# Patient Record
Sex: MALE | Race: WHITE | NOT HISPANIC OR LATINO | Employment: OTHER | URBAN - METROPOLITAN AREA
[De-identification: names, ages, dates, MRNs, and addresses within clinical notes are randomized per-mention and may not be internally consistent; named-entity substitution may affect disease eponyms.]

---

## 2023-02-16 PROBLEM — M40.12 OTHER SECONDARY KYPHOSIS, CERVICAL REGION: Status: ACTIVE | Noted: 2023-02-16

## 2023-02-16 PROBLEM — R34 OLIGURIA: Status: ACTIVE | Noted: 2023-02-16

## 2023-02-16 PROBLEM — H91.90 HARD OF HEARING: Status: ACTIVE | Noted: 2023-02-16

## 2023-02-16 PROBLEM — J44.9 CHRONIC OBSTRUCTIVE PULMONARY DISEASE (MULTI): Status: ACTIVE | Noted: 2023-02-16

## 2023-02-16 PROBLEM — E78.5 DYSLIPIDEMIA: Status: ACTIVE | Noted: 2023-02-16

## 2023-02-16 PROBLEM — E55.9 MILD VITAMIN D DEFICIENCY: Status: ACTIVE | Noted: 2023-02-16

## 2023-02-16 PROBLEM — I27.20 PULMONARY HYPERTENSION (MULTI): Status: ACTIVE | Noted: 2023-02-16

## 2023-02-16 PROBLEM — I07.1 TRICUSPID REGURGITATION: Status: ACTIVE | Noted: 2023-02-16

## 2023-02-16 PROBLEM — I10 HIGH BLOOD PRESSURE: Status: ACTIVE | Noted: 2023-02-16

## 2023-02-16 PROBLEM — R01.1 HEART MURMUR: Status: ACTIVE | Noted: 2023-02-16

## 2023-02-16 PROBLEM — D50.9 IRON DEFICIENCY ANEMIA: Status: ACTIVE | Noted: 2023-02-16

## 2023-02-16 PROBLEM — D64.9 ANEMIA: Status: ACTIVE | Noted: 2023-02-16

## 2023-02-16 PROBLEM — J31.0 NASAL INFLAMMATION: Status: ACTIVE | Noted: 2023-02-16

## 2023-02-16 PROBLEM — C61 PROSTATE CANCER (MULTI): Status: ACTIVE | Noted: 2023-02-16

## 2023-02-16 PROBLEM — N40.0 BPH (BENIGN PROSTATIC HYPERPLASIA): Status: ACTIVE | Noted: 2023-02-16

## 2023-02-16 PROBLEM — I71.21 ASCENDING AORTIC ANEURYSM (CMS-HCC): Status: ACTIVE | Noted: 2023-02-16

## 2023-02-16 PROBLEM — J98.4 RESTRICTIVE LUNG DISEASE: Status: ACTIVE | Noted: 2023-02-16

## 2023-02-16 PROBLEM — R06.09 DYSPNEA ON EXERTION: Status: ACTIVE | Noted: 2023-02-16

## 2023-02-16 PROBLEM — I35.0 AORTIC STENOSIS: Status: ACTIVE | Noted: 2023-02-16

## 2023-02-16 PROBLEM — M81.0 OSTEOPOROSIS: Status: ACTIVE | Noted: 2023-02-16

## 2023-02-16 PROBLEM — I49.9 IRREGULAR HEARTBEAT: Status: ACTIVE | Noted: 2023-02-16

## 2023-02-16 PROBLEM — N18.32 STAGE 3B CHRONIC KIDNEY DISEASE (MULTI): Status: ACTIVE | Noted: 2023-02-16

## 2023-02-16 PROBLEM — E53.8 VITAMIN B12 DEFICIENCY: Status: ACTIVE | Noted: 2023-02-16

## 2023-02-16 PROBLEM — E83.51 LOW CALCIUM LEVELS: Status: ACTIVE | Noted: 2023-02-16

## 2023-02-16 PROBLEM — I34.0 MITRAL REGURGITATION: Status: ACTIVE | Noted: 2023-02-16

## 2023-02-16 PROBLEM — K21.9 GERD (GASTROESOPHAGEAL REFLUX DISEASE): Status: ACTIVE | Noted: 2023-02-16

## 2023-02-16 RX ORDER — FAMOTIDINE 20 MG/1
20 TABLET, FILM COATED ORAL DAILY
COMMUNITY
Start: 2021-03-22 | End: 2023-11-20

## 2023-02-16 RX ORDER — IBUPROFEN 200 MG
1 CAPSULE ORAL DAILY
Status: ON HOLD | COMMUNITY
Start: 2021-10-12 | End: 2023-10-03 | Stop reason: ENTERED-IN-ERROR

## 2023-02-16 RX ORDER — SIMVASTATIN 40 MG/1
40 TABLET, FILM COATED ORAL NIGHTLY
COMMUNITY
Start: 2021-03-22 | End: 2024-02-12

## 2023-02-16 RX ORDER — ACETAMINOPHEN 500 MG
5000 TABLET ORAL DAILY
COMMUNITY
Start: 2021-10-12 | End: 2024-02-12

## 2023-02-16 RX ORDER — LOSARTAN POTASSIUM 50 MG/1
50 TABLET ORAL DAILY
COMMUNITY
Start: 2021-03-22 | End: 2024-01-05

## 2023-02-16 RX ORDER — BUDESONIDE AND FORMOTEROL FUMARATE DIHYDRATE 160; 4.5 UG/1; UG/1
2 AEROSOL RESPIRATORY (INHALATION) 2 TIMES DAILY
COMMUNITY
Start: 2021-03-22 | End: 2023-03-29 | Stop reason: CLARIF

## 2023-02-16 RX ORDER — ASPIRIN 81 MG/1
1 TABLET ORAL NIGHTLY
COMMUNITY
Start: 2021-03-22 | End: 2024-02-12

## 2023-02-16 RX ORDER — FLUTICASONE PROPIONATE 50 MCG
2 SPRAY, SUSPENSION (ML) NASAL DAILY
Status: ON HOLD | COMMUNITY
End: 2023-10-03 | Stop reason: ENTERED-IN-ERROR

## 2023-02-16 RX ORDER — TIOTROPIUM BROMIDE INHALATION SPRAY 3.12 UG/1
2 SPRAY, METERED RESPIRATORY (INHALATION) DAILY
COMMUNITY
Start: 2021-02-02 | End: 2023-11-20

## 2023-02-16 RX ORDER — TORSEMIDE 10 MG/1
10 TABLET ORAL DAILY
COMMUNITY
Start: 2021-03-22 | End: 2023-11-20

## 2023-02-16 RX ORDER — ALBUTEROL SULFATE 90 UG/1
2 AEROSOL, METERED RESPIRATORY (INHALATION) EVERY 4 HOURS PRN
COMMUNITY
Start: 2022-03-22 | End: 2024-01-15 | Stop reason: SDUPTHER

## 2023-02-16 RX ORDER — UBIDECARENONE 75 MG
500 CAPSULE ORAL DAILY
Status: ON HOLD | COMMUNITY
End: 2023-10-03 | Stop reason: ENTERED-IN-ERROR

## 2023-02-16 RX ORDER — AMLODIPINE BESYLATE 10 MG/1
10 TABLET ORAL DAILY
COMMUNITY
Start: 2021-03-22 | End: 2024-02-10 | Stop reason: HOSPADM

## 2023-02-16 RX ORDER — VIT A/VIT C/VIT E/ZINC/COPPER 2148-113
1 TABLET ORAL DAILY
Status: ON HOLD | COMMUNITY
Start: 2021-03-22 | End: 2023-10-03 | Stop reason: ENTERED-IN-ERROR

## 2023-02-16 RX ORDER — POTASSIUM CHLORIDE 750 MG/1
10 TABLET, FILM COATED, EXTENDED RELEASE ORAL DAILY
COMMUNITY
Start: 2022-01-03 | End: 2023-11-20

## 2023-02-16 RX ORDER — FERROUS SULFATE 325(65) MG
65 TABLET, DELAYED RELEASE (ENTERIC COATED) ORAL
COMMUNITY
Start: 2021-02-10 | End: 2023-10-06

## 2023-02-16 RX ORDER — TAMSULOSIN HYDROCHLORIDE 0.4 MG/1
0.4 CAPSULE ORAL DAILY
COMMUNITY
Start: 2021-03-22 | End: 2024-02-12

## 2023-03-23 ENCOUNTER — TELEPHONE (OUTPATIENT)
Dept: PRIMARY CARE | Facility: CLINIC | Age: 88
End: 2023-03-23
Payer: MEDICARE

## 2023-03-27 ASSESSMENT — ENCOUNTER SYMPTOMS
SORE THROAT: 0
CLAUDICATION: 0
SPUTUM PRODUCTION: 0
PND: 0
VOMITING: 0
SHORTNESS OF BREATH: 1
LEG PAIN: 0
HEMOPTYSIS: 0
SYNCOPE: 0
ORTHOPNEA: 0
SWOLLEN GLANDS: 0
HEADACHES: 0
RHINORRHEA: 0
FEVER: 0
ABDOMINAL PAIN: 0
WHEEZING: 0
NECK PAIN: 0

## 2023-03-29 ENCOUNTER — TELEPHONE (OUTPATIENT)
Dept: PRIMARY CARE | Facility: CLINIC | Age: 88
End: 2023-03-29

## 2023-03-29 ENCOUNTER — OFFICE VISIT (OUTPATIENT)
Dept: PRIMARY CARE | Facility: CLINIC | Age: 88
End: 2023-03-29
Payer: MEDICARE

## 2023-03-29 VITALS
RESPIRATION RATE: 18 BRPM | HEART RATE: 68 BPM | SYSTOLIC BLOOD PRESSURE: 130 MMHG | DIASTOLIC BLOOD PRESSURE: 74 MMHG | BODY MASS INDEX: 12.7 KG/M2 | OXYGEN SATURATION: 95 % | WEIGHT: 163 LBS

## 2023-03-29 DIAGNOSIS — I35.0 AORTIC VALVE STENOSIS, ETIOLOGY OF CARDIAC VALVE DISEASE UNSPECIFIED: ICD-10-CM

## 2023-03-29 DIAGNOSIS — C61 PROSTATE CANCER (MULTI): ICD-10-CM

## 2023-03-29 DIAGNOSIS — J44.9 CHRONIC OBSTRUCTIVE PULMONARY DISEASE, UNSPECIFIED COPD TYPE (MULTI): Primary | ICD-10-CM

## 2023-03-29 DIAGNOSIS — M81.0 OSTEOPOROSIS, UNSPECIFIED OSTEOPOROSIS TYPE, UNSPECIFIED PATHOLOGICAL FRACTURE PRESENCE: ICD-10-CM

## 2023-03-29 DIAGNOSIS — I71.21 ANEURYSM OF ASCENDING AORTA WITHOUT RUPTURE (CMS-HCC): ICD-10-CM

## 2023-03-29 PROCEDURE — 3078F DIAST BP <80 MM HG: CPT | Performed by: INTERNAL MEDICINE

## 2023-03-29 PROCEDURE — 3075F SYST BP GE 130 - 139MM HG: CPT | Performed by: INTERNAL MEDICINE

## 2023-03-29 PROCEDURE — 1159F MED LIST DOCD IN RCRD: CPT | Performed by: INTERNAL MEDICINE

## 2023-03-29 PROCEDURE — 99214 OFFICE O/P EST MOD 30 MIN: CPT | Performed by: INTERNAL MEDICINE

## 2023-03-29 RX ORDER — FLUTICASONE FUROATE AND VILANTEROL 200; 25 UG/1; UG/1
1 POWDER RESPIRATORY (INHALATION) DAILY
Qty: 90 EACH | Refills: 3 | Status: SHIPPED | OUTPATIENT
Start: 2023-03-29 | End: 2023-05-30

## 2023-03-29 ASSESSMENT — PATIENT HEALTH QUESTIONNAIRE - PHQ9
SUM OF ALL RESPONSES TO PHQ9 QUESTIONS 1 AND 2: 0
1. LITTLE INTEREST OR PLEASURE IN DOING THINGS: NOT AT ALL
1. LITTLE INTEREST OR PLEASURE IN DOING THINGS: NOT AT ALL
2. FEELING DOWN, DEPRESSED OR HOPELESS: NOT AT ALL
SUM OF ALL RESPONSES TO PHQ9 QUESTIONS 1 AND 2: 0
2. FEELING DOWN, DEPRESSED OR HOPELESS: NOT AT ALL

## 2023-03-29 ASSESSMENT — ENCOUNTER SYMPTOMS
PND: 0
SYNCOPE: 0
NECK PAIN: 0
SWOLLEN GLANDS: 0
VOMITING: 0
SPUTUM PRODUCTION: 0
WHEEZING: 0
ORTHOPNEA: 0
FEVER: 0
HEMOPTYSIS: 0
CLAUDICATION: 0
LEG PAIN: 0
SORE THROAT: 0
ABDOMINAL PAIN: 0
SHORTNESS OF BREATH: 1
RHINORRHEA: 0
HEADACHES: 0

## 2023-03-29 NOTE — TELEPHONE ENCOUNTER
[facility] advised Symbicort -4.5mcg is NOT covered.  Covered alternatives are Breo, Fluticasone/Salmeterol (Airduo), or Dulera.      Please send in a script for one of the alternatives

## 2023-03-29 NOTE — PROGRESS NOTES
Subjective   José Antonio Sandra is a 89 y.o. male who presents for FOLLOW UP  STILL USING O2 AT HOME 2L AT NIGHT WITH SLEEP OR PRN WITH ACTIVITIES   HAVING  L [facility]YE CATARACT SURGERY TOMORROW FOLLOWED BY R IN 2WKS    INSURANCE WILL NOT COVER SYMBICORT PT DOES HAVE PULMONOLOGIST WILL SEE IN MAY BUT YOU WRITE RX     Shortness of Breath  This is a chronic problem. The current episode started more than 1 year ago. Pertinent negatives include no abdominal pain, chest pain, claudication, coryza, ear pain, fever, headaches, hemoptysis, leg pain, leg swelling, neck pain, orthopnea, PND, rash, rhinorrhea, sore throat, sputum production, swollen glands, syncope, vomiting or wheezing. The symptoms are aggravated by exercise and any activity.    SAW PULMONOLOGIST LAST TIME AND SAID CONTINUE    BEEN WORKING FOR 4-5 YEARS.  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  BREATHING HAS BEEN STABLE.  CONTINUES TO RECIEVE PROLIA FOR HIS OSTEOPOROSIS AND LUPRON FOR PROSTATE CANCER      Review of Systems   Constitutional:  Negative for fever.   HENT:  Negative for ear pain, rhinorrhea and sore throat.    Respiratory:  Positive for shortness of breath. Negative for hemoptysis, sputum production and wheezing.    Cardiovascular:  Negative for chest pain, orthopnea, claudication, leg swelling, syncope and PND.   Gastrointestinal:  Negative for abdominal pain and vomiting.   Musculoskeletal:  Negative for neck pain.   Skin:  Negative for rash.   Neurological:  Negative for headaches.       Objective   /74   Pulse 68   Resp 18   Wt 73.9 kg (163 lb)   SpO2 95%   BMI 12.70 kg/m²     Physical Exam  Vitals reviewed.   Constitutional:       General: He is not in acute distress.     Appearance: Normal appearance. He is not ill-appearing.    Neck:      Comments: KYPHOSCOLIOTIC NECK DEFORMITY APPEARS ABOUT THE SAME AS PRIOR  Cardiovascular:      Rate and Rhythm: Normal rate and regular rhythm.      Pulses: Normal pulses.      Heart sounds: Murmur (3/6 RASHARD RUSB) heard.      No gallop.   Pulmonary:      Effort: No respiratory distress.      Breath sounds: Normal breath sounds. No wheezing, rhonchi or rales.   Abdominal:      General: Abdomen is flat. Bowel sounds are normal.      Palpations: Abdomen is soft.      Tenderness: There is no guarding or rebound.   Musculoskeletal:      Right lower leg: No edema.      Left lower leg: No edema.   Skin:     General: Skin is warm and dry.   Neurological:      General: No focal deficit present.      Mental Status: He is alert and oriented to person, place, and time. Mental status is at baseline.         Assessment/Plan   Problem List Items Addressed This Visit          Respiratory    Chronic obstructive pulmonary disease (CMS/HCC) - Primary    Relevant Medications    fluticasone furoate-vilanteroL (Breo Ellipta) 200-25 mcg/dose inhaler       Circulatory    Aortic stenosis    Relevant Orders    Referral to Cardiology    Transthoracic Echo (TTE) Complete    Follow Up In [facility] - PCP    Ascending aortic aneurysm       Genitourinary    Prostate cancer (CMS/HCC)       Musculoskeletal    Osteoporosis     Patient Instructions    BREO ELLIPTA INHALER IS ORDERED TO REPLACE THE SYMBICORT, AND SENT TO [facility]    2.  ECHOCARDIOGRAM IS ORDERED TO FOLLOW UP THE AORTIC STENOSIS SEVERITY    3.  REFER TO DR. OSEI TO GET ANOTHER OPINION AS TO WHETHER HE MIGHT BE A CANDIDATE FOR TAVR, SHOULD IT BE NEEDED    4.  PLEASE CALL IF REFILLS ARE NEEDED    5.  FOLLOW UP IN 6 MONTHS OR AS NEEDED

## 2023-04-25 LAB
ALANINE AMINOTRANSFERASE (SGPT) (U/L) IN SER/PLAS: 14 U/L (ref 10–52)
ALBUMIN (G/DL) IN SER/PLAS: 4.4 G/DL (ref 3.4–5)
ALKALINE PHOSPHATASE (U/L) IN SER/PLAS: 46 U/L (ref 33–136)
ANION GAP IN SER/PLAS: 15 MMOL/L (ref 10–20)
ASPARTATE AMINOTRANSFERASE (SGOT) (U/L) IN SER/PLAS: 14 U/L (ref 9–39)
BILIRUBIN TOTAL (MG/DL) IN SER/PLAS: 0.8 MG/DL (ref 0–1.2)
CALCIDIOL (25 OH VITAMIN D3) (NG/ML) IN SER/PLAS: 76 NG/ML
CALCIUM (MG/DL) IN SER/PLAS: 9.7 MG/DL (ref 8.6–10.3)
CARBON DIOXIDE, TOTAL (MMOL/L) IN SER/PLAS: 27 MMOL/L (ref 21–32)
CHLORIDE (MMOL/L) IN SER/PLAS: 103 MMOL/L (ref 98–107)
CREATININE (MG/DL) IN SER/PLAS: 1.64 MG/DL (ref 0.5–1.3)
GFR MALE: 39 ML/MIN/1.73M2
GLUCOSE (MG/DL) IN SER/PLAS: 87 MG/DL (ref 74–99)
PHOSPHATE (MG/DL) IN SER/PLAS: 3 MG/DL (ref 2.5–4.9)
POTASSIUM (MMOL/L) IN SER/PLAS: 3.8 MMOL/L (ref 3.5–5.3)
PROTEIN TOTAL: 7.2 G/DL (ref 6.4–8.2)
SODIUM (MMOL/L) IN SER/PLAS: 141 MMOL/L (ref 136–145)
UREA NITROGEN (MG/DL) IN SER/PLAS: 29 MG/DL (ref 6–23)

## 2023-05-30 DIAGNOSIS — J44.9 CHRONIC OBSTRUCTIVE PULMONARY DISEASE, UNSPECIFIED COPD TYPE (MULTI): ICD-10-CM

## 2023-05-30 RX ORDER — FLUTICASONE FUROATE AND VILANTEROL TRIFENATATE 200; 25 UG/1; UG/1
POWDER RESPIRATORY (INHALATION)
Qty: 180 EACH | Refills: 3 | Status: SHIPPED | OUTPATIENT
Start: 2023-05-30 | End: 2024-02-16

## 2023-08-19 PROBLEM — H25.13 NUCLEAR SENILE CATARACT OF BOTH EYES: Status: ACTIVE | Noted: 2023-03-17

## 2023-08-19 PROBLEM — I10 BENIGN ESSENTIAL HTN: Chronic | Status: ACTIVE | Noted: 2019-11-12

## 2023-08-19 PROBLEM — R35.1 NOCTURIA: Status: ACTIVE | Noted: 2020-02-13

## 2023-08-19 PROBLEM — D50.0 IRON DEFICIENCY ANEMIA DUE TO CHRONIC BLOOD LOSS: Chronic | Status: ACTIVE | Noted: 2020-03-05

## 2023-08-19 PROBLEM — I50.32 CHRONIC DIASTOLIC HEART FAILURE (MULTI): Chronic | Status: ACTIVE | Noted: 2019-07-29

## 2023-08-19 PROBLEM — E78.2 MIXED HYPERLIPIDEMIA: Chronic | Status: ACTIVE | Noted: 2019-11-12

## 2023-08-19 PROBLEM — J96.11 CHRONIC RESPIRATORY FAILURE WITH HYPOXIA (MULTI): Status: ACTIVE | Noted: 2019-12-04

## 2023-08-19 PROBLEM — M85.80 OSTEOPENIA: Status: ACTIVE | Noted: 2020-08-03

## 2023-08-19 PROBLEM — I34.0 MITRAL VALVE INSUFFICIENCY: Status: ACTIVE | Noted: 2023-03-17

## 2023-08-19 PROBLEM — Z85.46 HISTORY OF MALIGNANT NEOPLASM OF PROSTATE: Status: ACTIVE | Noted: 2022-06-05

## 2023-08-19 PROBLEM — K21.00 GASTROESOPHAGEAL REFLUX DISEASE WITH ESOPHAGITIS: Status: ACTIVE | Noted: 2020-06-02

## 2023-08-19 RX ORDER — POTASSIUM CHLORIDE 14.9 MG/ML
10 INJECTION INTRAVENOUS
Status: ON HOLD | COMMUNITY
End: 2023-10-03 | Stop reason: ENTERED-IN-ERROR

## 2023-08-19 RX ORDER — LEUPROLIDE ACETATE 45 MG
45 KIT INTRAMUSCULAR
Status: ON HOLD | COMMUNITY
End: 2023-10-03 | Stop reason: ENTERED-IN-ERROR

## 2023-08-19 RX ORDER — METOPROLOL SUCCINATE 25 MG/1
TABLET, EXTENDED RELEASE ORAL
Status: ON HOLD | COMMUNITY
Start: 2013-01-07 | End: 2023-10-03 | Stop reason: ENTERED-IN-ERROR

## 2023-08-19 RX ORDER — SODIUM CHLORIDE AND POTASSIUM CHLORIDE 150; 450 MG/100ML; MG/100ML
INJECTION, SOLUTION INTRAVENOUS
Status: ON HOLD | COMMUNITY
End: 2023-10-03 | Stop reason: ENTERED-IN-ERROR

## 2023-08-19 RX ORDER — LOSARTAN POTASSIUM 50 MG/1
1 TABLET ORAL 2 TIMES DAILY
Status: ON HOLD | COMMUNITY
Start: 2020-11-09 | End: 2023-10-03 | Stop reason: ENTERED-IN-ERROR

## 2023-08-19 RX ORDER — FLUTICASONE PROPIONATE AND SALMETEROL 100; 50 UG/1; UG/1
1 POWDER RESPIRATORY (INHALATION)
Status: ON HOLD | COMMUNITY
Start: 2013-01-07 | End: 2023-10-03 | Stop reason: ENTERED-IN-ERROR

## 2023-08-19 RX ORDER — POTASSIUM CHLORIDE 750 MG/1
1 TABLET, FILM COATED, EXTENDED RELEASE ORAL 2 TIMES DAILY
Status: ON HOLD | COMMUNITY
Start: 2021-03-01 | End: 2023-10-03 | Stop reason: ENTERED-IN-ERROR

## 2023-08-19 RX ORDER — ALENDRONATE SODIUM 70 MG/1
TABLET ORAL
Status: ON HOLD | COMMUNITY
Start: 2021-06-24 | End: 2023-10-03 | Stop reason: ENTERED-IN-ERROR

## 2023-08-19 RX ORDER — FUROSEMIDE 40 MG/1
TABLET ORAL DAILY
Status: ON HOLD | COMMUNITY
Start: 2021-03-01 | End: 2023-10-03 | Stop reason: ENTERED-IN-ERROR

## 2023-08-19 RX ORDER — CALCIUM CARB/VITAMIN D3/VIT K1 500-500-40
1 TABLET,CHEWABLE ORAL
Status: ON HOLD | COMMUNITY
End: 2023-10-03 | Stop reason: ENTERED-IN-ERROR

## 2023-08-19 RX ORDER — DENOSUMAB 60 MG/ML
60 INJECTION SUBCUTANEOUS
COMMUNITY
Start: 2023-03-22 | End: 2024-02-12 | Stop reason: ENTERED-IN-ERROR

## 2023-08-19 RX ORDER — BUDESONIDE AND FORMOTEROL FUMARATE DIHYDRATE 160; 4.5 UG/1; UG/1
2 AEROSOL RESPIRATORY (INHALATION) 2 TIMES DAILY
Status: ON HOLD | COMMUNITY
Start: 2021-03-22 | End: 2023-10-03 | Stop reason: ENTERED-IN-ERROR

## 2023-08-19 RX ORDER — ALBUTEROL SULFATE 90 UG/1
2 AEROSOL, METERED RESPIRATORY (INHALATION) EVERY 6 HOURS PRN
Status: ON HOLD | COMMUNITY
End: 2023-10-03 | Stop reason: ENTERED-IN-ERROR

## 2023-08-19 RX ORDER — HYDROCHLOROTHIAZIDE 12.5 MG/1
12.5 CAPSULE ORAL DAILY
Status: ON HOLD | COMMUNITY
Start: 2013-01-07 | End: 2023-10-03 | Stop reason: ENTERED-IN-ERROR

## 2023-08-19 RX ORDER — CIPROFLOXACIN HYDROCHLORIDE 3 MG/ML
1 SOLUTION/ DROPS OPHTHALMIC 4 TIMES DAILY
Status: ON HOLD | COMMUNITY
End: 2023-10-03 | Stop reason: ENTERED-IN-ERROR

## 2023-08-19 RX ORDER — CALCIUM CARBONATE 500(1250)
500 TABLET ORAL
COMMUNITY
End: 2024-02-12

## 2023-08-19 RX ORDER — ALBUTEROL SULFATE 0.83 MG/ML
2.5 SOLUTION RESPIRATORY (INHALATION) EVERY 6 HOURS PRN
Status: ON HOLD | COMMUNITY
Start: 2020-12-15 | End: 2023-10-03 | Stop reason: ENTERED-IN-ERROR

## 2023-08-19 RX ORDER — PREDNISOLONE ACETATE 10 MG/ML
1 SUSPENSION/ DROPS OPHTHALMIC 4 TIMES DAILY
Status: ON HOLD | COMMUNITY
End: 2023-10-03 | Stop reason: ENTERED-IN-ERROR

## 2023-10-02 ENCOUNTER — APPOINTMENT (OUTPATIENT)
Dept: RADIOLOGY | Facility: HOSPITAL | Age: 88
DRG: 280 | End: 2023-10-02
Payer: MEDICARE

## 2023-10-02 ENCOUNTER — HOSPITAL ENCOUNTER (INPATIENT)
Facility: HOSPITAL | Age: 88
LOS: 1 days | Discharge: HOME | DRG: 280 | End: 2023-10-05
Attending: EMERGENCY MEDICINE | Admitting: INTERNAL MEDICINE
Payer: MEDICARE

## 2023-10-02 DIAGNOSIS — I48.20 CHRONIC A-FIB (MULTI): Primary | ICD-10-CM

## 2023-10-02 DIAGNOSIS — I50.33 ACUTE ON CHRONIC DIASTOLIC HEART FAILURE (MULTI): ICD-10-CM

## 2023-10-02 DIAGNOSIS — I48.0 PAROXYSMAL ATRIAL FIBRILLATION (MULTI): ICD-10-CM

## 2023-10-02 DIAGNOSIS — I48.91 ATRIAL FIBRILLATION, UNSPECIFIED TYPE (MULTI): ICD-10-CM

## 2023-10-02 DIAGNOSIS — I50.32 CHRONIC DIASTOLIC HEART FAILURE (MULTI): Chronic | ICD-10-CM

## 2023-10-02 PROBLEM — R79.89 TROPONIN LEVEL ELEVATED: Status: ACTIVE | Noted: 2023-10-02

## 2023-10-02 LAB
ALBUMIN SERPL BCP-MCNC: 4 G/DL (ref 3.4–5)
ALP SERPL-CCNC: 40 U/L (ref 33–136)
ALT SERPL W P-5'-P-CCNC: 13 U/L (ref 10–52)
ANION GAP SERPL CALC-SCNC: 12 MMOL/L (ref 10–20)
AST SERPL W P-5'-P-CCNC: 13 U/L (ref 9–39)
BASOPHILS # BLD AUTO: 0.03 X10*3/UL (ref 0–0.1)
BASOPHILS NFR BLD AUTO: 0.4 %
BILIRUB SERPL-MCNC: 0.6 MG/DL (ref 0–1.2)
BNP SERPL-MCNC: 347 PG/ML (ref 0–99)
BUN SERPL-MCNC: 33 MG/DL (ref 6–23)
CALCIUM SERPL-MCNC: 9 MG/DL (ref 8.6–10.3)
CARDIAC TROPONIN I PNL SERPL HS: 21 NG/L (ref 0–20)
CARDIAC TROPONIN I PNL SERPL HS: 23 NG/L (ref 0–20)
CHLORIDE SERPL-SCNC: 106 MMOL/L (ref 98–107)
CO2 SERPL-SCNC: 27 MMOL/L (ref 21–32)
CREAT SERPL-MCNC: 1.56 MG/DL (ref 0.5–1.3)
EOSINOPHIL # BLD AUTO: 0.18 X10*3/UL (ref 0–0.4)
EOSINOPHIL NFR BLD AUTO: 2.2 %
ERYTHROCYTE [DISTWIDTH] IN BLOOD BY AUTOMATED COUNT: 13.6 % (ref 11.5–14.5)
GFR SERPL CREATININE-BSD FRML MDRD: 41 ML/MIN/1.73M*2
GLUCOSE SERPL-MCNC: 97 MG/DL (ref 74–99)
HCT VFR BLD AUTO: 32.3 % (ref 41–52)
HGB BLD-MCNC: 10 G/DL (ref 13.5–17.5)
IMM GRANULOCYTES # BLD AUTO: 0.02 X10*3/UL (ref 0–0.5)
IMM GRANULOCYTES NFR BLD AUTO: 0.2 % (ref 0–0.9)
LYMPHOCYTES # BLD AUTO: 1.06 X10*3/UL (ref 0.8–3)
LYMPHOCYTES NFR BLD AUTO: 13 %
MAGNESIUM SERPL-MCNC: 2.09 MG/DL (ref 1.6–2.4)
MCH RBC QN AUTO: 28.1 PG (ref 26–34)
MCHC RBC AUTO-ENTMCNC: 31 G/DL (ref 32–36)
MCV RBC AUTO: 91 FL (ref 80–100)
MONOCYTES # BLD AUTO: 0.56 X10*3/UL (ref 0.05–0.8)
MONOCYTES NFR BLD AUTO: 6.9 %
NEUTROPHILS # BLD AUTO: 6.28 X10*3/UL (ref 1.6–5.5)
NEUTROPHILS NFR BLD AUTO: 77.3 %
NRBC BLD-RTO: 0 /100 WBCS (ref 0–0)
PLATELET # BLD AUTO: 116 X10*3/UL (ref 150–450)
PMV BLD AUTO: 11.5 FL (ref 7.5–11.5)
POTASSIUM SERPL-SCNC: 4.3 MMOL/L (ref 3.5–5.3)
PROT SERPL-MCNC: 6.5 G/DL (ref 6.4–8.2)
RBC # BLD AUTO: 3.56 X10*6/UL (ref 4.5–5.9)
SODIUM SERPL-SCNC: 141 MMOL/L (ref 136–145)
WBC # BLD AUTO: 8.1 X10*3/UL (ref 4.4–11.3)

## 2023-10-02 PROCEDURE — 84484 ASSAY OF TROPONIN QUANT: CPT | Performed by: EMERGENCY MEDICINE

## 2023-10-02 PROCEDURE — 93010 ELECTROCARDIOGRAM REPORT: CPT | Performed by: EMERGENCY MEDICINE

## 2023-10-02 PROCEDURE — 83880 ASSAY OF NATRIURETIC PEPTIDE: CPT | Performed by: EMERGENCY MEDICINE

## 2023-10-02 PROCEDURE — 96374 THER/PROPH/DIAG INJ IV PUSH: CPT

## 2023-10-02 PROCEDURE — 99223 1ST HOSP IP/OBS HIGH 75: CPT | Performed by: INTERNAL MEDICINE

## 2023-10-02 PROCEDURE — 85025 COMPLETE CBC W/AUTO DIFF WBC: CPT | Performed by: EMERGENCY MEDICINE

## 2023-10-02 PROCEDURE — 2500000004 HC RX 250 GENERAL PHARMACY W/ HCPCS (ALT 636 FOR OP/ED)

## 2023-10-02 PROCEDURE — 83735 ASSAY OF MAGNESIUM: CPT | Performed by: EMERGENCY MEDICINE

## 2023-10-02 PROCEDURE — 36415 COLL VENOUS BLD VENIPUNCTURE: CPT | Performed by: EMERGENCY MEDICINE

## 2023-10-02 PROCEDURE — 71045 X-RAY EXAM CHEST 1 VIEW: CPT | Performed by: RADIOLOGY

## 2023-10-02 PROCEDURE — 99285 EMERGENCY DEPT VISIT HI MDM: CPT | Performed by: EMERGENCY MEDICINE

## 2023-10-02 PROCEDURE — 36415 COLL VENOUS BLD VENIPUNCTURE: CPT

## 2023-10-02 PROCEDURE — 2580000001 HC RX 258 IV SOLUTIONS

## 2023-10-02 PROCEDURE — 71045 X-RAY EXAM CHEST 1 VIEW: CPT | Mod: FY

## 2023-10-02 PROCEDURE — 96361 HYDRATE IV INFUSION ADD-ON: CPT

## 2023-10-02 PROCEDURE — 80053 COMPREHEN METABOLIC PANEL: CPT | Performed by: EMERGENCY MEDICINE

## 2023-10-02 RX ORDER — FUROSEMIDE 10 MG/ML
40 INJECTION INTRAMUSCULAR; INTRAVENOUS ONCE
Status: COMPLETED | OUTPATIENT
Start: 2023-10-02 | End: 2023-10-02

## 2023-10-02 RX ADMIN — FUROSEMIDE 40 MG: 10 INJECTION, SOLUTION INTRAMUSCULAR; INTRAVENOUS at 22:03

## 2023-10-02 RX ADMIN — SODIUM CHLORIDE, SODIUM LACTATE, POTASSIUM CHLORIDE, AND CALCIUM CHLORIDE 500 ML: 600; 310; 30; 20 INJECTION, SOLUTION INTRAVENOUS at 19:50

## 2023-10-02 ASSESSMENT — PAIN SCALES - GENERAL
PAINLEVEL_OUTOF10: 0 - NO PAIN
PAINLEVEL_OUTOF10: 0 - NO PAIN

## 2023-10-02 ASSESSMENT — LIFESTYLE VARIABLES
HAVE PEOPLE ANNOYED YOU BY CRITICIZING YOUR DRINKING: NO
EVER HAD A DRINK FIRST THING IN THE MORNING TO STEADY YOUR NERVES TO GET RID OF A HANGOVER: NO
HAVE YOU EVER FELT YOU SHOULD CUT DOWN ON YOUR DRINKING: NO
EVER FELT BAD OR GUILTY ABOUT YOUR DRINKING: NO

## 2023-10-02 ASSESSMENT — COLUMBIA-SUICIDE SEVERITY RATING SCALE - C-SSRS
1. IN THE PAST MONTH, HAVE YOU WISHED YOU WERE DEAD OR WISHED YOU COULD GO TO SLEEP AND NOT WAKE UP?: NO
6. HAVE YOU EVER DONE ANYTHING, STARTED TO DO ANYTHING, OR PREPARED TO DO ANYTHING TO END YOUR LIFE?: NO
2. HAVE YOU ACTUALLY HAD ANY THOUGHTS OF KILLING YOURSELF?: NO

## 2023-10-02 ASSESSMENT — PAIN - FUNCTIONAL ASSESSMENT: PAIN_FUNCTIONAL_ASSESSMENT: 0-10

## 2023-10-02 NOTE — ED PROVIDER NOTES
HPI   Chief Complaint   Patient presents with    Irregular Heart Beat     Patient spoke with his cardiologist today. Cardiologist is Dr. Mauro        HPI     89-year-old male presents emergency room with chief complaint of irregular heartbeat and dizziness.  Patient indicates that his eye phone watch Signaling him that he was in A-fib he called his cardiologist who is Dr. Nj Mauro told him to come into the emergency room.  Patient denies any chest pain fever, nausea, vomiting is otherwise feeling okay.    PMHx: Hyperlipidemia, hypertension, aortic stenosis, COPD  PSHx: Appendectomy, prostate procedure  FHx: Mother had a myocardial infarction.  SocHX:     EtOH: Social alcohol use.       Tobacco:  Patient denies cigarette use     Other illicits: none  Allergies: Penicillin patient indicates that he has diffuse swelling throughout.      ROS  Constitutional:      Denies: Fever, fatigue     Reports: None  Neuro:      Denies: Headache, numbness, tingling     Reports: None  Psych:      Denies: anorexia, SI, HI     Reports: None  HEENT:      Denies: vision change, congestion, sore throat     Reports: None  Cardiovascular:      Denies: Chest pain, palpitations, orthopnea     Reports: None  Pulmonary:      Denies:  cough, hemoptysis     Reports: Patient endorses he has more shortness of breath with movement than normal.  Gastrointestinal:      Denies: Abdominal pain, nausea, vomiting, constipation, diarrhea, bright red stools, black stools     Reports: None  Genitourinary:      Denies: Flank pain, painful urination, frequent urination, discharge     Reports: None  Musculoskeletal:     Denies: loss of ROM, extremity pain, back pain     Reports: None  Integumentary:     Denies: Rash, itching     Reports: None    PHYSICAL EXAM  Constitutional: Well appearing, no acute distress, oriented to person/place/time  Psych: Age appropriate insight, judgement, no psychomotor agitation  Neuro: GCS 15, spontaneously moves all  extremities  Head: Atraumatic, no nguyen sign, no raccoon eyes  Eyes: Spontaneously open, PERRL, EOMI, no scleral icterus or conjunctival injection  ENT: No gross deformation, mucous membranes moist, trachea midline, no uvular deviation  Neck: Supple, nontender, no ROM restriction, patient has an appreciable JVD that is exacerbated with pressure on the liver hepatojugular reflex.  Respiratory:  respirations nonlabored, equal chest rise, no wheezes rales or rhonchi.  Crackles were appreciated on the right side.  Cardiovascular: Patient is currently in A-fib.  Distal pulses 2+ symmetric, appreciable aortic stenosis murmur was auscultated.  Gastrointestinal: No gross deformation, normal bowel sounds, soft, nontender, nondistended  Musculoskeletal: Normal Appearance, Full ROM of extremities, no tenderness, no edema  Integumentary: Warm, dry, no rashes    MDM/ED Course  89-year-old male presents emergency room with chief complaint of irregular heartbeat and dizziness.  Medical management in the emergency room will consist of orthostatic blood pressure readings 40 mg of Lasix as the patient appears to be fluid overloaded.  Cardiac work-up which includes CBC, CMP, troponin, BNP, chest x-ray.  Patient will most likely be admitted.  Patient's primary care provider John Russell. Pt was mildly hypoxic on room air and leveld out on 2 l      Given patient's new onset atrial fibrillation and heart failure decision was made to admit the patient to the hospital for further evaluation and management.  Patient is currently rate controlled, no indication for cardioversion at this time.  Patient had been sent in by his cardiologist, Dr. Mauro who was updated on the patient's status.    Chronic conditions: See chart review    External records review: inpatient notes, outpatient records  History obtained from: patient/chart review        Study interpretations:  My independent interpretation of EKG: Patient's A-fib indicates he is in  atrial fibrillation has a ventricular rate of 82 bpm with an undecipherable MD interval QRS duration is 88 ms QT/QTc is 406/474, P-R-T axis -10-48 appears to be a left axis deviation.        I reviewed the case with the attending ED physician. The attending ED physician agrees with the plan. Patient and/or patient´s representative was counseled regarding labs, imaging, likely diagnosis, and plan. All questions were answered.  Deep Bagley MD  PGY-1  Emergency Medicine      Please excuse any misspellings or unintended errors related to the Dragon speech recognition software used to dictate this note.                [place] Coma Scale Score: 15                  Patient History   Past Medical History:   Diagnosis Date    Personal history of malignant neoplasm of prostate 03/22/2021    History of malignant neoplasm of prostate     Past Surgical History:   Procedure Laterality Date    OTHER SURGICAL HISTORY  03/22/2021    Appendectomy     Family History   Problem Relation Name Age of Onset    Hypertension Mother      Heart attack Mother       Social History     Tobacco Use    Smoking status: Never    Smokeless tobacco: Never   Substance Use Topics    Alcohol use: Not on file    Drug use: Not on file       Physical Exam   ED Triage Vitals [10/02/23 1917]   Temp Heart Rate Resp BP   36.6 °C (97.9 °F) 83 18 --      SpO2 Temp Source Heart Rate Source Patient Position   94 % Temporal Monitor Sitting      BP Location FiO2 (%)     Right arm --       Physical Exam    ED Course & MDM   Diagnoses as of 10/02/23 2152   Chronic a-fib (CMS/[facility])       Medical Decision Making      Procedure  Procedures     Deep Bagley MD  Resident  10/02/23 6001    The patient was seen by the resident/fellow.  I have personally performed a substantive portion of the encounter.  I have seen and examined the patient; agree with the workup, evaluation, MDM, management and diagnosis.  The care plan has been discussed with the resident; I have reviewed  the resident’s note and agree with the documented findings.       Mehdi Alexandra, DO  10/07/23 8451

## 2023-10-03 PROBLEM — I50.33 ACUTE ON CHRONIC DIASTOLIC HEART FAILURE (MULTI): Status: ACTIVE | Noted: 2023-10-03

## 2023-10-03 LAB
ALBUMIN SERPL BCP-MCNC: 3.6 G/DL (ref 3.4–5)
ALP SERPL-CCNC: 37 U/L (ref 33–136)
ALT SERPL W P-5'-P-CCNC: 10 U/L (ref 10–52)
ANION GAP SERPL CALC-SCNC: 13 MMOL/L (ref 10–20)
AST SERPL W P-5'-P-CCNC: 15 U/L (ref 9–39)
BILIRUB SERPL-MCNC: 0.7 MG/DL (ref 0–1.2)
BUN SERPL-MCNC: 27 MG/DL (ref 6–23)
CALCIUM SERPL-MCNC: 9 MG/DL (ref 8.6–10.3)
CHLORIDE SERPL-SCNC: 105 MMOL/L (ref 98–107)
CO2 SERPL-SCNC: 25 MMOL/L (ref 21–32)
CREAT SERPL-MCNC: 1.56 MG/DL (ref 0.5–1.3)
ERYTHROCYTE [DISTWIDTH] IN BLOOD BY AUTOMATED COUNT: 13.5 % (ref 11.5–14.5)
GFR SERPL CREATININE-BSD FRML MDRD: 41 ML/MIN/1.73M*2
GLUCOSE SERPL-MCNC: 162 MG/DL (ref 74–99)
HCT VFR BLD AUTO: 33.7 % (ref 41–52)
HGB BLD-MCNC: 10.3 G/DL (ref 13.5–17.5)
MAGNESIUM SERPL-MCNC: 2.1 MG/DL (ref 1.6–2.4)
MCH RBC QN AUTO: 28.9 PG (ref 26–34)
MCHC RBC AUTO-ENTMCNC: 30.6 G/DL (ref 32–36)
MCV RBC AUTO: 94 FL (ref 80–100)
NRBC BLD-RTO: 0 /100 WBCS (ref 0–0)
PLATELET # BLD AUTO: 122 X10*3/UL (ref 150–450)
PMV BLD AUTO: 12.3 FL (ref 7.5–11.5)
POTASSIUM SERPL-SCNC: 4 MMOL/L (ref 3.5–5.3)
PROT SERPL-MCNC: 5.9 G/DL (ref 6.4–8.2)
RBC # BLD AUTO: 3.57 X10*6/UL (ref 4.5–5.9)
SODIUM SERPL-SCNC: 139 MMOL/L (ref 136–145)
WBC # BLD AUTO: 8.2 X10*3/UL (ref 4.4–11.3)

## 2023-10-03 PROCEDURE — 36415 COLL VENOUS BLD VENIPUNCTURE: CPT | Performed by: INTERNAL MEDICINE

## 2023-10-03 PROCEDURE — 2500000002 HC RX 250 W HCPCS SELF ADMINISTERED DRUGS (ALT 637 FOR MEDICARE OP, ALT 636 FOR OP/ED): Performed by: STUDENT IN AN ORGANIZED HEALTH CARE EDUCATION/TRAINING PROGRAM

## 2023-10-03 PROCEDURE — 80053 COMPREHEN METABOLIC PANEL: CPT | Performed by: INTERNAL MEDICINE

## 2023-10-03 PROCEDURE — 2500000004 HC RX 250 GENERAL PHARMACY W/ HCPCS (ALT 636 FOR OP/ED): Performed by: STUDENT IN AN ORGANIZED HEALTH CARE EDUCATION/TRAINING PROGRAM

## 2023-10-03 PROCEDURE — 99232 SBSQ HOSP IP/OBS MODERATE 35: CPT | Performed by: NURSE PRACTITIONER

## 2023-10-03 PROCEDURE — 97165 OT EVAL LOW COMPLEX 30 MIN: CPT | Mod: GO | Performed by: OCCUPATIONAL THERAPIST

## 2023-10-03 PROCEDURE — 2500000005 HC RX 250 GENERAL PHARMACY W/O HCPCS: Performed by: STUDENT IN AN ORGANIZED HEALTH CARE EDUCATION/TRAINING PROGRAM

## 2023-10-03 PROCEDURE — 83735 ASSAY OF MAGNESIUM: CPT | Performed by: INTERNAL MEDICINE

## 2023-10-03 PROCEDURE — 94640 AIRWAY INHALATION TREATMENT: CPT

## 2023-10-03 PROCEDURE — 85027 COMPLETE CBC AUTOMATED: CPT | Performed by: INTERNAL MEDICINE

## 2023-10-03 PROCEDURE — G0378 HOSPITAL OBSERVATION PER HR: HCPCS

## 2023-10-03 PROCEDURE — 99254 IP/OBS CNSLTJ NEW/EST MOD 60: CPT | Performed by: INTERNAL MEDICINE

## 2023-10-03 PROCEDURE — 99232 SBSQ HOSP IP/OBS MODERATE 35: CPT | Performed by: STUDENT IN AN ORGANIZED HEALTH CARE EDUCATION/TRAINING PROGRAM

## 2023-10-03 PROCEDURE — 2500000001 HC RX 250 WO HCPCS SELF ADMINISTERED DRUGS (ALT 637 FOR MEDICARE OP): Performed by: STUDENT IN AN ORGANIZED HEALTH CARE EDUCATION/TRAINING PROGRAM

## 2023-10-03 PROCEDURE — 97161 PT EVAL LOW COMPLEX 20 MIN: CPT | Mod: GP

## 2023-10-03 RX ORDER — BUDESONIDE 0.5 MG/2ML
0.5 INHALANT ORAL
Status: DISCONTINUED | OUTPATIENT
Start: 2023-10-03 | End: 2023-10-05 | Stop reason: HOSPADM

## 2023-10-03 RX ORDER — ACETAMINOPHEN 160 MG/5ML
650 SOLUTION ORAL EVERY 4 HOURS PRN
Status: DISCONTINUED | OUTPATIENT
Start: 2023-10-03 | End: 2023-10-05 | Stop reason: HOSPADM

## 2023-10-03 RX ORDER — POLYETHYLENE GLYCOL 3350 17 G/17G
17 POWDER, FOR SOLUTION ORAL DAILY
Status: DISCONTINUED | OUTPATIENT
Start: 2023-10-03 | End: 2023-10-05 | Stop reason: HOSPADM

## 2023-10-03 RX ORDER — ACETAMINOPHEN 325 MG/1
650 TABLET ORAL EVERY 4 HOURS PRN
Status: DISCONTINUED | OUTPATIENT
Start: 2023-10-03 | End: 2023-10-05 | Stop reason: HOSPADM

## 2023-10-03 RX ORDER — ACETAMINOPHEN 650 MG/1
650 SUPPOSITORY RECTAL EVERY 4 HOURS PRN
Status: DISCONTINUED | OUTPATIENT
Start: 2023-10-03 | End: 2023-10-05 | Stop reason: HOSPADM

## 2023-10-03 RX ORDER — AMLODIPINE BESYLATE 10 MG/1
10 TABLET ORAL DAILY
Status: DISCONTINUED | OUTPATIENT
Start: 2023-10-03 | End: 2023-10-05 | Stop reason: HOSPADM

## 2023-10-03 RX ORDER — ACETAMINOPHEN 500 MG
5000 TABLET ORAL DAILY
Status: DISCONTINUED | OUTPATIENT
Start: 2023-10-03 | End: 2023-10-05 | Stop reason: HOSPADM

## 2023-10-03 RX ORDER — ONDANSETRON HYDROCHLORIDE 2 MG/ML
4 INJECTION, SOLUTION INTRAVENOUS EVERY 8 HOURS PRN
Status: DISCONTINUED | OUTPATIENT
Start: 2023-10-03 | End: 2023-10-05 | Stop reason: HOSPADM

## 2023-10-03 RX ORDER — ALBUTEROL SULFATE 90 UG/1
2 AEROSOL, METERED RESPIRATORY (INHALATION) EVERY 4 HOURS PRN
Status: DISCONTINUED | OUTPATIENT
Start: 2023-10-03 | End: 2023-10-03

## 2023-10-03 RX ORDER — TORSEMIDE 20 MG/1
10 TABLET ORAL DAILY
Status: DISCONTINUED | OUTPATIENT
Start: 2023-10-03 | End: 2023-10-05 | Stop reason: HOSPADM

## 2023-10-03 RX ORDER — FORMOTEROL FUMARATE DIHYDRATE 20 UG/2ML
20 SOLUTION RESPIRATORY (INHALATION)
Status: DISCONTINUED | OUTPATIENT
Start: 2023-10-03 | End: 2023-10-05 | Stop reason: HOSPADM

## 2023-10-03 RX ORDER — ASPIRIN 81 MG/1
81 TABLET ORAL DAILY
Status: DISCONTINUED | OUTPATIENT
Start: 2023-10-03 | End: 2023-10-05 | Stop reason: HOSPADM

## 2023-10-03 RX ORDER — ALBUTEROL SULFATE 0.83 MG/ML
2.5 SOLUTION RESPIRATORY (INHALATION) EVERY 4 HOURS PRN
Status: DISCONTINUED | OUTPATIENT
Start: 2023-10-03 | End: 2023-10-05 | Stop reason: HOSPADM

## 2023-10-03 RX ORDER — FAMOTIDINE 20 MG/1
20 TABLET, FILM COATED ORAL DAILY
Status: DISCONTINUED | OUTPATIENT
Start: 2023-10-03 | End: 2023-10-05 | Stop reason: HOSPADM

## 2023-10-03 RX ORDER — SIMVASTATIN 40 MG/1
40 TABLET, FILM COATED ORAL NIGHTLY
Status: DISCONTINUED | OUTPATIENT
Start: 2023-10-03 | End: 2023-10-05 | Stop reason: HOSPADM

## 2023-10-03 RX ORDER — ONDANSETRON 4 MG/1
4 TABLET, ORALLY DISINTEGRATING ORAL EVERY 8 HOURS PRN
Status: DISCONTINUED | OUTPATIENT
Start: 2023-10-03 | End: 2023-10-05 | Stop reason: HOSPADM

## 2023-10-03 RX ORDER — LOSARTAN POTASSIUM 50 MG/1
50 TABLET ORAL DAILY
Status: DISCONTINUED | OUTPATIENT
Start: 2023-10-03 | End: 2023-10-05 | Stop reason: HOSPADM

## 2023-10-03 RX ORDER — TAMSULOSIN HYDROCHLORIDE 0.4 MG/1
0.4 CAPSULE ORAL DAILY
Status: DISCONTINUED | OUTPATIENT
Start: 2023-10-03 | End: 2023-10-05 | Stop reason: HOSPADM

## 2023-10-03 RX ORDER — FLUTICASONE FUROATE AND VILANTEROL 200; 25 UG/1; UG/1
1 POWDER RESPIRATORY (INHALATION) DAILY
Status: DISCONTINUED | OUTPATIENT
Start: 2023-10-03 | End: 2023-10-03

## 2023-10-03 RX ADMIN — FORMOTEROL FUMARATE 20 MCG: 20 SOLUTION RESPIRATORY (INHALATION) at 21:44

## 2023-10-03 RX ADMIN — CHOLECALCIFEROL TAB 125 MCG (5000 UNIT) 5000 UNITS: 125 TAB at 21:21

## 2023-10-03 RX ADMIN — BUDESONIDE 0.5 MG: 0.5 INHALANT RESPIRATORY (INHALATION) at 21:44

## 2023-10-03 RX ADMIN — AMLODIPINE BESYLATE 10 MG: 10 TABLET ORAL at 21:28

## 2023-10-03 RX ADMIN — TORSEMIDE 10 MG: 20 TABLET ORAL at 21:22

## 2023-10-03 RX ADMIN — FAMOTIDINE 20 MG: 20 TABLET ORAL at 21:22

## 2023-10-03 RX ADMIN — SIMVASTATIN 40 MG: 40 TABLET, FILM COATED ORAL at 21:22

## 2023-10-03 RX ADMIN — ASPIRIN 81 MG: 81 TABLET, COATED ORAL at 21:27

## 2023-10-03 RX ADMIN — TAMSULOSIN HYDROCHLORIDE 0.4 MG: 0.4 CAPSULE ORAL at 21:28

## 2023-10-03 RX ADMIN — Medication 3 L/MIN: at 21:48

## 2023-10-03 RX ADMIN — LOSARTAN POTASSIUM 50 MG: 50 TABLET, FILM COATED ORAL at 21:28

## 2023-10-03 SDOH — SOCIAL STABILITY: SOCIAL INSECURITY: DO YOU FEEL UNSAFE GOING BACK TO THE PLACE WHERE YOU ARE LIVING?: NO

## 2023-10-03 SDOH — SOCIAL STABILITY: SOCIAL INSECURITY: DO YOU FEEL ANYONE HAS EXPLOITED OR TAKEN ADVANTAGE OF YOU FINANCIALLY OR OF YOUR PERSONAL PROPERTY?: NO

## 2023-10-03 SDOH — SOCIAL STABILITY: SOCIAL INSECURITY: DOES ANYONE TRY TO KEEP YOU FROM HAVING/CONTACTING OTHER FRIENDS OR DOING THINGS OUTSIDE YOUR HOME?: NO

## 2023-10-03 SDOH — SOCIAL STABILITY: SOCIAL INSECURITY: ARE YOU OR HAVE YOU BEEN THREATENED OR ABUSED PHYSICALLY, EMOTIONALLY, OR SEXUALLY BY ANYONE?: NO

## 2023-10-03 SDOH — SOCIAL STABILITY: SOCIAL INSECURITY: ABUSE: ADULT

## 2023-10-03 SDOH — SOCIAL STABILITY: SOCIAL INSECURITY: ARE THERE ANY APPARENT SIGNS OF INJURIES/BEHAVIORS THAT COULD BE RELATED TO ABUSE/NEGLECT?: NO

## 2023-10-03 SDOH — SOCIAL STABILITY: SOCIAL INSECURITY: HAS ANYONE EVER THREATENED TO HURT YOUR FAMILY OR YOUR PETS?: NO

## 2023-10-03 SDOH — SOCIAL STABILITY: SOCIAL INSECURITY: HAVE YOU HAD THOUGHTS OF HARMING ANYONE ELSE?: NO

## 2023-10-03 ASSESSMENT — LIFESTYLE VARIABLES
AUDIT-C TOTAL SCORE: 0
SKIP TO QUESTIONS 9-10: 1
SUBSTANCE_ABUSE_PAST_12_MONTHS: NO
AUDIT-C TOTAL SCORE: 0
HOW MANY STANDARD DRINKS CONTAINING ALCOHOL DO YOU HAVE ON A TYPICAL DAY: PATIENT DOES NOT DRINK
HOW OFTEN DO YOU HAVE 6 OR MORE DRINKS ON ONE OCCASION: NEVER
PRESCIPTION_ABUSE_PAST_12_MONTHS: NO
HOW OFTEN DO YOU HAVE A DRINK CONTAINING ALCOHOL: NEVER

## 2023-10-03 ASSESSMENT — COGNITIVE AND FUNCTIONAL STATUS - GENERAL
DRESSING REGULAR LOWER BODY CLOTHING: A LITTLE
MOBILITY SCORE: 22
DAILY ACTIVITIY SCORE: 22
MOBILITY SCORE: 21
TURNING FROM BACK TO SIDE WHILE IN FLAT BAD: A LITTLE
WALKING IN HOSPITAL ROOM: A LITTLE
CLIMB 3 TO 5 STEPS WITH RAILING: A LOT
PERSONAL GROOMING: A LITTLE
DRESSING REGULAR LOWER BODY CLOTHING: A LITTLE
WALKING IN HOSPITAL ROOM: A LITTLE
WALKING IN HOSPITAL ROOM: A LITTLE
CLIMB 3 TO 5 STEPS WITH RAILING: A LITTLE
HELP NEEDED FOR BATHING: A LITTLE
EATING MEALS: A LITTLE
WALKING IN HOSPITAL ROOM: A LITTLE
TURNING FROM BACK TO SIDE WHILE IN FLAT BAD: A LITTLE
DAILY ACTIVITIY SCORE: 18
HELP NEEDED FOR BATHING: A LITTLE
DAILY ACTIVITIY SCORE: 24
DAILY ACTIVITIY SCORE: 22
TURNING FROM BACK TO SIDE WHILE IN FLAT BAD: A LITTLE
MOVING FROM LYING ON BACK TO SITTING ON SIDE OF FLAT BED WITH BEDRAILS: A LITTLE
STANDING UP FROM CHAIR USING ARMS: A LITTLE
MOBILITY SCORE: 17
MOBILITY SCORE: 21
TOILETING: A LITTLE
DRESSING REGULAR UPPER BODY CLOTHING: A LITTLE
HELP NEEDED FOR BATHING: A LITTLE
PATIENT BASELINE BEDBOUND: NO
MOVING TO AND FROM BED TO CHAIR: A LITTLE
DRESSING REGULAR LOWER BODY CLOTHING: A LITTLE

## 2023-10-03 ASSESSMENT — PAIN SCALES - GENERAL
PAINLEVEL_OUTOF10: 0 - NO PAIN

## 2023-10-03 ASSESSMENT — PATIENT HEALTH QUESTIONNAIRE - PHQ9
SUM OF ALL RESPONSES TO PHQ9 QUESTIONS 1 & 2: 0
2. FEELING DOWN, DEPRESSED OR HOPELESS: NOT AT ALL
1. LITTLE INTEREST OR PLEASURE IN DOING THINGS: NOT AT ALL

## 2023-10-03 ASSESSMENT — COLUMBIA-SUICIDE SEVERITY RATING SCALE - C-SSRS
1. IN THE PAST MONTH, HAVE YOU WISHED YOU WERE DEAD OR WISHED YOU COULD GO TO SLEEP AND NOT WAKE UP?: NO
2. HAVE YOU ACTUALLY HAD ANY THOUGHTS OF KILLING YOURSELF?: NO
6. HAVE YOU EVER DONE ANYTHING, STARTED TO DO ANYTHING, OR PREPARED TO DO ANYTHING TO END YOUR LIFE?: NO

## 2023-10-03 ASSESSMENT — ACTIVITIES OF DAILY LIVING (ADL)
HEARING - RIGHT EAR: HEARING AID
FEEDING YOURSELF: INDEPENDENT
HEARING - LEFT EAR: HEARING AID
WALKS IN HOME: INDEPENDENT
JUDGMENT_ADEQUATE_SAFELY_COMPLETE_DAILY_ACTIVITIES: YES
GROOMING: INDEPENDENT
BATHING: INDEPENDENT
PATIENT'S MEMORY ADEQUATE TO SAFELY COMPLETE DAILY ACTIVITIES?: YES
DRESSING YOURSELF: INDEPENDENT
TOILETING: INDEPENDENT
ADEQUATE_TO_COMPLETE_ADL: YES
ASSISTIVE_DEVICE: CANE;EYEGLASSES

## 2023-10-03 ASSESSMENT — PAIN - FUNCTIONAL ASSESSMENT
PAIN_FUNCTIONAL_ASSESSMENT: 0-10

## 2023-10-03 ASSESSMENT — ENCOUNTER SYMPTOMS: SHORTNESS OF BREATH: 1

## 2023-10-03 NOTE — PROGRESS NOTES
Occupational Therapy    Occupational Therapy    Evaluation/Treatment    Patient Name: José Antonio Sandra  MRN: [id number]  : 1/10/1931  Today's Date: 10/03/23  Time Calculation  Start Time: 1329  Stop Time: 1346  Time Calculation (min): 17 min       Assessment:  Prognosis: Good  Evaluation/Treatment Tolerance: Patient tolerated treatment well  Medical Staff Made Aware: Yes  OT Assessment Results: Decreased ADL status, Decreased endurance, Decreased functional mobility, Decreased IADLs  Prognosis: Good  Evaluation/Treatment Tolerance: Patient tolerated treatment well  Medical Staff Made Aware: Yes    Plan:  Treatment Interventions: ADL retraining, UE strengthening/ROM, Neuromuscular reeducation, Patient/family training, Endurance training, Functional transfer training  OT Frequency: 3 times per week  OT Discharge Recommendations: Low intensity level of continued care  Treatment Interventions: ADL retraining, UE strengthening/ROM, Neuromuscular reeducation, Patient/family training, Endurance training, Functional transfer training  Subjective     Current Problem:  1. Chronic a-fib (CMS/HCC)        2. Atrial fibrillation, unspecified type (CMS/HCC)        3. Acute on chronic diastolic heart failure (CMS/HCC)  Transthoracic Echo (TTE) Complete    Transthoracic Echo (TTE) Complete      4. Paroxysmal atrial fibrillation (CMS/HCC)  Transthoracic Echo (TTE) Complete    Transthoracic Echo (TTE) Complete          General:   OT Received On: 10/03/23  General  Reason for Referral: irregular heartbeat  Referred By: Dr. Bustamante  Past Medical History Relevant to Rehab: HLD, HTN,aortic stensis, COPD  Family/Caregiver Present: No  Co-Treatment: PT  Prior to Session Communication: Bedside nurse  Patient Position Received: Bed, 3 rail up    Precautions:  Medical Precautions: Fall precautions    Vital Signs:  SpO2: 91 % (2LO2)    Pain:  Pain Assessment  Pain Assessment: 0-10  Pain Score: 0 - No pain  Objective     Cognition:  Overall  Cognitive Status: Within Functional Limits   Home Living:  Type of Home: House  Lives With: Spouse  Home Layout: One level  Home Access: No concerns  Bathroom Shower/Tub: Walk-in shower    Prior Function:  Level of [place]: Independent with ADLs and functional transfers    IADL History:  Homemaking Responsibilities: Yes  Laundry Responsibility: Primary  Shopping Responsibility: Primary  Mode of Transportation: Car    ADL History:  Grooming Assistance: Stand by  Toileting Assistance with Device: Stand by    Activity Tolerance:  Endurance: Endurance does not limit participation in activity    Functional Standing Tolerance:       Bed Mobility/Transfers: Bed Mobility  Bed Mobility: Yes  Bed Mobility 1  Bed Mobility 1: Supine to sitting  Level of Assistance 1: Minimum assistance  Transfers  Transfer: Yes  Transfer 1  Transfer From 1: Sit to  Transfer to 1: Stand  Transfer Level of Assistance 1: Contact guard  Transfers 2  Transfer From 2: Bed to  Transfer to 2: Chair with arms  Technique 2: Stand pivot  Transfer Device 2: Cane  Transfer Level of Assistance 2: Contact guard        Extremities: RUE   RUE : Within Functional Limits and LUE   LUE: Within Functional Limits    Outcome Measures: [facility] Daily Activity  Putting on and taking off regular lower body clothing: A little  Bathing (including washing, rinsing, drying): A little  Putting on and taking off regular upper body clothing: None  Toileting, which includes using toilet, bedpan or urinal: None  Taking care of personal grooming such as brushing teeth: None  Eating Meals: None  Daily Activity - Total Score: 22  Education Documentation  ADL Training, taught by Jean-Claude Ray OT at 10/3/2023  2:40 PM.  Learner: Patient  Readiness: Acceptance  Method: Explanation  Response: Verbalizes Understanding    Education Comments  No comments found.        EDUCATION:  Education  Individual(s) Educated: Patient  Education Provided: Fall precautons, Risk and benefits of OT  discussed with patient or other  Patient/Caregiver Demonstrated Understanding: yes    Goals:  Encounter Problems       Encounter Problems (Active)       OT Goals       Patient will complete functional transfers mod I. (Progressing)       Start:  10/03/23    Expected End:  10/17/23            Patient will complete toileting with  mod I. (Progressing)       Start:  10/03/23    Expected End:  10/17/23

## 2023-10-03 NOTE — PROGRESS NOTES
Physical Therapy    Physical Therapy    Physical Therapy Evaluation    Patient Name: José Antonio Sandra  MRN: [id number]  Today's Date: 10/3/2023   Time Calculation  Start Time: 1357  Stop Time: 1409  Time Calculation (min): 12 min    Assessment/Plan   PT Assessment: Pt demonstrates decreased strength, decreased endurance, decreased strength, and impaired balance/mobility.  Based on current level of function, pt would benefit from continued skilled therapy while in the hospital to ensure safety, decrease risk of falls, and regains strength/mobility back to baseline.  Once stable enough for discharge, pt would benefit from low intensity therapy.   PT Assessment Results: Decreased strength, Decreased endurance, Impaired balance, Decreased mobility, Impaired hearing  Rehab Prognosis: Good  Evaluation/Treatment Tolerance: Patient tolerated treatment well  Medical Staff Made Aware: Yes  End of Session Communication: Bedside nurse  End of Session Patient Position: Up in chair, Alarm on  IP OR SWING BED PT PLAN  Inpatient or Swing Bed: Inpatient  PT Plan  Treatment/Interventions: Bed mobility, Transfer training, Gait training, Balance training, Neuromuscular re-education, Strengthening, Endurance training, Therapeutic exercise, Therapeutic activity  PT Plan: Skilled PT  PT Frequency: 3 times per week  PT Discharge Recommendations: Low intensity level of continued care  Equipment Recommended upon Discharge: Straight cane  PT Recommended Transfer Status: Stand by assist    Subjective     Current Problem:  1. Chronic a-fib (CMS/HCC)        2. Atrial fibrillation, unspecified type (CMS/HCC)        3. Acute on chronic diastolic heart failure (CMS/HCC)  Transthoracic Echo (TTE) Complete    Transthoracic Echo (TTE) Complete      4. Paroxysmal atrial fibrillation (CMS/HCC)  Transthoracic Echo (TTE) Complete    Transthoracic Echo (TTE) Complete        Past Medical History:   Diagnosis Date    Personal history of malignant neoplasm of  "prostate 03/22/2021    History of malignant neoplasm of prostate     Past Surgical History:   Procedure Laterality Date    OTHER SURGICAL HISTORY  03/22/2021    Appendectomy     General Visit Information:  General: On arrival, pt supine in bed with PCA in room.  Pt in no apparent distress and agreeable to therapy.  Reason for Referral: impaired mobility  Referred By: Jose Bustamante  Co-Treatment: OT  Prior to Session Communication: Bedside nurse  Patient Position Received: Bed, 3 rail up, Alarm on    Home Living/PLOF:  Pt lives with wife in house with 0 RAKESH.  Mod I with ADLs and mobility using SPC PTA.  Pt drives and shares IADLs with wife.  Pt denies any recent falls.  Pt wears 2L O2 at night and \"when I am doing things.\"    Precautions:  Precautions  Medical Precautions: Oxygen therapy device and L/min, Fall precautions    Vital Signs:  Vital Signs  SpO2: 91 % (decreased to 89% on RA; improved to 91% when 2LNC replaced)    Objective     Pain:  Pain Assessment  Pain Assessment: 0-10  Pain Score: 0 - No pain    Cognition:  Cognition  Overall Cognitive Status: Within Functional Limits    General Assessments:      Activity Tolerance  Endurance: Tolerates less than 10 min exercise with changes in vital signs (decreased SpO2 with activity)  Static Sitting Balance  Static Sitting-Comment/Number of Minutes: Good  Dynamic Sitting Balance  Dynamic Sitting-Comments: Fair  Static Standing Balance  Static Standing-Comment/Number of Minutes: Fair plus  Dynamic Standing Balance  Dynamic Standing-Comments: Fair    Functional Assessments:  Bed Mobility   Supine to sit: CGA  Transfers  Sit to stand: SBA  Stand to sit: SBA  Toilet tx: pt able to maintain stand balance with SBA while completing toileting  Ambulation/Gait Training  Pt ambulated 20 ft with R SPC and SBA    Extremity/Trunk Assessments:  RLE  ROM: WFL  Strength: WFL    LLE  ROM: WFL  Strength: WFL    Outcome Measures:     [facility] Basic Mobility  Turning from your back to your " side while in a flat bed without using bedrails: None  Moving from lying on your back to sitting on the side of a flat bed without using bedrails: A little  Moving to and from bed to chair (including a wheelchair): None  Standing up from a chair using your arms (e.g. wheelchair or bedside chair): None  To walk in hospital room: A little  Climbing 3-5 steps with railing: A little  Basic Mobility - Total Score: 21  Encounter Problems       Encounter Problems (Active)       PT Problem       Pt will be able to perform all bed mobility tasks with Mod I.  (Progressing)       Start:  10/03/23    Expected End:  10/17/23            Pt will perform all transfers with Mod I and LRAD with proper safety mechanics.   (Progressing)       Start:  10/03/23    Expected End:  10/17/23            Pt will ambulate 50ft with Mod I using LRAD for improved functional independence.  (Progressing)       Start:  10/03/23    Expected End:  10/17/23            Pt will demonstrate at least 4+/5 BLE strength for ease with functional mobility.   (Progressing)       Start:  10/03/23    Expected End:  10/17/23               PT Problem       Pt will be able to ambulate at least 50 ft with < or equal to 1 rest break while maintaining SpO2 > 90%. (Progressing)       Start:  10/03/23                 Education Documentation  Mobility Training, taught by Genoveva Kwon, PT at 10/3/2023  2:56 PM.  Learner: Patient  Readiness: Acceptance  Method: Explanation  Response: Verbalizes Understanding    Education Comments  No comments found.

## 2023-10-03 NOTE — H&P
"History Of Present Illness  José Antonio Sandra is a 89 y.o. male presenting with concern for persistent atrial fibrillation that is described as rate controlled.      Review of outpatient records note a history of COPD, aortic stenosis, previous evaluation for possible TAVR procedure.  No clear documentation of history of atrial fibrillation.  In discussion with patient, he describes a very much paroxysmal scenario in which he has had his Apple Watch notify him before and states he is even sought evaluation in the emergency department but by then, he has converted back to sinus rhythm.  He states that he sometimes does appreciate palpitations but feels as if the sensation only last for several minutes before resolving completely.  He states his greatest concern at the onset of the symptoms was the duration.  He has had persistent symptoms including palpitations for more than 24 hours at the time of my assessment.  States that he is intolerant to beta-blockers because they \"induced an arrhythmia\".  Reports outpatient follow-up with Dr. Mauro.  Admitted for further management.  Past Medical History  He has a past medical history of Personal history of malignant neoplasm of prostate (03/22/2021).    Surgical History  Appendectomy     Social History  He reports that he has never smoked. He has never used smokeless tobacco. No history on file for alcohol use and drug use.    Family History  Family History   Problem Relation Name Age of Onset    Hypertension Mother      Heart attack Mother          Allergies  Penicillins    Review of Systems   Respiratory:  Positive for shortness of breath.    All other systems reviewed and are negative.       Physical Exam  Vitals and nursing note reviewed.   Constitutional:       Appearance: Normal appearance.   HENT:      Head: Normocephalic and atraumatic.   Eyes:      Extraocular Movements: Extraocular movements intact.      Conjunctiva/sclera: Conjunctivae normal.   Cardiovascular: "      Rate and Rhythm: Normal rate. Rhythm irregular.      Pulses: Normal pulses.   Pulmonary:      Effort: Pulmonary effort is normal.      Breath sounds: Normal breath sounds and air entry.   Abdominal:      General: Bowel sounds are normal.      Palpations: Abdomen is soft.   Musculoskeletal:      Cervical back: Normal range of motion and neck supple.   Skin:     General: Skin is warm and dry.   Neurological:      General: No focal deficit present.      Mental Status: He is alert. Mental status is at baseline.   Psychiatric:         Mood and Affect: Mood normal.         Behavior: Behavior normal. Behavior is cooperative.          Last Recorded Vitals  Blood pressure 152/78, pulse 74, temperature 36.8 °C (98.2 °F), temperature source Temporal, resp. rate 18, weight 73 kg (160 lb 15 oz), SpO2 98 %.    Relevant Results        Scheduled medications  polyethylene glycol, 17 g, oral, Daily      Continuous medications     PRN medications  PRN medications: acetaminophen **OR** acetaminophen **OR** acetaminophen, acetaminophen **OR** acetaminophen **OR** acetaminophen, ondansetron ODT **OR** ondansetron  Results for orders placed or performed during the hospital encounter of 10/02/23 (from the past 24 hour(s))   CBC and Auto Differential   Result Value Ref Range    WBC 8.1 4.4 - 11.3 x10*3/uL    nRBC 0.0 0.0 - 0.0 /100 WBCs    RBC 3.56 (L) 4.50 - 5.90 x10*6/uL    Hemoglobin 10.0 (L) 13.5 - 17.5 g/dL    Hematocrit 32.3 (L) 41.0 - 52.0 %    MCV 91 80 - 100 fL    MCH 28.1 26.0 - 34.0 pg    MCHC 31.0 (L) 32.0 - 36.0 g/dL    RDW 13.6 11.5 - 14.5 %    Platelets 116 (L) 150 - 450 x10*3/uL    MPV 11.5 7.5 - 11.5 fL    Neutrophils % 77.3 40.0 - 80.0 %    Immature Granulocytes %, Automated 0.2 0.0 - 0.9 %    Lymphocytes % 13.0 13.0 - 44.0 %    Monocytes % 6.9 2.0 - 10.0 %    Eosinophils % 2.2 0.0 - 6.0 %    Basophils % 0.4 0.0 - 2.0 %    Neutrophils Absolute 6.28 (H) 1.60 - 5.50 x10*3/uL    Immature Granulocytes Absolute, Automated  0.02 0.00 - 0.50 x10*3/uL    Lymphocytes Absolute 1.06 0.80 - 3.00 x10*3/uL    Monocytes Absolute 0.56 0.05 - 0.80 x10*3/uL    Eosinophils Absolute 0.18 0.00 - 0.40 x10*3/uL    Basophils Absolute 0.03 0.00 - 0.10 x10*3/uL   Comprehensive Metabolic Panel   Result Value Ref Range    Glucose 97 74 - 99 mg/dL    Sodium 141 136 - 145 mmol/L    Potassium 4.3 3.5 - 5.3 mmol/L    Chloride 106 98 - 107 mmol/L    Bicarbonate 27 21 - 32 mmol/L    Anion Gap 12 10 - 20 mmol/L    Urea Nitrogen 33 (H) 6 - 23 mg/dL    Creatinine 1.56 (H) 0.50 - 1.30 mg/dL    eGFR 41 (L) >60 mL/min/1.73m*2    Calcium 9.0 8.6 - 10.3 mg/dL    Albumin 4.0 3.4 - 5.0 g/dL    Alkaline Phosphatase 40 33 - 136 U/L    Total Protein 6.5 6.4 - 8.2 g/dL    AST 13 9 - 39 U/L    Bilirubin, Total 0.6 0.0 - 1.2 mg/dL    ALT 13 10 - 52 U/L   Magnesium   Result Value Ref Range    Magnesium 2.09 1.60 - 2.40 mg/dL   Troponin I, High Sensitivity, Initial   Result Value Ref Range    Troponin I, High Sensitivity 21 (H) 0 - 20 ng/L   Troponin, High Sensitivity, 1 Hour   Result Value Ref Range    Troponin I, High Sensitivity 23 (H) 0 - 20 ng/L   B-type natriuretic peptide   Result Value Ref Range     (H) 0 - 99 pg/mL     XR chest 1 view    Result Date: 10/2/2023  Interpreted By:  Daron Christensen, STUDY: XR CHEST 1 VIEW;  10/2/2023 8:13 pm   INDICATION: Signs/Symptoms:near syncope.   COMPARISON: Chest radiograph 06/04/2022   ACCESSION NUMBER(S): [id number]   ORDERING CLINICIAN: MAIKOL RUDOLPH   FINDINGS: SUPPORT DEVICES: None.   CARDIOMEDIASTINAL SILHOUETTE: The heart is enlarged. Central pulmonary vascular congestion.   LUNGS: Diffuse interstitial prominence with hazy and patchy bilateral pulmonary opacities. Bibasilar volume loss and possible small bilateral pleural effusions. No discernible pneumothorax.   ABDOMEN: No remarkable upper abdominal findings.   BONES: No acute osseous abnormality.       1. Cardiomegaly and findings most suggestive of pulmonary edema.  Superimposed airspace disease is difficult to exclude.     Signed by: Daron Christensen 10/2/2023 8:34 PM Dictation workstation:   [id number]        Assessment/Plan   Principal Problem:    Irregular heartbeat  Active Problems:    Chronic obstructive pulmonary disease (CMS/HCC)    Dyspnea on exertion    Troponin level elevated    A-fib (CMS/HCC)    Presents to this facility for evaluation of atrial fibrillation that is rate controlled.  Not currently on oral anticoagulation.  Endorsing increased dyspnea with minimal exertion.  Denies orthopnea    Rate control appropriate at this time.  Patient resting comfortably    Cardiology consultation has been placed, appreciate recommendations and management    Maintain optimal cardiac electrolytes, potassium >4 and magnesium >2    Maintain telemetry    PT/OT, as indicated    DVT prophylaxis as ordered       I spent >75 minutes in the professional and overall care of this patient.      Jean Carlos Johnston, DO

## 2023-10-03 NOTE — PROGRESS NOTES
PROGRESS NOTE    Subjective   Patient seen and examined.  Resting in bed with no complaints of chest pain, burning or discomfort.  No lightheadedness or dizziness.  No palpitations.  He still endorses some dyspnea on exertion.  On telemetry patient with A-fib CVR.  Heart rate between the 60s and 70s.  Currently on supplemental O2 with saturations at 91%.  Not clinically fluid overloaded on exam.  Has been afebrile overnight.  Awaiting cardiology evaluation recommendations.  Labs reviewed creatinine remains stable at flat trend at 1.56 with a BUN of 27, GFR of 41.  Blood glucose 162.  Potassium 4.7 with sodium of 139.  Magnesium 2.10.    Objective     Last Recorded Vitals    Visit Vitals  /79 (BP Location: Left arm, Patient Position: Lying)   Pulse 74   Temp 36.5 °C (97.7 °F) (Temporal)   Resp 19        3 Day Weight Change: Unable to Calculate       Intake/Output Summary (Last 24 hours) at 10/3/2023 0834  Last data filed at 10/3/2023 0736  Gross per 24 hour   Intake --   Output 1775 ml   Net -1775 ml        Physical Exam  Vitals and nursing note reviewed.   Constitutional:       Appearance: Normal appearance.   HENT:      Head: Normocephalic and atraumatic.   Eyes:      Extraocular Movements: Extraocular movements intact.      Conjunctiva/sclera: Conjunctivae normal.   Cardiovascular:      Rate and Rhythm: Normal rate. Rhythm irregular.      Pulses: Normal pulses.   Pulmonary:      Effort: Pulmonary effort is normal.      Breath sounds: Normal breath sounds and air entry.   Abdominal:      General: Bowel sounds are normal.      Palpations: Abdomen is soft.   Musculoskeletal:      Cervical back: Normal range of motion and neck supple.   Skin:     General: Skin is warm and dry.   Neurological:      General: No focal deficit present.      Mental Status: He is alert. Mental status is at baseline.   Psychiatric:         Mood and Affect: Mood normal.         Behavior: Behavior normal. Behavior is cooperative.       Scheduled medications  polyethylene glycol, 17 g, oral, Daily      Continuous medications     PRN medications  PRN medications: acetaminophen **OR** acetaminophen **OR** acetaminophen, acetaminophen **OR** acetaminophen **OR** acetaminophen, ondansetron ODT **OR** ondansetron       Relevant Results    Results for orders placed or performed during the hospital encounter of 10/02/23 (from the past 96 hour(s))   CBC and Auto Differential   Result Value Ref Range    WBC 8.1 4.4 - 11.3 x10*3/uL    nRBC 0.0 0.0 - 0.0 /100 WBCs    RBC 3.56 (L) 4.50 - 5.90 x10*6/uL    Hemoglobin 10.0 (L) 13.5 - 17.5 g/dL    Hematocrit 32.3 (L) 41.0 - 52.0 %    MCV 91 80 - 100 fL    MCH 28.1 26.0 - 34.0 pg    MCHC 31.0 (L) 32.0 - 36.0 g/dL    RDW 13.6 11.5 - 14.5 %    Platelets 116 (L) 150 - 450 x10*3/uL    MPV 11.5 7.5 - 11.5 fL    Neutrophils % 77.3 40.0 - 80.0 %    Immature Granulocytes %, Automated 0.2 0.0 - 0.9 %    Lymphocytes % 13.0 13.0 - 44.0 %    Monocytes % 6.9 2.0 - 10.0 %    Eosinophils % 2.2 0.0 - 6.0 %    Basophils % 0.4 0.0 - 2.0 %    Neutrophils Absolute 6.28 (H) 1.60 - 5.50 x10*3/uL    Immature Granulocytes Absolute, Automated 0.02 0.00 - 0.50 x10*3/uL    Lymphocytes Absolute 1.06 0.80 - 3.00 x10*3/uL    Monocytes Absolute 0.56 0.05 - 0.80 x10*3/uL    Eosinophils Absolute 0.18 0.00 - 0.40 x10*3/uL    Basophils Absolute 0.03 0.00 - 0.10 x10*3/uL   Comprehensive Metabolic Panel   Result Value Ref Range    Glucose 97 74 - 99 mg/dL    Sodium 141 136 - 145 mmol/L    Potassium 4.3 3.5 - 5.3 mmol/L    Chloride 106 98 - 107 mmol/L    Bicarbonate 27 21 - 32 mmol/L    Anion Gap 12 10 - 20 mmol/L    Urea Nitrogen 33 (H) 6 - 23 mg/dL    Creatinine 1.56 (H) 0.50 - 1.30 mg/dL    eGFR 41 (L) >60 mL/min/1.73m*2    Calcium 9.0 8.6 - 10.3 mg/dL    Albumin 4.0 3.4 - 5.0 g/dL    Alkaline Phosphatase 40 33 - 136 U/L    Total Protein 6.5 6.4 - 8.2 g/dL    AST 13 9 - 39 U/L    Bilirubin, Total 0.6 0.0 - 1.2 mg/dL    ALT 13 10 - 52 U/L    Magnesium   Result Value Ref Range    Magnesium 2.09 1.60 - 2.40 mg/dL   Troponin I, High Sensitivity, Initial   Result Value Ref Range    Troponin I, High Sensitivity 21 (H) 0 - 20 ng/L   Troponin, High Sensitivity, 1 Hour   Result Value Ref Range    Troponin I, High Sensitivity 23 (H) 0 - 20 ng/L   B-type natriuretic peptide   Result Value Ref Range     (H) 0 - 99 pg/mL       Assessment/Plan     Principal Problem:    Irregular heartbeat  Active Problems:    Chronic obstructive pulmonary disease (CMS/[facility])    Dyspnea on exertion    Troponin level elevated    A-fib (CMS/[facility])     Presents to this facility for evaluation of atrial fibrillation that is rate controlled.  Not currently on oral anticoagulation.  Endorsing increased dyspnea with minimal exertion.  Denies orthopnea     PLAN  Monitor patient on telemetry  Labs pending this a.m.  Monitor and replace electrolytes per protocol  Maintain optimal cardiac electrolytes, potassium >4 and magnesium >2  Rate control appropriate at this time.  Patient resting comfortably  Cardiology consultation has been placed, appreciate recommendations and management  Echocardiogram to assess cardiac structure and function-pending the  Continue patient home medications as appropriate  PT/OT, as indicated  DVT prophylaxis as ordered  Anticipated length of stay 24 to 48 hours pending patient's clinical course and consultant recommendations.    Plan of care was discussed extensively with patient. Patient verbalized understanding through teach back method. All questions and concerns addressed upon examination.     Of note, this documentation is completed using the Dragon Dictation system (voice recognition software). There may be spelling and/or grammatical errors that were not corrected prior to final submission

## 2023-10-03 NOTE — CONSULTS
Reason For Consult  A. Maverick with aortic stenosis     History Of Present Illness  José Antonio Sandra is a 89 y.o. male presenting with pmhx of moderate aortic stenosis, HFpEF (EF 55-60% 5/23), HTN, prostate cancer s/p brachytherapy (1999), BPH, COPD (2L intermittent), and HLD presents to the [facility] ED for an abnormal rhythm found on his heart watch Sunday night through Monday morning. Patient states he was experiencing some worsening shortness of breath over the last days to weeks. He states that his watch has picked up atrial fibrillation intermittently multiple times for years, but it was always terminate within a day. Patient states he has never been on anticoagulation, denies having an MI, PE/DVT, or a stroke. He denies any symptoms of lightheadedness, dizziness, chest pain, palpitations, or nausea.      Past Medical History  Moderate aortic stenosis, HFpEF (EF 55-60% 5/23), HTN, prostate cancer s/p brachytherapy (1999), BPH, COPD (2L intermittent), and HLD     Surgical History  He has a past surgical history that includes Other surgical history (03/22/2021).     Social History  He reports that he has never smoked. He has never used smokeless tobacco. No history on file for alcohol use and drug use.    Family History  Family History   Problem Relation Name Age of Onset    Hypertension Mother      Heart attack Mother          Allergies  Penicillins    Review of Systems  BOLD is positive for:  Constitutional: fatigue, weight loss, fevers, chills  EENT: hearing loss, sinus pressure, visual changes, blurry vision  Respiratory: dry cough, productive cough, SOB, wheezing  Cardiovascular: chest pain, edema, palpitations  Gastrointestinal: abdominal pain, nausea, vomiting, diarrhea  Genitourinary: dysuria, hematuria, urinary frequency  Musculoskeletal: back pain, join pain, joint swelling, neck pain  Neurological: dizziness, paraesthesia, weakness, headaches, seizures, tremors  Behavioral/Psych: depression, anxiety,  hallucinations, SI, HI  Hematologic/lymphatic: anemia, bruising, bleeding, lymphedema  Endocrine: cold intolerance, heat intolerance, polydipsia, polyphagia  Allergic/Immunologic: itching, sneezing, rash, swelling      Physical Exam  Constitutional: A&Ox4, NAD, resting comfortable   Head and Face: Atraumatic, normocephalic   Eyes: Normal external exam, EOMI  ENT: Normal external inspection of ears and nose. Oropharynx normal.  Cardiovascular: RRR, S1/S2, moderate systolic murmur, no rubs, or gallops, radial pulses +2  Pulmonary: CTAB, no respiratory distress, no wheezing, rales or rhonchi, on 2L NC  Abdomen: +BS, soft, non-tender, nondistended, no guarding rigidity or rebound tenderness, no masses noted  MSK: 1+ pitting edema in ankles b/l, No joint swelling, normal movements of all extremities.   Neuro: No focal deficits, normal motor function, normal sensation, follows all commands  Skin- No lesions, contusions, or erythema.  Psychiatric: Judgment intact. Appropriate mood, affect and behavior      Last Recorded Vitals  Blood pressure 165/79, pulse 74, temperature 36.5 °C (97.7 °F), temperature source Temporal, resp. rate 19, weight 73 kg (160 lb 15 oz), SpO2 91 %.    Relevant Results  Results for orders placed or performed during the hospital encounter of 10/02/23 (from the past 24 hour(s))   CBC and Auto Differential   Result Value Ref Range    WBC 8.1 4.4 - 11.3 x10*3/uL    nRBC 0.0 0.0 - 0.0 /100 WBCs    RBC 3.56 (L) 4.50 - 5.90 x10*6/uL    Hemoglobin 10.0 (L) 13.5 - 17.5 g/dL    Hematocrit 32.3 (L) 41.0 - 52.0 %    MCV 91 80 - 100 fL    MCH 28.1 26.0 - 34.0 pg    MCHC 31.0 (L) 32.0 - 36.0 g/dL    RDW 13.6 11.5 - 14.5 %    Platelets 116 (L) 150 - 450 x10*3/uL    MPV 11.5 7.5 - 11.5 fL    Neutrophils % 77.3 40.0 - 80.0 %    Immature Granulocytes %, Automated 0.2 0.0 - 0.9 %    Lymphocytes % 13.0 13.0 - 44.0 %    Monocytes % 6.9 2.0 - 10.0 %    Eosinophils % 2.2 0.0 - 6.0 %    Basophils % 0.4 0.0 - 2.0 %     Neutrophils Absolute 6.28 (H) 1.60 - 5.50 x10*3/uL    Immature Granulocytes Absolute, Automated 0.02 0.00 - 0.50 x10*3/uL    Lymphocytes Absolute 1.06 0.80 - 3.00 x10*3/uL    Monocytes Absolute 0.56 0.05 - 0.80 x10*3/uL    Eosinophils Absolute 0.18 0.00 - 0.40 x10*3/uL    Basophils Absolute 0.03 0.00 - 0.10 x10*3/uL   Comprehensive Metabolic Panel   Result Value Ref Range    Glucose 97 74 - 99 mg/dL    Sodium 141 136 - 145 mmol/L    Potassium 4.3 3.5 - 5.3 mmol/L    Chloride 106 98 - 107 mmol/L    Bicarbonate 27 21 - 32 mmol/L    Anion Gap 12 10 - 20 mmol/L    Urea Nitrogen 33 (H) 6 - 23 mg/dL    Creatinine 1.56 (H) 0.50 - 1.30 mg/dL    eGFR 41 (L) >60 mL/min/1.73m*2    Calcium 9.0 8.6 - 10.3 mg/dL    Albumin 4.0 3.4 - 5.0 g/dL    Alkaline Phosphatase 40 33 - 136 U/L    Total Protein 6.5 6.4 - 8.2 g/dL    AST 13 9 - 39 U/L    Bilirubin, Total 0.6 0.0 - 1.2 mg/dL    ALT 13 10 - 52 U/L   Magnesium   Result Value Ref Range    Magnesium 2.09 1.60 - 2.40 mg/dL   Troponin I, High Sensitivity, Initial   Result Value Ref Range    Troponin I, High Sensitivity 21 (H) 0 - 20 ng/L   Troponin, High Sensitivity, 1 Hour   Result Value Ref Range    Troponin I, High Sensitivity 23 (H) 0 - 20 ng/L   B-type natriuretic peptide   Result Value Ref Range     (H) 0 - 99 pg/mL   Comprehensive metabolic panel   Result Value Ref Range    Glucose 162 (H) 74 - 99 mg/dL    Sodium 139 136 - 145 mmol/L    Potassium 4.0 3.5 - 5.3 mmol/L    Chloride 105 98 - 107 mmol/L    Bicarbonate 25 21 - 32 mmol/L    Anion Gap 13 10 - 20 mmol/L    Urea Nitrogen 27 (H) 6 - 23 mg/dL    Creatinine 1.56 (H) 0.50 - 1.30 mg/dL    eGFR 41 (L) >60 mL/min/1.73m*2    Calcium 9.0 8.6 - 10.3 mg/dL    Albumin 3.6 3.4 - 5.0 g/dL    Alkaline Phosphatase 37 33 - 136 U/L    Total Protein 5.9 (L) 6.4 - 8.2 g/dL    AST 15 9 - 39 U/L    Bilirubin, Total 0.7 0.0 - 1.2 mg/dL    ALT 10 10 - 52 U/L   Magnesium   Result Value Ref Range    Magnesium 2.10 1.60 - 2.40 mg/dL   CBC    Result Value Ref Range    WBC 8.2 4.4 - 11.3 x10*3/uL    nRBC 0.0 0.0 - 0.0 /100 WBCs    RBC 3.57 (L) 4.50 - 5.90 x10*6/uL    Hemoglobin 10.3 (L) 13.5 - 17.5 g/dL    Hematocrit 33.7 (L) 41.0 - 52.0 %    MCV 94 80 - 100 fL    MCH 28.9 26.0 - 34.0 pg    MCHC 30.6 (L) 32.0 - 36.0 g/dL    RDW 13.5 11.5 - 14.5 %    Platelets 122 (L) 150 - 450 x10*3/uL    MPV 12.3 (H) 7.5 - 11.5 fL    XR chest 1 view    Result Date: 10/2/2023  Interpreted By:  Daron Crhistensen, STUDY: XR CHEST 1 VIEW;  10/2/2023 8:13 pm   INDICATION: Signs/Symptoms:near syncope.   COMPARISON: Chest radiograph 06/04/2022   ACCESSION NUMBER(S): [id number]   ORDERING CLINICIAN: MAIKOL RUDOLPH   FINDINGS: SUPPORT DEVICES: None.   CARDIOMEDIASTINAL SILHOUETTE: The heart is enlarged. Central pulmonary vascular congestion.   LUNGS: Diffuse interstitial prominence with hazy and patchy bilateral pulmonary opacities. Bibasilar volume loss and possible small bilateral pleural effusions. No discernible pneumothorax.   ABDOMEN: No remarkable upper abdominal findings.   BONES: No acute osseous abnormality.       1. Cardiomegaly and findings most suggestive of pulmonary edema. Superimposed airspace disease is difficult to exclude.     Signed by: Daron Christensen 10/2/2023 8:34 PM Dictation workstation:   [id number]       Assessment/Plan   Patient is a 89 year old male with pmhx of Moderate aortic stenosis, HFpEF (EF 55-60% 5/23), HTN, prostate cancer s/p brachytherapy (1999), BPH, COPD (2L intermittent), and HLD presents with an abnormal rhythm and SOB and is admitted for paroxsymal atrial fibrillation not on anticoagulation. Evaluation of valvular a.fib and may need anticoagulation with warfarin.    #Paroxsymal A.fib poss valvular, [id number] Score 4 (4.8% risk)  #Moderate Aortic Stenosis  #HFpEF  #COPD (2L intermittent)  #HTN    Plan:  1.) Anticoagulation: eliquis vs warfarin  2.) Patient has not tolerated beta blockers in the past, as patient is rate controlled without  medication, he likely does not need rhythm control.     Cali BellofferDO      Patient seen and evaluated with resident.   Review of the ECG's show no atrial fibrillation but rather sinus with freq PAC's.   Exam consistent with at least mod to severe aortic stenosis by evidence of late peaking murmur with slightly delayed and weak carotid upstrokes. Exam with evidence of LE edema consistent with diastolic HF.     Agree with continued diuresis given symptomatic improvement.   Echocardiogram ordered to reassess AV and LV function.    No need for anticoagulation because there is no evidence of atrial fibrillation.

## 2023-10-04 ENCOUNTER — APPOINTMENT (OUTPATIENT)
Dept: CARDIOLOGY | Facility: HOSPITAL | Age: 88
DRG: 280 | End: 2023-10-04
Payer: MEDICARE

## 2023-10-04 PROBLEM — K21.9 GASTROESOPHAGEAL REFLUX DISEASE: Status: ACTIVE | Noted: 2020-12-15

## 2023-10-04 PROBLEM — I48.20 CHRONIC A-FIB (MULTI): Status: ACTIVE | Noted: 2023-10-04

## 2023-10-04 PROBLEM — I10 ESSENTIAL HYPERTENSION: Status: ACTIVE | Noted: 2019-11-12

## 2023-10-04 PROBLEM — J44.9 COPD, SEVERE (MULTI): Status: ACTIVE | Noted: 2019-12-04

## 2023-10-04 PROBLEM — C61 MALIGNANT NEOPLASM OF PROSTATE (MULTI): Status: ACTIVE | Noted: 2020-02-13

## 2023-10-04 LAB
ANION GAP SERPL CALC-SCNC: 12 MMOL/L (ref 10–20)
BUN SERPL-MCNC: 31 MG/DL (ref 6–23)
CALCIUM SERPL-MCNC: 9.2 MG/DL (ref 8.6–10.3)
CHLORIDE SERPL-SCNC: 103 MMOL/L (ref 98–107)
CO2 SERPL-SCNC: 29 MMOL/L (ref 21–32)
CREAT SERPL-MCNC: 1.57 MG/DL (ref 0.5–1.3)
EJECTION FRACTION APICAL 4 CHAMBER: 41.5
ERYTHROCYTE [DISTWIDTH] IN BLOOD BY AUTOMATED COUNT: 13.6 % (ref 11.5–14.5)
GFR SERPL CREATININE-BSD FRML MDRD: 41 ML/MIN/1.73M*2
GLUCOSE SERPL-MCNC: 86 MG/DL (ref 74–99)
HCT VFR BLD AUTO: 32.7 % (ref 41–52)
HGB BLD-MCNC: 10 G/DL (ref 13.5–17.5)
MAGNESIUM SERPL-MCNC: 2.03 MG/DL (ref 1.6–2.4)
MCH RBC QN AUTO: 28.2 PG (ref 26–34)
MCHC RBC AUTO-ENTMCNC: 30.6 G/DL (ref 32–36)
MCV RBC AUTO: 92 FL (ref 80–100)
NRBC BLD-RTO: 0 /100 WBCS (ref 0–0)
PLATELET # BLD AUTO: 135 X10*3/UL (ref 150–450)
PMV BLD AUTO: 11.7 FL (ref 7.5–11.5)
POTASSIUM SERPL-SCNC: 3.8 MMOL/L (ref 3.5–5.3)
RBC # BLD AUTO: 3.55 X10*6/UL (ref 4.5–5.9)
SODIUM SERPL-SCNC: 140 MMOL/L (ref 136–145)
WBC # BLD AUTO: 8.7 X10*3/UL (ref 4.4–11.3)

## 2023-10-04 PROCEDURE — 99232 SBSQ HOSP IP/OBS MODERATE 35: CPT

## 2023-10-04 PROCEDURE — 2500000004 HC RX 250 GENERAL PHARMACY W/ HCPCS (ALT 636 FOR OP/ED): Performed by: STUDENT IN AN ORGANIZED HEALTH CARE EDUCATION/TRAINING PROGRAM

## 2023-10-04 PROCEDURE — 94640 AIRWAY INHALATION TREATMENT: CPT

## 2023-10-04 PROCEDURE — 2500000001 HC RX 250 WO HCPCS SELF ADMINISTERED DRUGS (ALT 637 FOR MEDICARE OP): Performed by: STUDENT IN AN ORGANIZED HEALTH CARE EDUCATION/TRAINING PROGRAM

## 2023-10-04 PROCEDURE — 97110 THERAPEUTIC EXERCISES: CPT | Mod: GP

## 2023-10-04 PROCEDURE — 93306 TTE W/DOPPLER COMPLETE: CPT

## 2023-10-04 PROCEDURE — 2500000002 HC RX 250 W HCPCS SELF ADMINISTERED DRUGS (ALT 637 FOR MEDICARE OP, ALT 636 FOR OP/ED): Performed by: STUDENT IN AN ORGANIZED HEALTH CARE EDUCATION/TRAINING PROGRAM

## 2023-10-04 PROCEDURE — 85027 COMPLETE CBC AUTOMATED: CPT | Performed by: NURSE PRACTITIONER

## 2023-10-04 PROCEDURE — 93306 TTE W/DOPPLER COMPLETE: CPT | Performed by: INTERNAL MEDICINE

## 2023-10-04 PROCEDURE — 97116 GAIT TRAINING THERAPY: CPT | Mod: GP

## 2023-10-04 PROCEDURE — 36415 COLL VENOUS BLD VENIPUNCTURE: CPT | Performed by: INTERNAL MEDICINE

## 2023-10-04 PROCEDURE — 83735 ASSAY OF MAGNESIUM: CPT | Performed by: NURSE PRACTITIONER

## 2023-10-04 PROCEDURE — 1200000002 HC GENERAL ROOM WITH TELEMETRY DAILY

## 2023-10-04 PROCEDURE — 80048 BASIC METABOLIC PNL TOTAL CA: CPT | Performed by: NURSE PRACTITIONER

## 2023-10-04 PROCEDURE — 36415 COLL VENOUS BLD VENIPUNCTURE: CPT | Performed by: NURSE PRACTITIONER

## 2023-10-04 PROCEDURE — 99231 SBSQ HOSP IP/OBS SF/LOW 25: CPT | Performed by: NURSE PRACTITIONER

## 2023-10-04 PROCEDURE — G0378 HOSPITAL OBSERVATION PER HR: HCPCS

## 2023-10-04 PROCEDURE — 2500000004 HC RX 250 GENERAL PHARMACY W/ HCPCS (ALT 636 FOR OP/ED): Performed by: INTERNAL MEDICINE

## 2023-10-04 RX ORDER — TORSEMIDE 20 MG/1
10 TABLET ORAL DAILY PRN
Status: DISCONTINUED | OUTPATIENT
Start: 2023-10-04 | End: 2024-02-12 | Stop reason: HOSPADM

## 2023-10-04 RX ADMIN — FORMOTEROL FUMARATE 20 MCG: 20 SOLUTION RESPIRATORY (INHALATION) at 08:30

## 2023-10-04 RX ADMIN — TORSEMIDE 10 MG: 20 TABLET ORAL at 10:40

## 2023-10-04 RX ADMIN — ASPIRIN 81 MG: 81 TABLET, COATED ORAL at 10:40

## 2023-10-04 RX ADMIN — AMLODIPINE BESYLATE 10 MG: 10 TABLET ORAL at 10:40

## 2023-10-04 RX ADMIN — SIMVASTATIN 40 MG: 40 TABLET, FILM COATED ORAL at 21:24

## 2023-10-04 RX ADMIN — BUDESONIDE 0.5 MG: 0.5 INHALANT RESPIRATORY (INHALATION) at 08:30

## 2023-10-04 RX ADMIN — CHOLECALCIFEROL TAB 125 MCG (5000 UNIT) 5000 UNITS: 125 TAB at 10:39

## 2023-10-04 RX ADMIN — FAMOTIDINE 20 MG: 20 TABLET ORAL at 10:39

## 2023-10-04 RX ADMIN — LOSARTAN POTASSIUM 50 MG: 50 TABLET, FILM COATED ORAL at 10:40

## 2023-10-04 RX ADMIN — TAMSULOSIN HYDROCHLORIDE 0.4 MG: 0.4 CAPSULE ORAL at 10:39

## 2023-10-04 ASSESSMENT — PAIN - FUNCTIONAL ASSESSMENT
PAIN_FUNCTIONAL_ASSESSMENT: 0-10
PAIN_FUNCTIONAL_ASSESSMENT: 0-10

## 2023-10-04 ASSESSMENT — COGNITIVE AND FUNCTIONAL STATUS - GENERAL
DRESSING REGULAR LOWER BODY CLOTHING: A LITTLE
WALKING IN HOSPITAL ROOM: A LITTLE
TURNING FROM BACK TO SIDE WHILE IN FLAT BAD: A LITTLE
STANDING UP FROM CHAIR USING ARMS: A LITTLE
TURNING FROM BACK TO SIDE WHILE IN FLAT BAD: A LITTLE
STANDING UP FROM CHAIR USING ARMS: A LITTLE
CLIMB 3 TO 5 STEPS WITH RAILING: A LITTLE
MOVING TO AND FROM BED TO CHAIR: A LITTLE
DAILY ACTIVITIY SCORE: 22
MOVING FROM LYING ON BACK TO SITTING ON SIDE OF FLAT BED WITH BEDRAILS: A LITTLE
MOVING FROM LYING ON BACK TO SITTING ON SIDE OF FLAT BED WITH BEDRAILS: A LITTLE
HELP NEEDED FOR BATHING: A LITTLE
DRESSING REGULAR LOWER BODY CLOTHING: A LITTLE
HELP NEEDED FOR BATHING: A LITTLE
DAILY ACTIVITIY SCORE: 22
WALKING IN HOSPITAL ROOM: A LITTLE
TURNING FROM BACK TO SIDE WHILE IN FLAT BAD: A LITTLE
MOVING TO AND FROM BED TO CHAIR: A LITTLE
MOBILITY SCORE: 18
MOVING TO AND FROM BED TO CHAIR: A LITTLE
MOBILITY SCORE: 18
MOBILITY SCORE: 18
STANDING UP FROM CHAIR USING ARMS: A LITTLE
MOVING FROM LYING ON BACK TO SITTING ON SIDE OF FLAT BED WITH BEDRAILS: A LITTLE
WALKING IN HOSPITAL ROOM: A LITTLE

## 2023-10-04 ASSESSMENT — PAIN SCALES - GENERAL
PAINLEVEL_OUTOF10: 0 - NO PAIN
PAINLEVEL_OUTOF10: 0 - NO PAIN

## 2023-10-04 NOTE — PROGRESS NOTES
"José Antonio Sandra is a 89 y.o. male on day 0 of admission presenting with Irregular heartbeat.    Subjective   Patient seen and examined sitting in chair with 2 L nasal cannula.  States his shortness of breath is improved with diuresis.  Patient denies any worsening shortness of breath, chest pain, nausea, diarrhea, or any other concerning symptoms.       Objective     Physical Exam  Constitutional: A&Ox4, NAD, resting comfortable   Head and Face: Atraumatic, normocephalic   Eyes: Normal external exam, EOMI  ENT: Normal external inspection of ears and nose. Oropharynx normal.  Cardiovascular: RRR, S1/S2, moderate systolic murmur, no rubs, or gallops, radial pulses +2  Pulmonary: CTAB, no respiratory distress, no wheezing, rales or rhonchi, on 2L NC  Abdomen: +BS, soft, non-tender, nondistended, no guarding rigidity or rebound tenderness, no masses noted  MSK: 1+ pitting edema in ankles b/l improving, No joint swelling, normal movements of all extremities.   Neuro: No focal deficits, normal motor function, normal sensation, follows all commands  Skin- No lesions, contusions, or erythema.  Psychiatric: Judgment intact. Appropriate mood, affect and behavior   Last Recorded Vitals  Blood pressure 137/77, pulse 82, temperature 36.5 °C (97.7 °F), temperature source Temporal, resp. rate 16, height 1.67 m (5' 5.75\"), weight 72.5 kg (159 lb 13.3 oz), SpO2 92 %.  Intake/Output last 3 Shifts:  I/O last 3 completed shifts:  In: 960 (13.2 mL/kg) [P.O.:960]  Out: 2625 (36.2 mL/kg) [Urine:2625 (1 mL/kg/hr)]  Weight: 72.5 kg     Relevant Results  Results for orders placed or performed during the hospital encounter of 10/02/23 (from the past 24 hour(s))   CBC   Result Value Ref Range    WBC 8.7 4.4 - 11.3 x10*3/uL    nRBC 0.0 0.0 - 0.0 /100 WBCs    RBC 3.55 (L) 4.50 - 5.90 x10*6/uL    Hemoglobin 10.0 (L) 13.5 - 17.5 g/dL    Hematocrit 32.7 (L) 41.0 - 52.0 %    MCV 92 80 - 100 fL    MCH 28.2 26.0 - 34.0 pg    MCHC 30.6 (L) 32.0 - 36.0 " g/dL    RDW 13.6 11.5 - 14.5 %    Platelets 135 (L) 150 - 450 x10*3/uL    MPV 11.7 (H) 7.5 - 11.5 fL   Basic metabolic panel   Result Value Ref Range    Glucose 86 74 - 99 mg/dL    Sodium 140 136 - 145 mmol/L    Potassium 3.8 3.5 - 5.3 mmol/L    Chloride 103 98 - 107 mmol/L    Bicarbonate 29 21 - 32 mmol/L    Anion Gap 12 10 - 20 mmol/L    Urea Nitrogen 31 (H) 6 - 23 mg/dL    Creatinine 1.57 (H) 0.50 - 1.30 mg/dL    eGFR 41 (L) >60 mL/min/1.73m*2    Calcium 9.2 8.6 - 10.3 mg/dL   Magnesium   Result Value Ref Range    Magnesium 2.03 1.60 - 2.40 mg/dL   Transthoracic Echo (TTE) Complete   Result Value Ref Range    BSA 1.83 m2    XR chest 1 view    Result Date: 10/2/2023  Interpreted By:  Daron Christensen, STUDY: XR CHEST 1 VIEW;  10/2/2023 8:13 pm   INDICATION: Signs/Symptoms:near syncope.   COMPARISON: Chest radiograph 06/04/2022   ACCESSION NUMBER(S): [id number]   ORDERING CLINICIAN: MAIKOL RUDOLPH   FINDINGS: SUPPORT DEVICES: None.   CARDIOMEDIASTINAL SILHOUETTE: The heart is enlarged. Central pulmonary vascular congestion.   LUNGS: Diffuse interstitial prominence with hazy and patchy bilateral pulmonary opacities. Bibasilar volume loss and possible small bilateral pleural effusions. No discernible pneumothorax.   ABDOMEN: No remarkable upper abdominal findings.   BONES: No acute osseous abnormality.       1. Cardiomegaly and findings most suggestive of pulmonary edema. Superimposed airspace disease is difficult to exclude.     Signed by: Daron Christensen 10/2/2023 8:34 PM Dictation workstation:   [id number]        Assessment/Plan   Principal Problem:    Irregular heartbeat  Active Problems:    Chronic obstructive pulmonary disease (CMS/HCC)    Dyspnea on exertion    Troponin level elevated    A-fib (CMS/HCC)    Acute on chronic diastolic heart failure (CMS/HCC)    Chronic a-fib (CMS/HCC)    Patient is a 89 year old male with pmhx of Moderate aortic stenosis, HFpEF (EF 55-60% 5/23), HTN, prostate cancer s/p  brachytherapy (1999), BPH, COPD (2L intermittent), and HLD presents with an abnormal rhythm and SOB and is admitted acute of chronic heart failure exacerbation.     #Sinus Rhythm with PACs  #Moderate to Severe Aortic Stenosis  #Acute on Chronic HFpEF  #COPD (2L intermittent)  #HTN     Plan:  1.) Continue home toresmide dose  2.) Patient to weigh self daily, if greater than or equal to 3 lbs weight gain can double home torsemide dose for 1-3 days.   3.) Nutritional consult  4.) OK for discharge per primary team      Cali Edmondson DO

## 2023-10-04 NOTE — PROGRESS NOTES
PROGRESS NOTE    Subjective   Patient seen and examined this morning.  Awake, alert and oriented x4 communicating clearly.  Ambulated well with physical therapy utilizing his baseline of 2 L of supplemental O2 due to COPD and chronic respiratory failure.  Patient not clinically decompensated from heart failure standpoint.  On telemetry review patient with sinus rhythm with frequent PACs and nonconducted PACs.  No palpitations.  Vital signs reviewed overnight blood pressure 101/59.  Heart rate of 55 bpm. On supplemental O2 2LPM with oxygen saturations at 97%.    Objective     Last Recorded Vitals    Visit Vitals  /59 (BP Location: Left arm)   Pulse 55   Temp 36.2 °C (97.2 °F) (Temporal)   Resp 18        3 Day Weight Change: Unable to Calculate       Intake/Output Summary (Last 24 hours) at 10/4/2023 0832  Last data filed at 10/4/2023 0353  Gross per 24 hour   Intake 960 ml   Output 850 ml   Net 110 ml        Physical Exam  Vitals and nursing note reviewed.   Constitutional:       Appearance: Normal appearance.   HENT:      Head: Normocephalic and atraumatic.   Eyes:      Extraocular Movements: Extraocular movements intact.      Conjunctiva/sclera: Conjunctivae normal.   Cardiovascular:      Rate and Rhythm: Normal rate.  Giller rate and rhythm     Pulses: Normal pulses.   Pulmonary:      Effort: Pulmonary effort is normal.      Breath sounds: Left base with expiratory wheezing.  On chronic supplemental O2 at 2 L/min  Abdominal:      General: Bowel sounds are normal.      Palpations: Abdomen is soft.   Musculoskeletal:      Cervical back: Normal range of motion and neck supple.   Skin:     General: Skin is warm and dry.   Neurological:      General: No focal deficit present.      Mental Status: He is alert.  Alert and oriented x3.  Communicating clearly  Psychiatric:         Mood and Affect: Mood normal.         Behavior: Behavior normal. Behavior is cooperative.      Scheduled medications  amLODIPine, 10 mg,  oral, Daily  apixaban, 2.5 mg, oral, q12h  aspirin, 81 mg, oral, Daily  budesonide, 0.5 mg, nebulization, BID  cholecalciferol, 5,000 Units, oral, Daily  famotidine, 20 mg, oral, Daily  formoterol, 20 mcg, nebulization, q12h  losartan, 50 mg, oral, Daily  polyethylene glycol, 17 g, oral, Daily  simvastatin, 40 mg, oral, Nightly  tamsulosin, 0.4 mg, oral, Daily  torsemide, 10 mg, oral, Daily      Continuous medications     PRN medications  PRN medications: acetaminophen **OR** acetaminophen **OR** acetaminophen, acetaminophen **OR** acetaminophen **OR** acetaminophen, albuterol, ondansetron ODT **OR** ondansetron, oxygen       Relevant Results    Results for orders placed or performed during the hospital encounter of 10/02/23 (from the past 96 hour(s))   CBC and Auto Differential   Result Value Ref Range    WBC 8.1 4.4 - 11.3 x10*3/uL    nRBC 0.0 0.0 - 0.0 /100 WBCs    RBC 3.56 (L) 4.50 - 5.90 x10*6/uL    Hemoglobin 10.0 (L) 13.5 - 17.5 g/dL    Hematocrit 32.3 (L) 41.0 - 52.0 %    MCV 91 80 - 100 fL    MCH 28.1 26.0 - 34.0 pg    MCHC 31.0 (L) 32.0 - 36.0 g/dL    RDW 13.6 11.5 - 14.5 %    Platelets 116 (L) 150 - 450 x10*3/uL    MPV 11.5 7.5 - 11.5 fL    Neutrophils % 77.3 40.0 - 80.0 %    Immature Granulocytes %, Automated 0.2 0.0 - 0.9 %    Lymphocytes % 13.0 13.0 - 44.0 %    Monocytes % 6.9 2.0 - 10.0 %    Eosinophils % 2.2 0.0 - 6.0 %    Basophils % 0.4 0.0 - 2.0 %    Neutrophils Absolute 6.28 (H) 1.60 - 5.50 x10*3/uL    Immature Granulocytes Absolute, Automated 0.02 0.00 - 0.50 x10*3/uL    Lymphocytes Absolute 1.06 0.80 - 3.00 x10*3/uL    Monocytes Absolute 0.56 0.05 - 0.80 x10*3/uL    Eosinophils Absolute 0.18 0.00 - 0.40 x10*3/uL    Basophils Absolute 0.03 0.00 - 0.10 x10*3/uL   Comprehensive Metabolic Panel   Result Value Ref Range    Glucose 97 74 - 99 mg/dL    Sodium 141 136 - 145 mmol/L    Potassium 4.3 3.5 - 5.3 mmol/L    Chloride 106 98 - 107 mmol/L    Bicarbonate 27 21 - 32 mmol/L    Anion Gap 12 10 - 20  mmol/L    Urea Nitrogen 33 (H) 6 - 23 mg/dL    Creatinine 1.56 (H) 0.50 - 1.30 mg/dL    eGFR 41 (L) >60 mL/min/1.73m*2    Calcium 9.0 8.6 - 10.3 mg/dL    Albumin 4.0 3.4 - 5.0 g/dL    Alkaline Phosphatase 40 33 - 136 U/L    Total Protein 6.5 6.4 - 8.2 g/dL    AST 13 9 - 39 U/L    Bilirubin, Total 0.6 0.0 - 1.2 mg/dL    ALT 13 10 - 52 U/L   Magnesium   Result Value Ref Range    Magnesium 2.09 1.60 - 2.40 mg/dL   Troponin I, High Sensitivity, Initial   Result Value Ref Range    Troponin I, High Sensitivity 21 (H) 0 - 20 ng/L   Troponin, High Sensitivity, 1 Hour   Result Value Ref Range    Troponin I, High Sensitivity 23 (H) 0 - 20 ng/L   B-type natriuretic peptide   Result Value Ref Range     (H) 0 - 99 pg/mL   Comprehensive metabolic panel   Result Value Ref Range    Glucose 162 (H) 74 - 99 mg/dL    Sodium 139 136 - 145 mmol/L    Potassium 4.0 3.5 - 5.3 mmol/L    Chloride 105 98 - 107 mmol/L    Bicarbonate 25 21 - 32 mmol/L    Anion Gap 13 10 - 20 mmol/L    Urea Nitrogen 27 (H) 6 - 23 mg/dL    Creatinine 1.56 (H) 0.50 - 1.30 mg/dL    eGFR 41 (L) >60 mL/min/1.73m*2    Calcium 9.0 8.6 - 10.3 mg/dL    Albumin 3.6 3.4 - 5.0 g/dL    Alkaline Phosphatase 37 33 - 136 U/L    Total Protein 5.9 (L) 6.4 - 8.2 g/dL    AST 15 9 - 39 U/L    Bilirubin, Total 0.7 0.0 - 1.2 mg/dL    ALT 10 10 - 52 U/L   Magnesium   Result Value Ref Range    Magnesium 2.10 1.60 - 2.40 mg/dL   CBC   Result Value Ref Range    WBC 8.2 4.4 - 11.3 x10*3/uL    nRBC 0.0 0.0 - 0.0 /100 WBCs    RBC 3.57 (L) 4.50 - 5.90 x10*6/uL    Hemoglobin 10.3 (L) 13.5 - 17.5 g/dL    Hematocrit 33.7 (L) 41.0 - 52.0 %    MCV 94 80 - 100 fL    MCH 28.9 26.0 - 34.0 pg    MCHC 30.6 (L) 32.0 - 36.0 g/dL    RDW 13.5 11.5 - 14.5 %    Platelets 122 (L) 150 - 450 x10*3/uL    MPV 12.3 (H) 7.5 - 11.5 fL        Assessment/Plan     Principal Problem:    Irregular heartbeat  Active Problems:    Chronic obstructive pulmonary disease (CMS/HCC)    Dyspnea on exertion    Troponin  level elevated    A-fib (CMS/[facility])    Acute on chronic diastolic heart failure (CMS/[facility])     PLAN  Patient labs reviewed, renal function remaining at baseline at 1.57.  Monitor patient on telemetry: Normal sinus rhythm, frequent PACs and nonconducted PACs  Monitor and replace electrolytes per protocol  Maintain optimal cardiac electrolytes, potassium >4 and magnesium >2  Cardiology following: On continued review of telemetry sinus rhythm with PACs and nonconducted PACs-no indication for DOAC at this time.  Eliquis discontinued.  Echocardiogram to assess cardiac structure and function-pending to be done   Continue patient home medications as appropriate  PT/OT: [facility]   DVT prophylaxis as ordered  DCC for discharge planning  Discharge plan discussed with patient DC on rounds.  Anticipate discharge tomorrow once patient remains medically stable overnight.    Plan of care was discussed extensively with patient. Patient verbalized understanding through teach back method. All questions and concerns addressed upon examination.     Of note, this documentation is completed using the Dragon Dictation system (voice recognition software). There may be spelling and/or grammatical errors that were not corrected prior to final submission

## 2023-10-04 NOTE — PROGRESS NOTES
Physical Therapy    Physical Therapy Treatment    Patient Name: José Antonio Sandra  MRN: [id number]  Today's Date: 10/4/2023  Time Calculation  Start Time: 0907  Stop Time: 0937  Time Calculation (min): 30 min       Assessment/Plan   PT Assessment  PT Assessment Results: Decreased endurance  Rehab Prognosis: Good  Evaluation/Treatment Tolerance: Patient tolerated treatment well  Medical Staff Made Aware: Yes  End of Session Communication: Bedside nurse  End of Session Patient Position: Up in chair, Alarm on  PT Plan  Inpatient/Swing Bed or Outpatient: Inpatient  PT Plan  Treatment/Interventions: Gait training, Transfer training, Strengthening  PT Plan: Skilled PT  PT Frequency: 3 times per week  PT Discharge Recommendations: Low intensity level of continued care  Equipment Recommended upon Discharge: Straight cane  PT Recommended Transfer Status: Stand by assist      General Visit Information:   PT  Visit  PT Received On: 10/04/23  General  Prior to Session Communication: Bedside nurse  Patient Position Received: Bed, 3 rail up    Subjective   Precautions:  Precautions  Medical Precautions: Fall precautions  Vital Signs:  Vital Signs  SpO2: 92 % (Patient is on 2L 02 per nasal cannula throughout session. after gait patient sp02 94%)    Objective   Pain:  Pain Assessment  Pain Assessment: 0-10  Pain Score: 0 - No pain  Cognition:     Postural Control:     Extremity/Trunk Assessments:    Activity Tolerance:  Activity Tolerance  Endurance: Tolerates 30 min exercise with multiple rests  Treatments:  Therapeutic Exercise  Therapeutic Exercise Performed: Yes (seated therex  heel raises , toe raises,  laq, marching hip abduction x 20 reps B with rest periods for 02 recovery)         Bed Mobility 1  Bed Mobility 1: Supine to sitting  Level of Assistance 1: Close supervision  Bed Mobility 2  Bed Mobility  2: Sitting to supine  Level of Assistance 2: Close supervision    Ambulation/Gait Training  Ambulation/Gait Training  Performed: Yes  Ambulation/Gait Training 1  Surface 1: Level tile  Device 1: Rolling walker  Gait Support Devices: Gait belt  Assistance 1: Contact guard  Comments/Distance (ft) 1: 200 (Patient dmeos sow steady gait with reciprocating steps)  Transfer 1  Transfer From 1: Sit to  Transfer to 1: Stand  Transfer Device 1: Gait belt  Transfer Level of Assistance 1: Contact guard    Outcome Measures:  [facility] Basic Mobility  Turning from your back to your side while in a flat bed without using bedrails: A little  Moving from lying on your back to sitting on the side of a flat bed without using bedrails: A little  Moving to and from bed to chair (including a wheelchair): A little  Standing up from a chair using your arms (e.g. wheelchair or bedside chair): A little  To walk in hospital room: A little  Climbing 3-5 steps with railing: A little  Basic Mobility - Total Score: 18    Education Documentation  Mobility Training, taught by Julia De La Cruz PTA at 10/4/2023  9:50 AM.  Learner: Patient  Readiness: Eager  Method: Explanation  Response: Verbalizes Understanding, Demonstrated Understanding    ADL Training, taught by Julia De La Cruz PTA at 10/4/2023  9:50 AM.  Learner: Patient  Readiness: Eager  Method: Explanation  Response: Verbalizes Understanding, Demonstrated Understanding    Education Comments  No comments found.        OP EDUCATION:       Encounter Problems       Encounter Problems (Resolved)       PT Problem       Pt will be able to perform all bed mobility tasks with Mod I.  (Met)       Start:  10/03/23    Expected End:  10/17/23    Resolved:  10/03/23         Pt will perform all transfers with Mod I and LRAD with proper safety mechanics.   (Met)       Start:  10/03/23    Expected End:  10/17/23    Resolved:  10/03/23         Pt will ambulate 50ft with Mod I using LRAD for improved functional independence.  (Met)       Start:  10/03/23    Expected End:  10/17/23    Resolved:  10/03/23         Pt will  demonstrate at least 4+/5 BLE strength for ease with functional mobility.   (Met)       Start:  10/03/23    Expected End:  10/17/23    Resolved:  10/03/23            PT Problem       Pt will be able to ambulate at least 50 ft with < or equal to 1 rest break while maintaining SpO2 > 90%. (Met)       Start:  10/03/23    Resolved:  10/04/23

## 2023-10-04 NOTE — DISCHARGE INSTRUCTIONS
Diet:  Limit sodium intake to 2,000 mg per day.    Medications:   Weigh yourself daily with your next standing weight used as your baseline. If you see greater than or equal to 3 lbs weight gain, please take 2x your home Torsemide dose for 1-2 days.   Continue all other home medications as prescribed.    Appointments:  Follow up in office with your NP in cardiology or with Dr. Mauro in 1-2 weeks.

## 2023-10-05 ENCOUNTER — NUTRITION (OUTPATIENT)
Dept: NUTRITION | Facility: HOSPITAL | Age: 88
End: 2023-10-05
Payer: MEDICARE

## 2023-10-05 VITALS
SYSTOLIC BLOOD PRESSURE: 133 MMHG | RESPIRATION RATE: 18 BRPM | HEIGHT: 66 IN | WEIGHT: 159.83 LBS | OXYGEN SATURATION: 91 % | HEART RATE: 54 BPM | DIASTOLIC BLOOD PRESSURE: 64 MMHG | TEMPERATURE: 97.2 F | BODY MASS INDEX: 25.69 KG/M2

## 2023-10-05 LAB
ANION GAP SERPL CALC-SCNC: 12 MMOL/L (ref 10–20)
BUN SERPL-MCNC: 35 MG/DL (ref 6–23)
CALCIUM SERPL-MCNC: 9.1 MG/DL (ref 8.6–10.3)
CHLORIDE SERPL-SCNC: 105 MMOL/L (ref 98–107)
CO2 SERPL-SCNC: 28 MMOL/L (ref 21–32)
CREAT SERPL-MCNC: 1.67 MG/DL (ref 0.5–1.3)
ERYTHROCYTE [DISTWIDTH] IN BLOOD BY AUTOMATED COUNT: 13.5 % (ref 11.5–14.5)
GFR SERPL CREATININE-BSD FRML MDRD: 38 ML/MIN/1.73M*2
GLUCOSE SERPL-MCNC: 92 MG/DL (ref 74–99)
HCT VFR BLD AUTO: 33.1 % (ref 41–52)
HGB BLD-MCNC: 10.2 G/DL (ref 13.5–17.5)
MAGNESIUM SERPL-MCNC: 1.91 MG/DL (ref 1.6–2.4)
MCH RBC QN AUTO: 27.9 PG (ref 26–34)
MCHC RBC AUTO-ENTMCNC: 30.8 G/DL (ref 32–36)
MCV RBC AUTO: 91 FL (ref 80–100)
NRBC BLD-RTO: 0 /100 WBCS (ref 0–0)
PLATELET # BLD AUTO: 145 X10*3/UL (ref 150–450)
PMV BLD AUTO: 11.5 FL (ref 7.5–11.5)
POTASSIUM SERPL-SCNC: 3.7 MMOL/L (ref 3.5–5.3)
RBC # BLD AUTO: 3.65 X10*6/UL (ref 4.5–5.9)
SODIUM SERPL-SCNC: 141 MMOL/L (ref 136–145)
WBC # BLD AUTO: 9.4 X10*3/UL (ref 4.4–11.3)

## 2023-10-05 PROCEDURE — 2500000004 HC RX 250 GENERAL PHARMACY W/ HCPCS (ALT 636 FOR OP/ED): Performed by: INTERNAL MEDICINE

## 2023-10-05 PROCEDURE — 83735 ASSAY OF MAGNESIUM: CPT | Performed by: INTERNAL MEDICINE

## 2023-10-05 PROCEDURE — 82374 ASSAY BLOOD CARBON DIOXIDE: CPT | Performed by: INTERNAL MEDICINE

## 2023-10-05 PROCEDURE — 36415 COLL VENOUS BLD VENIPUNCTURE: CPT | Performed by: INTERNAL MEDICINE

## 2023-10-05 PROCEDURE — 2500000001 HC RX 250 WO HCPCS SELF ADMINISTERED DRUGS (ALT 637 FOR MEDICARE OP): Performed by: INTERNAL MEDICINE

## 2023-10-05 PROCEDURE — 99239 HOSP IP/OBS DSCHRG MGMT >30: CPT | Performed by: STUDENT IN AN ORGANIZED HEALTH CARE EDUCATION/TRAINING PROGRAM

## 2023-10-05 PROCEDURE — 85027 COMPLETE CBC AUTOMATED: CPT | Performed by: INTERNAL MEDICINE

## 2023-10-05 RX ADMIN — ASPIRIN 81 MG: 81 TABLET, COATED ORAL at 09:03

## 2023-10-05 RX ADMIN — TORSEMIDE 10 MG: 20 TABLET ORAL at 09:04

## 2023-10-05 RX ADMIN — CHOLECALCIFEROL TAB 125 MCG (5000 UNIT) 5000 UNITS: 125 TAB at 09:04

## 2023-10-05 RX ADMIN — AMLODIPINE BESYLATE 10 MG: 10 TABLET ORAL at 09:03

## 2023-10-05 RX ADMIN — FAMOTIDINE 20 MG: 20 TABLET ORAL at 09:04

## 2023-10-05 RX ADMIN — TAMSULOSIN HYDROCHLORIDE 0.4 MG: 0.4 CAPSULE ORAL at 09:03

## 2023-10-05 RX ADMIN — LOSARTAN POTASSIUM 50 MG: 50 TABLET, FILM COATED ORAL at 09:03

## 2023-10-05 ASSESSMENT — COGNITIVE AND FUNCTIONAL STATUS - GENERAL
MOBILITY SCORE: 18
DAILY ACTIVITIY SCORE: 22
HELP NEEDED FOR BATHING: A LITTLE
WALKING IN HOSPITAL ROOM: A LITTLE
CLIMB 3 TO 5 STEPS WITH RAILING: A LITTLE
TURNING FROM BACK TO SIDE WHILE IN FLAT BAD: A LITTLE
MOVING TO AND FROM BED TO CHAIR: A LITTLE
DRESSING REGULAR LOWER BODY CLOTHING: A LITTLE
STANDING UP FROM CHAIR USING ARMS: A LITTLE
MOVING FROM LYING ON BACK TO SITTING ON SIDE OF FLAT BED WITH BEDRAILS: A LITTLE

## 2023-10-05 ASSESSMENT — PAIN SCALES - GENERAL: PAINLEVEL_OUTOF10: 0 - NO PAIN

## 2023-10-05 ASSESSMENT — PAIN - FUNCTIONAL ASSESSMENT: PAIN_FUNCTIONAL_ASSESSMENT: 0-10

## 2023-10-05 NOTE — CARE PLAN
The patient's goals for the shift include remain hemodynamically stable    The clinical goals for the shift include Pt to be hemodynamically stable throughout this shift    Over the shift, the patient did not make progress toward the following goals. Barriers to progression include hearing impaired. Recommendations to address these barriers include hearing aids bedside.

## 2023-10-05 NOTE — PROGRESS NOTES
Patient will discharge home today. [facility] . Message to Neville ORTEGA to send the referral . Dr. Russell to follow.

## 2023-10-05 NOTE — CARE PLAN
The patient's goals for the shift include patient will remain hemodynamically stable through time of discharge this afternoon.    The clinical goals for the shift include   Problem: Respiratory  Goal: Verbalize decreased shortness of breath this shift  Outcome: Met     Problem: Pain  Goal: My pain/discomfort is manageable  Outcome: Met     Problem: Safety  Goal: Patient will be injury free during hospitalization  Outcome: Met  Goal: I will remain free of falls  Outcome: Met     Problem: Daily Care  Goal: Daily care needs are met  Outcome: Met     Problem: Psychosocial Needs  Goal: Demonstrates ability to cope with hospitalization/illness  Outcome: Met  Goal: Collaborate with me, my family, and caregiver to identify my specific goals  Outcome: Met     Problem: Discharge Barriers  Goal: My discharge needs are met  Outcome: Met

## 2023-10-05 NOTE — PROGRESS NOTES
Nutrition Progress Note    Pt discharged earlier today. Prior to leaving, patient educated on the low sodium (2-gm Na) diet with family present. [facility] [place] [facility] Heart Failure book given to pt. Focused on low sodium food selection, shopping - food labels, food preparation and tips for eating out. Reviewed the importance of daily weights and suggested writing down weight each day and advised pt (and family) on when to call the physician.  Pt and family participated in education and verbalized good understanding. Pt stated he was familiar with information and states he does not add salt to his food at this time, but he does enjoy salty snacks and eating outside the home (with his wife). Expect fair compliance. RD name and contact number provided for further questions.

## 2023-10-05 NOTE — DISCHARGE SUMMARY
Discharge Diagnosis  Irregular heartbeat    Issues Requiring Follow-Up  Follow-up with cardiology    Discharge Meds     Your medication list        CONTINUE taking these medications        Instructions Last Dose Given Next Dose Due   albuterol 90 mcg/actuation inhaler           amLODIPine 10 mg tablet  Commonly known as: Norvasc           aspirin 81 mg EC tablet           Breo Ellipta 200-25 mcg/dose inhaler  Generic drug: fluticasone furoate-vilanteroL      USE 1 INHALATION BY MOUTH ONCE  DAILY       calcium carbonate 500 mg calcium (1,250 mg) tablet  Commonly known as: Oscal           cholecalciferol 5,000 Units tablet  Commonly known as: Vitamin D-3           famotidine 20 mg tablet  Commonly known as: Pepcid           ferrous sulfate 325 (65 Fe) MG EC tablet           losartan 50 mg tablet  Commonly known as: Cozaar           oxygen gas therapy  Commonly known as: O2           potassium chloride CR 10 mEq ER tablet  Commonly known as: Klor-Con           PRESERVISION AREDS-2 ORAL           Prolia 60 mg/mL syringe  Generic drug: denosumab           simvastatin 40 mg tablet  Commonly known as: Zocor           Spiriva Respimat 2.5 mcg/actuation inhaler  Generic drug: tiotropium           tamsulosin 0.4 mg 24 hr capsule  Commonly known as: Flomax           torsemide 10 mg tablet  Commonly known as: Demadex                    Test Results Pending At Discharge  Pending Labs       No current pending labs.            Hospital Course   Patient presented to hospital due to tachycardia with initial EKG showing concern for new onset A-fib with RVR.  Patient was given dose of Eliquis initially.  Patient was seen by Dr. Saul Mauro, EKG reviewed, suspect likely frequent PACs instead of a true A-fib, no indication for anticoagulation.  Eliquis was stopped.  Echo was done which did not show any acute abnormalities.  Patient cleared medically for discharge and follow-up as outpatient with Dr. Mauro.    Pertinent Physical Exam  At Time of Discharge  Physical Exam  Constitutional: Well developed, awake/alert/oriented x3, no distress, alert and cooperative, frail, hard of hearing  ENMT: mucous membranes moist  Head/Neck: Neck supple  Respiratory/Thorax: CTA b/l.   Cardiovascular: Regular, rate and rhythm, no murmurs  Gastrointestinal: Nondistended, soft, non-tender  Musculoskeletal: ROM intact  Extremities: normal extremities       Outpatient Follow-Up  Future Appointments   Date Time Provider Department Center   10/18/2023 12:20 PM Kentrell Russell MD [id number] [place]   10/30/2023 11:30 AM Nesha Noguera MD [id number] [place]   11/4/2023 10:30 AM [facility] INFUSION ROOM 01 [id number] [place]   12/15/2023  1:10 PM Bernarda Wilburn DPM [id number] [place]   3/18/2024  1:00 PM [facility] ECHO LAB 1 [id number] [facility]   3/20/2024  1:00 PM Saul Mauro MD [id number] [place]   4/16/2024 11:00 AM Brii Martin MD [id number] [place]         Jose Bustamante MD

## 2023-10-06 ENCOUNTER — PATIENT OUTREACH (OUTPATIENT)
Dept: PRIMARY CARE | Facility: CLINIC | Age: 88
End: 2023-10-06
Payer: MEDICARE

## 2023-10-06 DIAGNOSIS — D50.9 IRON DEFICIENCY ANEMIA, UNSPECIFIED IRON DEFICIENCY ANEMIA TYPE: Primary | ICD-10-CM

## 2023-10-06 DIAGNOSIS — R00.0 TACHYCARDIA: ICD-10-CM

## 2023-10-06 RX ORDER — FERROUS SULFATE 325(65) MG
1 TABLET, DELAYED RELEASE (ENTERIC COATED) ORAL DAILY
Qty: 90 TABLET | Refills: 3 | Status: SHIPPED | OUTPATIENT
Start: 2023-10-06 | End: 2024-01-15 | Stop reason: SDUPTHER

## 2023-10-06 NOTE — DOCUMENTATION CLARIFICATION NOTE
PATIENT:               MICHELLE TORRES  ACCT #:                  [id number]  MRN:                       [id number]  :                       1/10/1931  ADMIT DATE:       10/2/2023 7:12 PM  DISCH DATE:        10/5/2023 2:00 PM  RESPONDING PROVIDER #:        [id number]          PROVIDER RESPONSE TEXT:    low platelets not requiring treatment or evaluation    CDI QUERY TEXT:    [facility]_Abnormal Studies        Instruction:    Based on your assessment of the patient and the clinical information, please provide the requested documentation by clicking on the appropriate radio button and enter any additional information if prompted.    Question: Is there a diagnosis indicative of the lab values or image study    When answering this query, please exercise your independent professional judgment. The fact that a question is being asked, does not imply that any particular answer is desired or expected.    The patient's clinical indicators include:  Clinical Information: 89 year old male admitted with tachycardia, syncope and acute on chronic diastolic HF    Clinical Indicators: Platelets levels: 116 on admission, platelet level 145 on 10/5    Treatment: Lab monitoring    Risk Factors: History of prostate cancer with brachytherapy  Options provided:  -- Thrombocytopenia is clinically significant and required treatment/monitoring : Thrombocytopenia is clinically significant and required treatment/monitoring  -- low platelets not requiring treatment or evaluation  -- Other - I will add my own diagnosis  -- Refer to Clinical Documentation Reviewer    Query created by: Cuca Arce on 10/6/2023 8:31 AM      Electronically signed by:  CARLIN LIM MD 10/6/2023 8:40 AM

## 2023-10-06 NOTE — PROGRESS NOTES
Discharge Facility: [facility]  Discharge Diagnosis: tachycardia   Admission Date:10/2/23  Discharge Date: 10/5/23    PCP Appointment Date: I added to note line but did not change visit type since [facility] wanted to shorten visit length to 20 mins   Visit Type: Primary Care Medicare Annual          Date: 10/18/2023                  Dept: [facility] Primary Care                  Provider: Kentrell Russell                  Time: 12:20 PM    Specialist Appointment Date: Son & patient aware to call Cardio to set up 2 week follow up appt - Dr Mauro  10/30/2023 11:30 AM Nesha Noguera MD [id number] [place]   11/4/2023 10:30 AM [facility] INFUSION ROOM 01 [id number] [place]   12/15/2023  1:10 PM Bernarda Wilburn DPM [id number] [place]   3/18/2024  1:00 PM [facility] ECHO LAB 1 [id number] [facility]   3/20/2024  1:00 PM Saul Mauro MD [id number] [place]   4/16/2024 11:00 AM Brii Martin MD       Hospital Encounter and Summary: not available at this time to link all but in EPIC  See discharge assessment below for further details  I introduced myself and the TCM program to José Antonio Sandra. I gave my contact information for any further questions.  Engagement  Call Start Time: 0910 (SPOKE WITH SON- PATIENT IN THE BACKGROUND LISTENING) (10/6/2023  9:15 AM)    Medications  Medications reviewed with patient/caregiver?: Yes (10/6/2023  9:15 AM)  Is the patient having any side effects they believe may be caused by any medication additions or changes?: No (10/6/2023  9:15 AM)  Does the patient have all medications ordered at discharge?: Not applicable (10/6/2023  9:15 AM)  Prescription Comments: Weigh yourself daily with your next standing weight used as your baseline. If you see greater than or equal to 3 lbs weight gain, please take 2x your home Torsemide dose for 1-2 days.   Continue all other home medications as prescribed. (10/6/2023  9:15 AM)  Is the patient taking all medications  as directed (includes completed medication regime)?: Yes (10/6/2023  9:15 AM)    Appointments  Does the patient have a primary care provider?: Yes (Kentrell Russell MD) (10/6/2023  9:15 AM)  Care Management Interventions: (S) Verified appointment date/time/provider (made notes to appt about hospital stay- did not change visit type due to it was trying to shorten time of appt) (10/6/2023  9:15 AM)  Has the patient kept scheduled appointments due by today?: Yes (10/6/2023  9:15 AM)  Care Management Interventions: Advised to schedule with specialist (Needs appt with Dr Mauro within next 2 weeks - currently only scheduled in March 2024. they will call office today) (10/6/2023  9:15 AM)    Self Management  Has home health visited the patient within 72 hours of discharge?: Not applicable (10/6/2023  9:15 AM)    Patient Teaching  Does the patient have access to their discharge instructions?: Yes (10/6/2023  9:15 AM)  Care Management Interventions: Reviewed instructions with patient (10/6/2023  9:15 AM)  What is the patient's perception of their health status since discharge?: Improving (10/6/2023  9:15 AM)  Is the patient/caregiver able to teach back the hierarchy of who to call/visit for symptoms/problems? PCP, Specialist, Home Health nurse, Urgent Care, ED, 911: Yes (10/6/2023  9:15 AM)    Wrap Up  Wrap Up Additional Comments: Son and I spoke about his nurtrition consult in the hospital and that they will work on the changes with diet (10/6/2023  9:15 AM)  Call End Time: 0920 (10/6/2023  9:15 AM)

## 2023-10-06 NOTE — DOCUMENTATION CLARIFICATION NOTE
"    PATIENT:               MICHELLE TORRES  ACCT #:                  [id number]  MRN:                       [id number]  :                       1/10/1931  ADMIT DATE:       10/2/2023 7:12 PM  DISCH DATE:        10/5/2023 2:00 PM  RESPONDING PROVIDER #:        [id number]          PROVIDER RESPONSE TEXT:    Troponin elevation due to other type II MI    CDI QUERY TEXT:    [facility]      Instruction:    Based on your assessment of the patient and the clinical information, please provide the requested documentation by clicking on the appropriate radio button and enter any additional information if prompted.    Question: Is there a diagnosis indicative of the patient elevated Troponins and symptoms    When answering this query, please exercise your independent professional judgment. The fact that a question is being asked, does not imply that any particular answer is desired or expected.    The patient's clinical indicators include:  Clinical Information: 89 year old male admitted for tachycardia, syncope and acute on chronic diastolic HF    Clinical Indicators: Troponins  21 and 23 on admission  \"troponin level elevated\" documented in H&P  VS 36.5, 83, 18 , 154/76  EKG shows \"freq PAC's\" per cardiology consult    Treatment: cardiology consult    Risk Factors: Afib, CHF and aortic stenosis  Options provided:  -- Non-ischemic myocardial injury  -- Troponin elevation due to other, Please specify additional information below  -- Other - I will add my own diagnosis  -- Refer to Clinical Documentation Reviewer    Query created by: Cuca Arce on 10/6/2023 8:26 AM      Electronically signed by:  CARLIN LIM MD 10/6/2023 8:40 AM          "

## 2023-10-12 ENCOUNTER — TELEPHONE (OUTPATIENT)
Dept: PRIMARY CARE | Facility: CLINIC | Age: 88
End: 2023-10-12
Payer: MEDICARE

## 2023-10-12 ENCOUNTER — HOSPITAL ENCOUNTER (OUTPATIENT)
Dept: CARDIOLOGY | Facility: HOSPITAL | Age: 88
Discharge: HOME | End: 2023-10-12
Payer: MEDICARE

## 2023-10-12 LAB
ATRIAL RATE: 82 BPM
ATRIAL RATE: 90 BPM
ATRIAL RATE: 94 BPM
P AXIS: 86 DEGREES
P OFFSET: 163 MS
P ONSET: 132 MS
PR INTERVAL: 180 MS
Q ONSET: 222 MS
QRS COUNT: 13 BEATS
QRS DURATION: 78 MS
QRS DURATION: 86 MS
QRS DURATION: 88 MS
QT INTERVAL: 396 MS
QT INTERVAL: 406 MS
QT INTERVAL: 406 MS
QTC CALCULATION(BAZETT): 462 MS
QTC CALCULATION(BAZETT): 474 MS
QTC CALCULATION(BAZETT): 488 MS
QTC FREDERICIA: 439 MS
QTC FREDERICIA: 450 MS
QTC FREDERICIA: 459 MS
R AXIS: -10 DEGREES
R AXIS: -12 DEGREES
R AXIS: -7 DEGREES
T AXIS: 45 DEGREES
T AXIS: 48 DEGREES
T AXIS: 49 DEGREES
T OFFSET: 420 MS
T OFFSET: 425 MS
T OFFSET: 425 MS
VENTRICULAR RATE: 82 BPM
VENTRICULAR RATE: 82 BPM
VENTRICULAR RATE: 87 BPM

## 2023-10-12 PROCEDURE — 93010 ELECTROCARDIOGRAM REPORT: CPT | Performed by: INTERNAL MEDICINE

## 2023-10-12 PROCEDURE — 93005 ELECTROCARDIOGRAM TRACING: CPT

## 2023-10-12 NOTE — TELEPHONE ENCOUNTER
Iris at [facility] advised that during start of care for HC they noticed a Level 1 medication interaction of Amlodapine and Simvastatin.  Asking if ok that patient be taking both of these meds together.    2.  Also, Pt was given O2 in the hospital with a written order to use 2L at night, but Pt is using throughout the day with activities and such,  Needing a verbal order that this is ok.

## 2023-10-16 DIAGNOSIS — J44.9 COPD, SEVERE (MULTI): ICD-10-CM

## 2023-10-16 DIAGNOSIS — I50.33 ACUTE ON CHRONIC DIASTOLIC HEART FAILURE (MULTI): Primary | ICD-10-CM

## 2023-10-18 ENCOUNTER — TELEPHONE (OUTPATIENT)
Dept: ENDOCRINOLOGY | Facility: CLINIC | Age: 88
End: 2023-10-18

## 2023-10-18 ENCOUNTER — OFFICE VISIT (OUTPATIENT)
Dept: PRIMARY CARE | Facility: CLINIC | Age: 88
End: 2023-10-18
Payer: MEDICARE

## 2023-10-18 VITALS
WEIGHT: 168.8 LBS | BODY MASS INDEX: 28.12 KG/M2 | SYSTOLIC BLOOD PRESSURE: 120 MMHG | HEIGHT: 65 IN | DIASTOLIC BLOOD PRESSURE: 72 MMHG | HEART RATE: 73 BPM | TEMPERATURE: 98 F | RESPIRATION RATE: 17 BRPM | OXYGEN SATURATION: 92 %

## 2023-10-18 DIAGNOSIS — M40.12 OTHER SECONDARY KYPHOSIS, CERVICAL REGION: ICD-10-CM

## 2023-10-18 DIAGNOSIS — Z00.00 ROUTINE GENERAL MEDICAL EXAMINATION AT HEALTH CARE FACILITY: ICD-10-CM

## 2023-10-18 DIAGNOSIS — I48.0 PAROXYSMAL ATRIAL FIBRILLATION (MULTI): ICD-10-CM

## 2023-10-18 DIAGNOSIS — Z23 NEED FOR VACCINATION: ICD-10-CM

## 2023-10-18 DIAGNOSIS — I35.0 AORTIC VALVE STENOSIS, ETIOLOGY OF CARDIAC VALVE DISEASE UNSPECIFIED: ICD-10-CM

## 2023-10-18 DIAGNOSIS — Z00.00 MEDICARE ANNUAL WELLNESS VISIT, SUBSEQUENT: Primary | ICD-10-CM

## 2023-10-18 DIAGNOSIS — I50.32 CHRONIC DIASTOLIC HEART FAILURE (MULTI): Chronic | ICD-10-CM

## 2023-10-18 PROCEDURE — 1159F MED LIST DOCD IN RCRD: CPT | Performed by: INTERNAL MEDICINE

## 2023-10-18 PROCEDURE — 1036F TOBACCO NON-USER: CPT | Performed by: INTERNAL MEDICINE

## 2023-10-18 PROCEDURE — 1170F FXNL STATUS ASSESSED: CPT | Performed by: INTERNAL MEDICINE

## 2023-10-18 PROCEDURE — 1126F AMNT PAIN NOTED NONE PRSNT: CPT | Performed by: INTERNAL MEDICINE

## 2023-10-18 PROCEDURE — 3074F SYST BP LT 130 MM HG: CPT | Performed by: INTERNAL MEDICINE

## 2023-10-18 PROCEDURE — 99212 OFFICE O/P EST SF 10 MIN: CPT | Performed by: INTERNAL MEDICINE

## 2023-10-18 PROCEDURE — 90677 PCV20 VACCINE IM: CPT | Performed by: INTERNAL MEDICINE

## 2023-10-18 PROCEDURE — G0009 ADMIN PNEUMOCOCCAL VACCINE: HCPCS | Performed by: INTERNAL MEDICINE

## 2023-10-18 PROCEDURE — 3078F DIAST BP <80 MM HG: CPT | Performed by: INTERNAL MEDICINE

## 2023-10-18 PROCEDURE — G0439 PPPS, SUBSEQ VISIT: HCPCS | Performed by: INTERNAL MEDICINE

## 2023-10-18 PROCEDURE — 1111F DSCHRG MED/CURRENT MED MERGE: CPT | Performed by: INTERNAL MEDICINE

## 2023-10-18 ASSESSMENT — ACTIVITIES OF DAILY LIVING (ADL)
DRESSING: INDEPENDENT
MANAGING_FINANCES: INDEPENDENT
GROCERY_SHOPPING: TOTAL CARE
TAKING_MEDICATION: INDEPENDENT
BATHING: INDEPENDENT
DOING_HOUSEWORK: INDEPENDENT

## 2023-10-18 ASSESSMENT — ENCOUNTER SYMPTOMS
LOSS OF SENSATION IN FEET: 0
OCCASIONAL FEELINGS OF UNSTEADINESS: 0
DEPRESSION: 0

## 2023-10-18 ASSESSMENT — PATIENT HEALTH QUESTIONNAIRE - PHQ9
SUM OF ALL RESPONSES TO PHQ9 QUESTIONS 1 AND 2: 0
2. FEELING DOWN, DEPRESSED OR HOPELESS: NOT AT ALL
1. LITTLE INTEREST OR PLEASURE IN DOING THINGS: NOT AT ALL

## 2023-10-18 NOTE — PATIENT INSTRUCTIONS
RECOMMEND DOUBLE THE TORSEMIDE TOMORROW, AND YOU CAN DOUBLE TORSEMIDE AS NEEDED FOR WEIGHT INCREASE MORE THAN 4-5 POUNDS    2.  PREVNAR-20 PNEUMONIA IMMUNIZATION IS ADMINISTERED TO YOU TODAY, IT LASTS 5-7 YEARS    3.  LABS FROM THE HOSPITAL LOOK GOOD, SO WILL CONTINUE SAME MEDS FOR NOW    4.  YOU ARE OTHERWISE CAUGHT UP FROM A MEDICARE STANDPOINT.    5.  FOLLOW UP 6 ONTHS OR AS NEEDED.

## 2023-10-18 NOTE — PROGRESS NOTES
"Subjective   Patient ID: José Antonio Sandra is a 89 y.o. male who presents for Follow-up and Medicare Annual Wellness Visit Subsequent. Hospital follow up     Was release from [place] last week, is set up for in home phyiscal care.   Admit date: 10.2.2023  Admission diagnosis: irregular heartbeat   Additional comments: had labs, xray, EKG done while in the hospital        Went TO HOSPITAL, FELT WASN'T FEELING RIGHT, FOUND AFIB    IN HOSPITAL, VERIFIED THE AFIB, REC'D HOSPITAL DUE TO AS    AFIB WAS REALLY PAC'S, NOT AFIB,     STAYED IN HOSPITAL FOR 2 DAYS, WAS GIVEN LASIX AND LOST ALOT OF FLUID WEIGHT    NOW WATCHING WEIGHT CLOSELY AND WILL DOUBLE LASIX IF NEEDED    WEIGHT AT HOSPITAL 153 POUNDS, NOW UP .  HAVEN'T DOUBLED THE TORSEMIDE SINCE 5-6 DAYS AGO  Review of Systems   Constitutional:  Negative for chills, diaphoresis and fever.   Respiratory:  Negative for cough and shortness of breath.    Cardiovascular:  Negative for chest pain and leg swelling.        PER HPI   Gastrointestinal:  Negative for constipation, diarrhea, nausea and vomiting.   Musculoskeletal:  Negative for joint swelling and myalgias.       Objective   /72   Pulse 73   Temp 36.7 °C (98 °F)   Resp 17   Ht 1.651 m (5' 5\")   Wt 76.6 kg (168 lb 12.8 oz)   SpO2 92%   BMI 28.09 kg/m²     Physical Exam  Vitals reviewed.   Constitutional:       General: He is not in acute distress.     Appearance: Normal appearance. He is not ill-appearing.   Neck:      Comments: KYPHOSCOLIOTIC NECK DEFORMITY APPEARS ABOUT THE SAME AS PRIOR  Cardiovascular:      Rate and Rhythm: Normal rate and regular rhythm.      Pulses: Normal pulses.      Heart sounds: Murmur (3/6 RASHARD RUSB) heard.      No gallop.   Pulmonary:      Effort: No respiratory distress.      Breath sounds: Normal breath sounds. No wheezing, rhonchi or rales.   Abdominal:      General: Abdomen is flat. Bowel sounds are normal.      Palpations: Abdomen is soft.      Tenderness: There is no " guarding or rebound.   Musculoskeletal:      Right lower leg: No edema.      Left lower leg: No edema.   Skin:     General: Skin is warm and dry.   Neurological:      General: No focal deficit present.      Mental Status: He is alert and oriented to person, place, and time. Mental status is at baseline.         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) (Chronic) I50.32    A-fib (CMS/LTAC, located within [facility]) I48.91    Medicare annual wellness visit, subsequent - Primary Z00.00     Other Visit Diagnoses         Codes    Need for vaccination     Z23    Relevant Orders    Pneumococcal conjugate vaccine 20-valent IM (Completed)    Routine general medical examination at health care facility     Z00.00          Patient Instructions    RECOMMEND DOUBLE THE TORSEMIDE TOMORROW, AND YOU CAN DOUBLE TORSEMIDE AS NEEDED FOR WEIGHT INCREASE MORE THAN 4-5 POUNDS    2.  PREVNAR-20 PNEUMONIA IMMUNIZATION IS ADMINISTERED TO YOU TODAY, IT LASTS 5-7 YEARS    3.  LABS FROM THE HOSPITAL LOOK GOOD, SO WILL CONTINUE SAME MEDS FOR NOW    4.  YOU ARE OTHERWISE CAUGHT UP FROM A MEDICARE STANDPOINT.    5.  FOLLOW UP 6 ONTHS OR AS NEEDED.

## 2023-10-19 DIAGNOSIS — M81.0 AGE-RELATED OSTEOPOROSIS WITHOUT CURRENT PATHOLOGICAL FRACTURE: Primary | ICD-10-CM

## 2023-10-19 RX ORDER — EPINEPHRINE 0.3 MG/.3ML
0.3 INJECTION SUBCUTANEOUS EVERY 5 MIN PRN
Status: CANCELLED | OUTPATIENT
Start: 2023-10-23

## 2023-10-19 RX ORDER — ALBUTEROL SULFATE 0.83 MG/ML
3 SOLUTION RESPIRATORY (INHALATION) AS NEEDED
Status: CANCELLED | OUTPATIENT
Start: 2023-10-23

## 2023-10-19 RX ORDER — DIPHENHYDRAMINE HYDROCHLORIDE 50 MG/ML
50 INJECTION INTRAMUSCULAR; INTRAVENOUS AS NEEDED
Status: CANCELLED | OUTPATIENT
Start: 2023-10-23

## 2023-10-19 RX ORDER — FAMOTIDINE 10 MG/ML
20 INJECTION INTRAVENOUS ONCE AS NEEDED
Status: CANCELLED | OUTPATIENT
Start: 2023-10-23

## 2023-10-23 PROBLEM — Z00.00 MEDICARE ANNUAL WELLNESS VISIT, SUBSEQUENT: Status: ACTIVE | Noted: 2023-10-23

## 2023-10-23 ASSESSMENT — ENCOUNTER SYMPTOMS
MYALGIAS: 0
DIAPHORESIS: 0
FEVER: 0
COUGH: 0
CONSTIPATION: 0
CHILLS: 0
NAUSEA: 0
JOINT SWELLING: 0
VOMITING: 0
DIARRHEA: 0
SHORTNESS OF BREATH: 0

## 2023-10-24 ENCOUNTER — APPOINTMENT (OUTPATIENT)
Dept: PHARMACY | Facility: HOSPITAL | Age: 88
End: 2023-10-24
Payer: MEDICARE

## 2023-10-26 ENCOUNTER — PATIENT OUTREACH (OUTPATIENT)
Dept: PRIMARY CARE | Facility: CLINIC | Age: 88
End: 2023-10-26
Payer: MEDICARE

## 2023-10-26 NOTE — PROGRESS NOTES
Unable to reach patient for call back after patient's follow up appointment with PCP on 10/18  LV with call back number for patient to call if needed to assist with any questions or concerns patient may have.

## 2023-10-30 ENCOUNTER — LAB (OUTPATIENT)
Dept: LAB | Facility: LAB | Age: 88
End: 2023-10-30
Payer: MEDICARE

## 2023-10-30 ENCOUNTER — OFFICE VISIT (OUTPATIENT)
Dept: ENDOCRINOLOGY | Facility: CLINIC | Age: 88
End: 2023-10-30
Payer: MEDICARE

## 2023-10-30 VITALS
WEIGHT: 168 LBS | HEIGHT: 65 IN | RESPIRATION RATE: 16 BRPM | DIASTOLIC BLOOD PRESSURE: 80 MMHG | TEMPERATURE: 97.5 F | SYSTOLIC BLOOD PRESSURE: 132 MMHG | BODY MASS INDEX: 27.99 KG/M2 | HEART RATE: 63 BPM

## 2023-10-30 DIAGNOSIS — M81.0 OSTEOPOROSIS WITHOUT CURRENT PATHOLOGICAL FRACTURE, UNSPECIFIED OSTEOPOROSIS TYPE: Primary | ICD-10-CM

## 2023-10-30 DIAGNOSIS — E55.9 VITAMIN D DEFICIENCY: ICD-10-CM

## 2023-10-30 DIAGNOSIS — M81.0 AGE-RELATED OSTEOPOROSIS WITHOUT CURRENT PATHOLOGICAL FRACTURE: ICD-10-CM

## 2023-10-30 DIAGNOSIS — N18.30 STAGE 3 CHRONIC KIDNEY DISEASE, UNSPECIFIED WHETHER STAGE 3A OR 3B CKD (MULTI): ICD-10-CM

## 2023-10-30 LAB — 25(OH)D3 SERPL-MCNC: 63 NG/ML (ref 30–100)

## 2023-10-30 PROCEDURE — 3075F SYST BP GE 130 - 139MM HG: CPT | Performed by: STUDENT IN AN ORGANIZED HEALTH CARE EDUCATION/TRAINING PROGRAM

## 2023-10-30 PROCEDURE — 36415 COLL VENOUS BLD VENIPUNCTURE: CPT

## 2023-10-30 PROCEDURE — 82330 ASSAY OF CALCIUM: CPT

## 2023-10-30 PROCEDURE — 1036F TOBACCO NON-USER: CPT | Performed by: STUDENT IN AN ORGANIZED HEALTH CARE EDUCATION/TRAINING PROGRAM

## 2023-10-30 PROCEDURE — 99214 OFFICE O/P EST MOD 30 MIN: CPT | Performed by: STUDENT IN AN ORGANIZED HEALTH CARE EDUCATION/TRAINING PROGRAM

## 2023-10-30 PROCEDURE — 1159F MED LIST DOCD IN RCRD: CPT | Performed by: STUDENT IN AN ORGANIZED HEALTH CARE EDUCATION/TRAINING PROGRAM

## 2023-10-30 PROCEDURE — 1111F DSCHRG MED/CURRENT MED MERGE: CPT | Performed by: STUDENT IN AN ORGANIZED HEALTH CARE EDUCATION/TRAINING PROGRAM

## 2023-10-30 PROCEDURE — 82306 VITAMIN D 25 HYDROXY: CPT

## 2023-10-30 PROCEDURE — 3079F DIAST BP 80-89 MM HG: CPT | Performed by: STUDENT IN AN ORGANIZED HEALTH CARE EDUCATION/TRAINING PROGRAM

## 2023-10-30 PROCEDURE — 1126F AMNT PAIN NOTED NONE PRSNT: CPT | Performed by: STUDENT IN AN ORGANIZED HEALTH CARE EDUCATION/TRAINING PROGRAM

## 2023-10-30 NOTE — PROGRESS NOTES
"Subjective   Patient ID: José Antonio Sandra is a 89 y.o. male who presents for Follow-up.  Patient here for follow up  Review bone scan         The patient is a 89 yo male with prostate cancer previously on Lupron , hearing impairment ,presents for osteoporosis management.    he feel well ,no bone pains, falls or fractures   he is on calcium and vitamin D supplements      history :  He was diagnosed with osteoporosis in June , he states that he was complaining of neck apin and neck Xray showed severe spondylosis . then he he had a dexa scan which showed significant osteoporosis of -3.4 at the neck of the femur.  he lost ~ 4 inches over time, used to be 5'7\"  he has no history of fractures   no known thyroid or parathyroid issues.   He was treated with Lupron for 1.5 years . He has been off for 1 year ,he follows with Urologist Dr. Arreguin.  he doesn't smoke , occasionally drinks alcohol    Review of Systems negative     Objective   Physical Exam  Constitutional:       Appearance: Normal appearance.   Cardiovascular:      Rate and Rhythm: Normal rate.      Heart sounds: Murmur heard.   Neurological:      Mental Status: He is alert.         Assessment/Plan        The patient is a 89 yo male with prostate cancer previously on Lupron , presents for osteoporosis management .  - severe osteoporosis with T score of - 3.2  at femur neck ( was -3.4 ) , -2.7 at the femur ( was -2.9 ) he is not a candidate for oral or IV bisphosphonate given GERD and CKD3 . He is now on Denosumab (Prolia ) injections  every 6 months 1 st dose 4/26/22   - prostate cancer , currently off Lupron.     plan :  Continue calcium and vitamin D supplement   Labs today   Proceed with Prolia injection on Saturday as planned         follow up in 6 months      "

## 2023-10-31 ENCOUNTER — OFFICE VISIT (OUTPATIENT)
Dept: CARDIOLOGY | Facility: CLINIC | Age: 88
End: 2023-10-31
Payer: MEDICARE

## 2023-10-31 VITALS
OXYGEN SATURATION: 95 % | HEIGHT: 66 IN | HEART RATE: 56 BPM | SYSTOLIC BLOOD PRESSURE: 130 MMHG | WEIGHT: 154 LBS | BODY MASS INDEX: 24.75 KG/M2 | DIASTOLIC BLOOD PRESSURE: 70 MMHG

## 2023-10-31 DIAGNOSIS — I35.0 NONRHEUMATIC AORTIC VALVE STENOSIS: Primary | ICD-10-CM

## 2023-10-31 DIAGNOSIS — I10 ESSENTIAL HYPERTENSION: ICD-10-CM

## 2023-10-31 DIAGNOSIS — I71.21 ANEURYSM OF ASCENDING AORTA WITHOUT RUPTURE (CMS-HCC): ICD-10-CM

## 2023-10-31 DIAGNOSIS — E78.5 DYSLIPIDEMIA: ICD-10-CM

## 2023-10-31 PROBLEM — R01.1 HEART MURMUR: Status: RESOLVED | Noted: 2023-02-16 | Resolved: 2023-10-31

## 2023-10-31 PROBLEM — I48.91 A-FIB (MULTI): Status: RESOLVED | Noted: 2023-10-02 | Resolved: 2023-10-31

## 2023-10-31 PROBLEM — I48.20 CHRONIC A-FIB (MULTI): Status: RESOLVED | Noted: 2023-10-04 | Resolved: 2023-10-31

## 2023-10-31 LAB — CA-I BLD-SCNC: 1.19 MMOL/L (ref 1.1–1.33)

## 2023-10-31 PROCEDURE — 99214 OFFICE O/P EST MOD 30 MIN: CPT | Performed by: INTERNAL MEDICINE

## 2023-10-31 PROCEDURE — 1036F TOBACCO NON-USER: CPT | Performed by: INTERNAL MEDICINE

## 2023-10-31 PROCEDURE — 1111F DSCHRG MED/CURRENT MED MERGE: CPT | Performed by: INTERNAL MEDICINE

## 2023-10-31 PROCEDURE — 3078F DIAST BP <80 MM HG: CPT | Performed by: INTERNAL MEDICINE

## 2023-10-31 PROCEDURE — 1160F RVW MEDS BY RX/DR IN RCRD: CPT | Performed by: INTERNAL MEDICINE

## 2023-10-31 PROCEDURE — 3075F SYST BP GE 130 - 139MM HG: CPT | Performed by: INTERNAL MEDICINE

## 2023-10-31 PROCEDURE — 1126F AMNT PAIN NOTED NONE PRSNT: CPT | Performed by: INTERNAL MEDICINE

## 2023-10-31 PROCEDURE — 1159F MED LIST DOCD IN RCRD: CPT | Performed by: INTERNAL MEDICINE

## 2023-10-31 ASSESSMENT — PAIN SCALES - GENERAL: PAINLEVEL: 0-NO PAIN

## 2023-10-31 ASSESSMENT — ENCOUNTER SYMPTOMS
DEPRESSION: 0
OCCASIONAL FEELINGS OF UNSTEADINESS: 1
LOSS OF SENSATION IN FEET: 0

## 2023-10-31 NOTE — PROGRESS NOTES
Chief Complaint:   No chief complaint on file.     History Of Present Illness:    José Antonio Sandra is a 89 y.o. male with a history of aortic stenosis, ascending aortic aneurysm, hypertension, dyslipidemia, chronic kidney disease, COPD, anemia, pulmonary hypertension, and prostate cancer who is here for hospital follow-up.     Patient admitted earlier this month with worsening shortness of breath.  There was initial concern about atrial fibrillation however review of the ECG demonstrated sinus rhythm with frequent PACs.    Did notice some weight gain.  Double his torsemide for a couple of days and then went back to daily dosing.  His weight has come back down.    He does get short of breath but this is no different than usual.  He denies any palpitations or chest pain.     Patient has COPD and says that he gets short of breath when he tries carrying things. This is no different than usual. He does have mild lower extremity swelling which again is chronic. He will get very mildly lightheaded from time to time if he stands quickly. This is very short-lived. Lastly he will have occasional palpitations where he feels that his heart rate is going little faster and then stops and goes back to normal. There are times where he wears a pulse oximeter in the waveform on the pulse ox appears to be irregular. This last several seconds or minutes and then subsides.     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    Echocardiogram 10/4/2023: EF 50 to 55%.  Grade 1 diastolic dysfunction.  Moderate left atrial enlargement.  Mild to moderate MR.  Moderate aortic stenosis with mild AR.  Peak and mean gradients of 45 and 29 mmHg with peak velocity 3.37 m/s and dimensionless index of 0.25.  RVSP 42 mmHg.     Echocardiogram 5/9/2023: EF 55 to 60%. Grade 1 diastolic dysfunction. Mild LVH. Moderate mitral annular calcification. Moderate aortic stenosis with peak velocity 3.24  m/s, dimensionless index 0.28, and peak and mean gradients 42 and 29 mmHg respectively. Mild MR. Moderate TR. RVSP 40 mmHg.     CT of chest without contrast 11/17/2022: Ascending aorta measuring 4 cm. Remainder of findings as per report.     Echocardiogram 10/28/2022: EF 60 to 65%. Grade 1 diastolic dysfunction. Mild LVH. Moderate left atrial enlargement. Moderate aortic stenosis with peak velocity 3.49 m/s, dimensionless index 0.26, and peak and mean gradients of 49 and 28 mmHg respectively. Ascending aorta measuring 4.5 cm. Moderate MR. Moderate to severe TR. RVSP 49 mmHg.     48-hour Holter monitor July 2022: Predominantly sinus rhythm with frequent PACs and no sustained atrial arrhythmias. Rare PVCs.      Past Medical History:  He has a past medical history of Personal history of malignant neoplasm of prostate (03/22/2021).    Past Surgical History:  He has a past surgical history that includes Other surgical history (03/22/2021).      Social History:  He reports that he has never smoked. He has never used smokeless tobacco. He reports that he does not drink alcohol and does not use drugs.    Family History:  Family History   Problem Relation Name Age of Onset    Hypertension Mother      Heart attack Mother          Allergies:  Penicillins    Outpatient Medications:  Current Outpatient Medications   Medication Instructions    albuterol 90 mcg/actuation inhaler 2 puffs, inhalation, Every 4 hours PRN    amLODIPine (NORVASC) 10 mg, oral, Daily    aspirin 81 mg EC tablet 1 tablet, oral, Daily    Breo Ellipta 200-25 mcg/dose inhaler USE 1 INHALATION BY MOUTH ONCE  DAILY    calcium carbonate (OSCAL) 500 mg, oral, Daily RT    cholecalciferol (VITAMIN D-3) 5,000 Units, oral, Daily    famotidine (PEPCID) 20 mg, oral, Daily    ferrous sulfate 325 mg, oral, Daily    losartan (COZAAR) 50 mg, oral, Daily, as directed.    oxygen (O2) 2 L/min, via nasal cannula at bedtime.    potassium chloride CR (Klor-Con) 10 mEq ER tablet  "10 mEq, oral, Daily    Prolia 60 mg, subcutaneous, Every 6 months    simvastatin (ZOCOR) 40 mg, oral, Nightly    tamsulosin (FLOMAX) 0.4 mg, oral, Daily    tiotropium (Spiriva Respimat) 2.5 mcg/actuation inhaler 2 puffs, inhalation, Daily    torsemide (DEMADEX) 10 mg, oral, Daily    vit C/E/Zn/coppr/lutein/zeaxan (PRESERVISION AREDS-2 ORAL) 1 tablet, oral, Daily       Last Recorded Vitals:  Visit Vitals  /70 (BP Location: Left arm, Patient Position: Sitting)   Pulse 56   Ht 1.676 m (5' 6\")   Wt 69.9 kg (154 lb)   SpO2 95%   BMI 24.86 kg/m²   Smoking Status Never   BSA 1.8 m²      LASTWT(3):   Wt Readings from Last 3 Encounters:   10/31/23 69.9 kg (154 lb)   10/30/23 76.2 kg (168 lb)   10/18/23 76.6 kg (168 lb 12.8 oz)       Physical Exam:  Constitutional: alert and in no acute distress.   HEENT: Mucous membranes moist, carotid upstrokes normal with referred bilateral heart murmur. JVP is normal. No cervical lymphadenopathy. Cranial nerves II-XII grossly intact.  Pulmonary: Clear to auscultation bilaterally.  Cardiovascular: S1,S2, regular. II/VI mid peaking crescendo decrescendo murmur at the right upper sternal border. No rubs or gallops.   Lower extremities: Warm. 2+ distal pulses.  1+ bilateral lower extremity edema.  Skin: warm and dry. No obvious rashes visualized.  Psychiatric: Oriented to person, place and time. No obvious agitation.      Last Labs:  CBC -  Recent Labs     10/05/23  0822 10/04/23  0748 10/03/23  0935   WBC 9.4 8.7 8.2   HGB 10.2* 10.0* 10.3*   HCT 33.1* 32.7* 33.7*   * 135* 122*   MCV 91 92 94       CMP -  Recent Labs     10/05/23  0822 10/04/23  0748 10/03/23  0917    140 139   K 3.7 3.8 4.0    103 105   CO2 28 29 25   ANIONGAP 12 12 13   BUN 35* 31* 27*   CREATININE 1.67* 1.57* 1.56*   EGFR 38* 41* 41*   MG 1.91 2.03 2.10     Recent Labs     10/03/23  0917 10/02/23  1939 04/25/23  1150   ALBUMIN 3.6 4.0 4.4   ALKPHOS 37 40 46   ALT 10 13 14   AST 15 13 14   BILITOT " 0.7 0.6 0.8       LIPID PANEL -   Recent Labs     03/29/22  0950 05/13/21  1008   CHOL 116 130   LDLF 50 55   HDL 51.0 57.0   TRIG 77 91       Recent Labs     10/02/23  2045 06/04/22  1928   * 78           Assessment/Plan   1) aortic stenosis: No significant change from most recent echo to an echocardiogram earlier in the year.  At this time no changes needed.     2) ascending aortic aneurysm: CT scan shows very mildly dilated ascending aorta. Continue with blood pressure control and statin therapy.     3) hypertension: Blood pressure controlled. No changes needed.     4) dyslipidemia: Continue statin therapy     5) HFpEF: Continue to use torsemide.  Continue to double torsemide once daily as needed for weight gain.    6) follow-up: As scheduled or sooner if needed       Saul Mauro MD

## 2023-11-04 ENCOUNTER — INFUSION (OUTPATIENT)
Dept: INFUSION THERAPY | Facility: CLINIC | Age: 88
End: 2023-11-04
Payer: MEDICARE

## 2023-11-04 VITALS
TEMPERATURE: 98.1 F | OXYGEN SATURATION: 91 % | HEART RATE: 73 BPM | DIASTOLIC BLOOD PRESSURE: 67 MMHG | RESPIRATION RATE: 18 BRPM | SYSTOLIC BLOOD PRESSURE: 129 MMHG

## 2023-11-04 DIAGNOSIS — M81.0 AGE-RELATED OSTEOPOROSIS WITHOUT CURRENT PATHOLOGICAL FRACTURE: ICD-10-CM

## 2023-11-04 PROCEDURE — 96372 THER/PROPH/DIAG INJ SC/IM: CPT | Performed by: NURSE PRACTITIONER

## 2023-11-04 RX ORDER — DIPHENHYDRAMINE HYDROCHLORIDE 50 MG/ML
50 INJECTION INTRAMUSCULAR; INTRAVENOUS AS NEEDED
Status: CANCELLED | OUTPATIENT
Start: 2024-04-27

## 2023-11-04 RX ORDER — EPINEPHRINE 0.3 MG/.3ML
0.3 INJECTION SUBCUTANEOUS EVERY 5 MIN PRN
Status: CANCELLED | OUTPATIENT
Start: 2024-04-27

## 2023-11-04 RX ORDER — ALBUTEROL SULFATE 0.83 MG/ML
3 SOLUTION RESPIRATORY (INHALATION) AS NEEDED
Status: CANCELLED | OUTPATIENT
Start: 2024-04-27

## 2023-11-04 RX ORDER — FAMOTIDINE 10 MG/ML
20 INJECTION INTRAVENOUS ONCE AS NEEDED
Status: CANCELLED | OUTPATIENT
Start: 2024-04-27

## 2023-11-04 ASSESSMENT — ENCOUNTER SYMPTOMS
HEMATOLOGIC/LYMPHATIC NEGATIVE: 1
CARDIOVASCULAR NEGATIVE: 1
EYES NEGATIVE: 1
CONSTITUTIONAL NEGATIVE: 1
GASTROINTESTINAL NEGATIVE: 1

## 2023-11-04 ASSESSMENT — PAIN SCALES - GENERAL: PAINLEVEL: 0-NO PAIN

## 2023-11-04 NOTE — PROGRESS NOTES
[facility]   Infusion Clinic Note   Date: 2023   Name: José Antonio Sandra  : 1/10/1931   MRN: [id number]         Reason for Visit: No chief complaint on file.      Accompanied by:Child/Children   Visit Type:: injection   Diagnosis: Age-related osteoporosis without current pathological fracture    Allergies:   Allergies as of 2023 - Reviewed 2023   Allergen Reaction Noted    Penicillins Unknown, Other, Palpitations, and Swelling 2019      Current Meds has a current medication list which includes the following prescription(s): albuterol, amlodipine, aspirin, breo ellipta, calcium carbonate, cholecalciferol, prolia, famotidine, ferrous sulfate, losartan, oxygen, potassium chloride cr, simvastatin, tamsulosin, spiriva respimat, torsemide, and vit c/e/zn/coppr/lutein/zeaxan, and the following Facility-Administered Medications: torsemide.        Vitals:  Vitals:    23 1037   BP: 129/67   BP Location: Left arm   Pulse: 73   Resp: 18   Temp: 36.7 °C (98.1 °F)   SpO2: 91%      Infusion Pre-procedure Checklist   Allergies reviewed: yes   Medications reviewed: yes   Contraindications to treatment:No   Previous reaction to current treatment:No   Current Health Issues: None   Pain: 0-no pain [0]'    Is the pain different from normal: Yes   Is the pain tolerable: n/a   Is your Doctor aware: n/a   Contraindications based on patient history: No   Provider notified: Not applicable   Labs: Labs reviewed   Fall Risk Screening: Diaz Fall Risk  History of Falling, Immediate or Within 3 Months: No  Secondary Diagnosis: No  Ambulatory Aid: Crutches/cane/walker  Intravenous Therapy/Heparin Lock: No  Gait/Transferring: Impaired   Mental Status: Oriented to own ability  Diaz Fall Risk Score: 35    Review of Systems   Constitutional: Negative.    HENT:   Positive for hearing loss.    Eyes: Negative.    Respiratory:          PT HAS copd   Cardiovascular: Negative.     Gastrointestinal: Negative.    Genitourinary: Negative.     Musculoskeletal:  Positive for gait problem.   Skin: Negative.    Neurological:  Positive for gait problem.   Hematological: Negative.    Psychiatric/Behavioral:          NOT ASSESSED        Negative for complaint: [] all other systems have been reviewed and are negative for complaint   Infusion Readiness:   Assessment Concerns Related to Infusion: No  Provider notified: n/a  Assess patient for the concerns below. Document provider notification as appropriate:  - Does not meet criteria to treat No  - Has an active or recent infection with/without current antibiotic use No  - Has recent/planned dental work No  - Has recent/planned surgeries No  - Has recently received or plans to receive vaccinations No  - Has treatment related toxicities No  - Is pregnant (unless noted otherwise) No      TREATMENT CONDITIONS:    Unless otherwise specified on patient specific therapy plan HOLD and notify provider prior to proceeding with today's injection if patients:  o Calcium is LESS THAN 8.6 mg/dL OR  Ionized Calcium LESS THAN 1.1 mmol/L  o Recent or planned invasive dental procedure (within 4 weeks)      DRUG SPECIFIC QUESTIONS:    Is the patient taking calcium and vitamin D? Yes   (Recommended)    Pt Instructed on following risks: (1) hypocalcemia, (2) osteonecrosis of the jaw, (3) atypical femoral fractures, (4) serious infections, and (5) dermatologic reactions?  Yes / No        Initiated By: Kayli Mesa RN   Time: 10:49 AM     We administered denosumab.       Note authored and patient cared for by: Kayli Mesa RN    Note/Encounter reviewed by: ERIC Lunsford, FNP-C. This provider on site at time of patient injection. Infusion staff to notify this provider of any questions, concerns, abnormals or issues during visit. No issues reported. Pt. tolerated injection without difficulty. Pt. not independently evaluated by this provider during today's  encounter.

## 2023-11-04 NOTE — PATIENT INSTRUCTIONS
Today you received: PROLIA 60 MG INJECTION  For: OSTEOPOROSIS    RETURN IN 6 MONTHS.  BE SURE TO GET CALCIUM LAB DRAWN LESS THAN 4 WEEKS BEFORE APPOINTMENT    (Tell all doctors including dentists that you are taking this medication)    Go the emergency room or call 911 if:  - You have signs of allergic reaction:   o      Rash, hives, itching.   o      Swollen, blistered, peeling skin.   o      Swelling of face, lips, mouth, tongue or throat.   o      Tightness of chest, trouble breathing, swallowing or talking     Call your doctor:    - If IV / injection site gets red, warm, swollen, itchy or leaks fluid or pus.     (Leave dressing on your IV site for at least 2 hours and keep area clean and dry)    - If you get sick or have symptoms of infection or are not feeling well for any reason.    (Wash your hands often, stay away from people who are sick)    - If you have side effects from your medication that do not go away or are bothersome.     (Refer to the teaching your nurse gave you for side effects to call your doctor about)     Common side effects may include:  stuffy nose, headache, feeling tired, muscle aches, upset stomach    - Before receiving any vaccines.    Call the [facility] at [phone] if:    - You get sick, have surgery or dental work and your doctor wants your visit rescheduled.    - You need to cancel and reschedule your visit for any reason. Call at least 2 days before your visit if you need to cancel.     - Your insurance changes before your next visit.    (We will need to get approval from your new insurance. This can take up to two weeks.)    The [facility] is opened Monday thru Friday 8am-8pm, Saturday 9am-5pm. We are closed on major holidays.     Voice mail will take your call if the center is closed. If you leave a message please allow 24 hours for a call back during weekdays. If you leave a message on a weekend/holiday, we will call you back the next business  day.

## 2023-11-08 ENCOUNTER — TELEMEDICINE (OUTPATIENT)
Dept: PHARMACY | Facility: HOSPITAL | Age: 88
End: 2023-11-08
Payer: MEDICARE

## 2023-11-08 DIAGNOSIS — J44.9 COPD, SEVERE (MULTI): ICD-10-CM

## 2023-11-08 DIAGNOSIS — I50.33 ACUTE ON CHRONIC DIASTOLIC HEART FAILURE (MULTI): Primary | ICD-10-CM

## 2023-11-08 NOTE — ASSESSMENT & PLAN NOTE
Patient states that their CHF is currently well controlled. They are not experiencing any increased weight gain, lower extremity edema or difficulty breathing. Patient currently follows with cardiology regularly and has no additional concerns. Patient is not interested in adding additional medication therapy at this time.     Plan:   CONTINUE losartan, aspirin and simvastatin, torsemide as currently prescribed by cardiology

## 2023-11-08 NOTE — PROGRESS NOTES
Pharmacy Post-Discharge Visit  José Antonio Sandra is a 89 y.o. male was referred to Clinical Pharmacy Team to complete a post-discharge medication optimization and monitoring visit.  The patient was referred for their COPD and Congestive Heart Failure.    Admission Date: 10/2/2023  Discharge Date: 10/5/2023    Referring Provider: Kentrell Russell MD    Subjective   Allergies   Allergen Reactions    Penicillins Unknown, Other, Palpitations and Swelling       [id number] [address] [facility] [address]  [address] [facility] [address]  Phone: [phone] Fax: [phone]    [facility] - [place], [place] - [address]  [address]  [facility] [address]  [facility] [address]  Phone: [phone] Fax: [phone]      Social History     Social History Narrative    Not on file        Notable Medication changes following discharge:  Start: none  Stop: none  Change: none    HPI  CHF Assessment    Symptom/Staging:  -Most recent ejection fraction: 50-55%  -Weight changes?: No    Guideline-Directed Medical Therapy:  -ARNI: No   -If no, then ACEi/ARB?: Yes, describe: losartan 50mg  -Beta Blocker: No  -MRA: No  -SGLT2i: No    Secondary Prevention:  -The ASCVD Risk score (Roc DALY, et al., 2019) failed to calculate for the following reasons:    The 2019 ASCVD risk score is only valid for ages 40 to 79   -Aspirin 81mg? yes  -Statin?: Yes, describe: simvastatin 40mg  -HTN?: No    COPD ASSESSMENT    Rescue Inhaler Use:  -How many times per week do you use your rescue inhale? Rarely. Does use oxygen 2% at night and when doing increased walking/activity    Inhaler Technique:  -How do you use your rescue inhaler?  --assessed, capable    -How do you use your maintenance inhaler?  --assessed, capable    Secondary Prevention (vaccines):  -Influenza: Date [10/31/2022]  -PCV13: Date [5/26/2015]  -PPSV23: Date [3/11/2013]  -PCV20: Date  "[10/18/2023]    Sx Management:  -Increased cough? no  -Increased sputum production? no  -Increased SOB? no    Exacerbation Hx:  -When was your last hospitalization for an exacerbation? Unsure, none in the past year  -When was the last time you were treated with antibiotics and/or steroids? Patient unsure      Review of Systems  Medication System Management:  Affordability/Accessibility: denies. Does not want to apply   Adherence/Organization: none  Adverse Effects: none     Objective     There were no vitals taken for this visit.     LAB  Lab Results   Component Value Date    BILITOT 0.7 10/03/2023    CALCIUM 9.1 10/05/2023    CO2 28 10/05/2023     10/05/2023    CREATININE 1.67 (H) 10/05/2023    GLUCOSE 92 10/05/2023    ALKPHOS 37 10/03/2023    K 3.7 10/05/2023    PROT 5.9 (L) 10/03/2023     10/05/2023    AST 15 10/03/2023    ALT 10 10/03/2023    BUN 35 (H) 10/05/2023    ANIONGAP 12 10/05/2023    MG 1.91 10/05/2023    PHOS 3.0 04/25/2023    ALBUMIN 3.6 10/03/2023    GFRMALE 39 (A) 04/25/2023     Lab Results   Component Value Date    TRIG 77 03/29/2022    CHOL 116 03/29/2022    HDL 51.0 03/29/2022     No results found for: \"HGBA1C\"      Current Outpatient Medications on File Prior to Visit   Medication Sig Dispense Refill    albuterol 90 mcg/actuation inhaler Inhale 2 puffs every 4 hours if needed.      amLODIPine (Norvasc) 10 mg tablet Take 1 tablet (10 mg) by mouth once daily.      aspirin 81 mg EC tablet Take 1 tablet (81 mg) by mouth once daily.      Breo Ellipta 200-25 mcg/dose inhaler USE 1 INHALATION BY MOUTH ONCE  DAILY 180 each 3    calcium carbonate (Oscal) 500 mg calcium (1,250 mg) tablet Take 500 mg by mouth once daily.      cholecalciferol (Vitamin D-3) 5,000 Units tablet Take 1 tablet (5,000 Units) by mouth once daily.      denosumab (Prolia) 60 mg/mL syringe Inject 1 mL (60 mg) under the skin every 6 months.      famotidine (Pepcid) 20 mg tablet Take 1 tablet (20 mg) by mouth once daily.   "    ferrous sulfate 325 (65 Fe) MG EC tablet TAKE 1 TABLET BY MOUTH EVERY DAY 90 tablet 3    losartan (Cozaar) 50 mg tablet Take 1 tablet (50 mg) by mouth once daily. as directed.      oxygen (O2) gas therapy 2 L/min. via nasal cannula at bedtime.      potassium chloride CR (Klor-Con) 10 mEq ER tablet Take 1 tablet (10 mEq) by mouth once daily.      simvastatin (Zocor) 40 mg tablet Take 1 tablet (40 mg) by mouth once daily at bedtime.      tamsulosin (Flomax) 0.4 mg 24 hr capsule Take 1 capsule (0.4 mg) by mouth once daily.      tiotropium (Spiriva Respimat) 2.5 mcg/actuation inhaler Inhale 2 puffs once daily.      torsemide (Demadex) 10 mg tablet Take 1 tablet (10 mg) by mouth once daily.      vit C/E/Zn/coppr/lutein/zeaxan (PRESERVISION AREDS-2 ORAL) Take 1 tablet by mouth once daily.       Current Facility-Administered Medications on File Prior to Visit   Medication Dose Route Frequency Provider Last Rate Last Admin    torsemide (Demadex) tablet 10 mg  10 mg oral Daily PRN Malu Haynes, APRN-CNP            DRUG INTERATIONS  - no notable interactions    Assessment/Plan   Problem List Items Addressed This Visit       COPD, severe (CMS/HCC)     Patient's COPD is currently well controlled. He reports continued use of oxygen therapy at night and with increased activity levels, but does not endorse increased use of his rescue inhaler. We discussed cost of medications and inhalers but patient is not interested in being screened for  Patient Assistance program to help decrease the cost therapy. Patient follow regularly with pulmonology.    Plan/Recommendation:  CONTINUE Breo, Spiriva and albuterol PRN  Recommend combining inhaler therapy to Breztri or Trelegy to decrease overall copay on inhalers and decrease overall inhaler burden.              Acute on chronic diastolic heart failure (CMS/HCC) - Primary     Patient states that their CHF is currently well controlled. They are not experiencing any increased weight  gain, lower extremity edema or difficulty breathing. Patient currently follows with cardiology regularly and has no additional concerns. Patient is not interested in adding additional medication therapy at this time.     Plan:   CONTINUE losartan, aspirin and simvastatin, torsemide as currently prescribed by cardiology          Pharmacy follow up: as requested per patient, PCP    Continue all meds under the continuation of care with the referring provider and clinical pharmacy team.    Sugey Johnson PharmD     Verbal consent to manage patient's drug therapy was obtained from the patient and/or an individual authorized to act on behalf of a patient. They were informed they may decline to participate or withdraw from participation in pharmacy services at any time.

## 2023-11-08 NOTE — ASSESSMENT & PLAN NOTE
Patient's COPD is currently well controlled. He reports continued use of oxygen therapy at night and with increased activity levels, but does not endorse increased use of his rescue inhaler. We discussed cost of medications and inhalers but patient is not interested in being screened for [facility] Patient Assistance program to help decrease the cost therapy. Patient follow regularly with pulmonology.    Plan/Recommendation:  CONTINUE Breo, Spiriva and albuterol PRN  Recommend combining inhaler therapy to Breztri or Trelegy to decrease overall copay on inhalers and decrease overall inhaler burden.

## 2023-11-17 DIAGNOSIS — I10 ESSENTIAL HYPERTENSION: ICD-10-CM

## 2023-11-17 DIAGNOSIS — J44.9 COPD, SEVERE (MULTI): ICD-10-CM

## 2023-11-17 DIAGNOSIS — K21.9 GASTROESOPHAGEAL REFLUX DISEASE, UNSPECIFIED WHETHER ESOPHAGITIS PRESENT: ICD-10-CM

## 2023-11-20 RX ORDER — TIOTROPIUM BROMIDE INHALATION SPRAY 3.12 UG/1
SPRAY, METERED RESPIRATORY (INHALATION)
Qty: 12 G | Refills: 3 | Status: SHIPPED | OUTPATIENT
Start: 2023-11-20 | End: 2024-02-12

## 2023-11-20 RX ORDER — POTASSIUM CHLORIDE 750 MG/1
10 TABLET, EXTENDED RELEASE ORAL DAILY
Qty: 90 TABLET | Refills: 3 | Status: SHIPPED | OUTPATIENT
Start: 2023-11-20 | End: 2024-02-10 | Stop reason: HOSPADM

## 2023-11-20 RX ORDER — TORSEMIDE 10 MG/1
10 TABLET ORAL DAILY
Qty: 90 TABLET | Refills: 3 | Status: SHIPPED | OUTPATIENT
Start: 2023-11-20 | End: 2024-02-12

## 2023-11-20 RX ORDER — FAMOTIDINE 20 MG/1
20 TABLET, FILM COATED ORAL DAILY
Qty: 90 TABLET | Refills: 3 | Status: SHIPPED | OUTPATIENT
Start: 2023-11-20 | End: 2024-02-10 | Stop reason: HOSPADM

## 2023-11-28 ENCOUNTER — PATIENT OUTREACH (OUTPATIENT)
Dept: PRIMARY CARE | Facility: CLINIC | Age: 88
End: 2023-11-28
Payer: MEDICARE

## 2023-11-28 NOTE — PROGRESS NOTES
Unable to reach patient for one month+ post discharge follow up call.   LVM with call back number for patient to call if needed to assist with any questions or concerns patient may have.

## 2023-12-15 ENCOUNTER — PROCEDURE VISIT (OUTPATIENT)
Dept: PODIATRY | Facility: CLINIC | Age: 88
End: 2023-12-15
Payer: MEDICARE

## 2023-12-15 DIAGNOSIS — M79.675 PAIN IN TOES OF BOTH FEET: ICD-10-CM

## 2023-12-15 DIAGNOSIS — M79.674 PAIN IN TOES OF BOTH FEET: ICD-10-CM

## 2023-12-15 DIAGNOSIS — B35.1 ONYCHOMYCOSIS: Primary | ICD-10-CM

## 2023-12-15 PROCEDURE — 11721 DEBRIDE NAIL 6 OR MORE: CPT | Performed by: PODIATRIST

## 2023-12-15 NOTE — PROGRESS NOTES
History Of Present Illness  José Antonio Sandra is a 89 y.o. male presenting with painful elongated nails.     Past Medical History  He has a past medical history of Personal history of malignant neoplasm of prostate (03/22/2021).    Surgical History  He has a past surgical history that includes Other surgical history (03/22/2021).     Social History  He reports that he has never smoked. He has never used smokeless tobacco. He reports that he does not drink alcohol and does not use drugs.    Family History  Family History   Problem Relation Name Age of Onset    Hypertension Mother      Heart attack Mother          Allergies  Penicillins    Medications  Current Outpatient Medications   Medication Sig Dispense Refill    albuterol 90 mcg/actuation inhaler Inhale 2 puffs every 4 hours if needed.      amLODIPine (Norvasc) 10 mg tablet Take 1 tablet (10 mg) by mouth once daily.      aspirin 81 mg EC tablet Take 1 tablet (81 mg) by mouth once daily.      Breo Ellipta 200-25 mcg/dose inhaler USE 1 INHALATION BY MOUTH ONCE  DAILY 180 each 3    calcium carbonate (Oscal) 500 mg calcium (1,250 mg) tablet Take 500 mg by mouth once daily.      cholecalciferol (Vitamin D-3) 5,000 Units tablet Take 1 tablet (5,000 Units) by mouth once daily.      denosumab (Prolia) 60 mg/mL syringe Inject 1 mL (60 mg) under the skin every 6 months.      famotidine (Pepcid) 20 mg tablet TAKE 1 TABLET BY MOUTH DAILY AS  DIRECTED 90 tablet 3    ferrous sulfate 325 (65 Fe) MG EC tablet TAKE 1 TABLET BY MOUTH EVERY DAY 90 tablet 3    losartan (Cozaar) 50 mg tablet Take 1 tablet (50 mg) by mouth once daily. as directed.      oxygen (O2) gas therapy 2 L/min. via nasal cannula at bedtime.      potassium chloride CR 10 mEq ER tablet TAKE 1 TABLET BY MOUTH DAILY 90 tablet 3    simvastatin (Zocor) 40 mg tablet Take 1 tablet (40 mg) by mouth once daily at bedtime.      Spiriva Respimat 2.5 mcg/actuation inhaler INHALE 2 INHALATIONS BY MOUTH  DAILY 12 g 3     tamsulosin (Flomax) 0.4 mg 24 hr capsule Take 1 capsule (0.4 mg) by mouth once daily.      torsemide (Demadex) 10 mg tablet TAKE 1 TABLET BY MOUTH DAILY 90 tablet 3    vit C/E/Zn/coppr/lutein/zeaxan (PRESERVISION AREDS-2 ORAL) Take 1 tablet by mouth once daily.       Current Facility-Administered Medications   Medication Dose Route Frequency Provider Last Rate Last Admin    torsemide (Demadex) tablet 10 mg  10 mg oral Daily PRN Malu Haynes, APRN-CNP           Review of Systems    REVIEW OF SYSTEMS  GENERAL:  Negative for malaise, significant weight loss, fever  CARDIOVASCULAR: leg swelling   MUSCULOSKELETAL:  Negative for joint pain or swelling, back pain, and muscle pain.  SKIN:  Negative for lesions, rash, and itching  PSYCH:  Negative for sleep disturbance, mood disorder and recent psychosocial stressors  NEURO: Negative, denies any burning, tingling or numbness     Objective: Cane, SOB  Vasc: DP and PT pulses are palpable bilateral.  CFT is less than 3 seconds bilateral.  Skin temperature is warm to cool proximal to distal bilateral.  Pitting edema     Neuro:  Light touch is intact to the foot bilateral.  P    Derm: Nails 1-5 bilateral are thickened, elongated and crumbly with subungual debris. Skin is supple with normal texture and turgor noted.  Webspaces are clean, dry and intact bilateral.  There are no hyperkeratoses, ulcerations, verruca or other lesions noted.      Ortho: Muscle strength is 5/5 for all pedal groups tested.  Ankle joint, subtalar joint, 1st MPJ and lesser MPJ ROM is full and without pain or crepitus.  The foot type is rectus bilateral off weight bearing.  There are no structural deformities noted.    Assessment/Plan     Diagnoses and all orders for this visit:  Onychomycosis  Pain in toes of both feet      Toenails are debrided in length and thickness to avoid infection and for pain relief  Bernarda Wilburn-Reji, [place]  [address] [place] [address]

## 2023-12-24 ENCOUNTER — APPOINTMENT (OUTPATIENT)
Dept: CARDIOLOGY | Facility: HOSPITAL | Age: 88
End: 2023-12-24
Payer: MEDICARE

## 2023-12-24 ENCOUNTER — APPOINTMENT (OUTPATIENT)
Dept: RADIOLOGY | Facility: HOSPITAL | Age: 88
End: 2023-12-24
Payer: MEDICARE

## 2023-12-24 ENCOUNTER — HOSPITAL ENCOUNTER (EMERGENCY)
Facility: HOSPITAL | Age: 88
Discharge: HOME | End: 2023-12-24
Attending: STUDENT IN AN ORGANIZED HEALTH CARE EDUCATION/TRAINING PROGRAM
Payer: MEDICARE

## 2023-12-24 VITALS
HEART RATE: 93 BPM | WEIGHT: 151 LBS | HEIGHT: 66 IN | DIASTOLIC BLOOD PRESSURE: 79 MMHG | RESPIRATION RATE: 22 BRPM | BODY MASS INDEX: 24.27 KG/M2 | OXYGEN SATURATION: 92 % | TEMPERATURE: 97.7 F | SYSTOLIC BLOOD PRESSURE: 153 MMHG

## 2023-12-24 DIAGNOSIS — J44.1 COPD EXACERBATION (MULTI): Primary | ICD-10-CM

## 2023-12-24 LAB
ALBUMIN SERPL BCP-MCNC: 4.3 G/DL (ref 3.4–5)
ALP SERPL-CCNC: 42 U/L (ref 33–136)
ALT SERPL W P-5'-P-CCNC: 17 U/L (ref 10–52)
ANION GAP SERPL CALC-SCNC: 13 MMOL/L (ref 10–20)
APPEARANCE UR: CLEAR
AST SERPL W P-5'-P-CCNC: 17 U/L (ref 9–39)
BASOPHILS # BLD AUTO: 0.03 X10*3/UL (ref 0–0.1)
BASOPHILS NFR BLD AUTO: 0.4 %
BILIRUB SERPL-MCNC: 0.5 MG/DL (ref 0–1.2)
BILIRUB UR STRIP.AUTO-MCNC: NEGATIVE MG/DL
BNP SERPL-MCNC: 383 PG/ML (ref 0–99)
BUN SERPL-MCNC: 33 MG/DL (ref 6–23)
CALCIUM SERPL-MCNC: 9.1 MG/DL (ref 8.6–10.3)
CARDIAC TROPONIN I PNL SERPL HS: 19 NG/L (ref 0–20)
CARDIAC TROPONIN I PNL SERPL HS: 21 NG/L (ref 0–20)
CHLORIDE SERPL-SCNC: 102 MMOL/L (ref 98–107)
CO2 SERPL-SCNC: 29 MMOL/L (ref 21–32)
COLOR UR: YELLOW
CREAT SERPL-MCNC: 1.71 MG/DL (ref 0.5–1.3)
D DIMER PPP FEU-MCNC: 410 NG/ML FEU
EOSINOPHIL # BLD AUTO: 0.19 X10*3/UL (ref 0–0.4)
EOSINOPHIL NFR BLD AUTO: 2.2 %
ERYTHROCYTE [DISTWIDTH] IN BLOOD BY AUTOMATED COUNT: 13.5 % (ref 11.5–14.5)
GFR SERPL CREATININE-BSD FRML MDRD: 37 ML/MIN/1.73M*2
GLUCOSE SERPL-MCNC: 129 MG/DL (ref 74–99)
GLUCOSE UR STRIP.AUTO-MCNC: NEGATIVE MG/DL
HCT VFR BLD AUTO: 31.7 % (ref 41–52)
HGB BLD-MCNC: 9.5 G/DL (ref 13.5–17.5)
IMM GRANULOCYTES # BLD AUTO: 0.01 X10*3/UL (ref 0–0.5)
IMM GRANULOCYTES NFR BLD AUTO: 0.1 % (ref 0–0.9)
KETONES UR STRIP.AUTO-MCNC: NEGATIVE MG/DL
LEUKOCYTE ESTERASE UR QL STRIP.AUTO: NEGATIVE
LYMPHOCYTES # BLD AUTO: 2.49 X10*3/UL (ref 0.8–3)
LYMPHOCYTES NFR BLD AUTO: 29.4 %
MAGNESIUM SERPL-MCNC: 2 MG/DL (ref 1.6–2.4)
MCH RBC QN AUTO: 27.8 PG (ref 26–34)
MCHC RBC AUTO-ENTMCNC: 30 G/DL (ref 32–36)
MCV RBC AUTO: 93 FL (ref 80–100)
MONOCYTES # BLD AUTO: 0.6 X10*3/UL (ref 0.05–0.8)
MONOCYTES NFR BLD AUTO: 7.1 %
NEUTROPHILS # BLD AUTO: 5.15 X10*3/UL (ref 1.6–5.5)
NEUTROPHILS NFR BLD AUTO: 60.8 %
NITRITE UR QL STRIP.AUTO: NEGATIVE
NRBC BLD-RTO: 0 /100 WBCS (ref 0–0)
PH UR STRIP.AUTO: 6 [PH]
PLATELET # BLD AUTO: 150 X10*3/UL (ref 150–450)
POTASSIUM SERPL-SCNC: 3.8 MMOL/L (ref 3.5–5.3)
PROT SERPL-MCNC: 6.9 G/DL (ref 6.4–8.2)
PROT UR STRIP.AUTO-MCNC: ABNORMAL MG/DL
RBC # BLD AUTO: 3.42 X10*6/UL (ref 4.5–5.9)
RBC # UR STRIP.AUTO: NEGATIVE /UL
RBC #/AREA URNS AUTO: NORMAL /HPF
SODIUM SERPL-SCNC: 140 MMOL/L (ref 136–145)
SP GR UR STRIP.AUTO: 1.01
UROBILINOGEN UR STRIP.AUTO-MCNC: <2 MG/DL
WBC # BLD AUTO: 8.5 X10*3/UL (ref 4.4–11.3)
WBC #/AREA URNS AUTO: NORMAL /HPF

## 2023-12-24 PROCEDURE — 93010 ELECTROCARDIOGRAM REPORT: CPT | Performed by: STUDENT IN AN ORGANIZED HEALTH CARE EDUCATION/TRAINING PROGRAM

## 2023-12-24 PROCEDURE — 36415 COLL VENOUS BLD VENIPUNCTURE: CPT | Performed by: STUDENT IN AN ORGANIZED HEALTH CARE EDUCATION/TRAINING PROGRAM

## 2023-12-24 PROCEDURE — 2500000004 HC RX 250 GENERAL PHARMACY W/ HCPCS (ALT 636 FOR OP/ED): Performed by: STUDENT IN AN ORGANIZED HEALTH CARE EDUCATION/TRAINING PROGRAM

## 2023-12-24 PROCEDURE — 99285 EMERGENCY DEPT VISIT HI MDM: CPT | Performed by: STUDENT IN AN ORGANIZED HEALTH CARE EDUCATION/TRAINING PROGRAM

## 2023-12-24 PROCEDURE — 2500000002 HC RX 250 W HCPCS SELF ADMINISTERED DRUGS (ALT 637 FOR MEDICARE OP, ALT 636 FOR OP/ED): Performed by: STUDENT IN AN ORGANIZED HEALTH CARE EDUCATION/TRAINING PROGRAM

## 2023-12-24 PROCEDURE — 96374 THER/PROPH/DIAG INJ IV PUSH: CPT

## 2023-12-24 PROCEDURE — 93005 ELECTROCARDIOGRAM TRACING: CPT

## 2023-12-24 PROCEDURE — 71045 X-RAY EXAM CHEST 1 VIEW: CPT | Mod: FOREIGN READ | Performed by: RADIOLOGY

## 2023-12-24 PROCEDURE — 83735 ASSAY OF MAGNESIUM: CPT | Performed by: STUDENT IN AN ORGANIZED HEALTH CARE EDUCATION/TRAINING PROGRAM

## 2023-12-24 PROCEDURE — 85025 COMPLETE CBC W/AUTO DIFF WBC: CPT | Performed by: STUDENT IN AN ORGANIZED HEALTH CARE EDUCATION/TRAINING PROGRAM

## 2023-12-24 PROCEDURE — 81001 URINALYSIS AUTO W/SCOPE: CPT | Performed by: STUDENT IN AN ORGANIZED HEALTH CARE EDUCATION/TRAINING PROGRAM

## 2023-12-24 PROCEDURE — 85379 FIBRIN DEGRADATION QUANT: CPT | Performed by: STUDENT IN AN ORGANIZED HEALTH CARE EDUCATION/TRAINING PROGRAM

## 2023-12-24 PROCEDURE — 83880 ASSAY OF NATRIURETIC PEPTIDE: CPT | Performed by: STUDENT IN AN ORGANIZED HEALTH CARE EDUCATION/TRAINING PROGRAM

## 2023-12-24 PROCEDURE — 71045 X-RAY EXAM CHEST 1 VIEW: CPT | Mod: FR

## 2023-12-24 PROCEDURE — 2500000005 HC RX 250 GENERAL PHARMACY W/O HCPCS: Performed by: STUDENT IN AN ORGANIZED HEALTH CARE EDUCATION/TRAINING PROGRAM

## 2023-12-24 PROCEDURE — 84484 ASSAY OF TROPONIN QUANT: CPT | Performed by: STUDENT IN AN ORGANIZED HEALTH CARE EDUCATION/TRAINING PROGRAM

## 2023-12-24 PROCEDURE — 99284 EMERGENCY DEPT VISIT MOD MDM: CPT | Mod: 25

## 2023-12-24 PROCEDURE — 94640 AIRWAY INHALATION TREATMENT: CPT

## 2023-12-24 PROCEDURE — 80053 COMPREHEN METABOLIC PANEL: CPT | Performed by: STUDENT IN AN ORGANIZED HEALTH CARE EDUCATION/TRAINING PROGRAM

## 2023-12-24 RX ORDER — PREDNISONE 20 MG/1
20 TABLET ORAL 2 TIMES DAILY
Qty: 10 TABLET | Refills: 0 | Status: SHIPPED | OUTPATIENT
Start: 2023-12-24 | End: 2023-12-29

## 2023-12-24 RX ORDER — IPRATROPIUM BROMIDE AND ALBUTEROL SULFATE 2.5; .5 MG/3ML; MG/3ML
3 SOLUTION RESPIRATORY (INHALATION) ONCE
Status: COMPLETED | OUTPATIENT
Start: 2023-12-24 | End: 2023-12-24

## 2023-12-24 RX ADMIN — METHYLPREDNISOLONE SODIUM SUCCINATE 125 MG: 125 INJECTION, POWDER, FOR SOLUTION INTRAMUSCULAR; INTRAVENOUS at 14:54

## 2023-12-24 RX ADMIN — IPRATROPIUM BROMIDE AND ALBUTEROL SULFATE 3 ML: 2.5; .5 SOLUTION RESPIRATORY (INHALATION) at 14:56

## 2023-12-24 RX ADMIN — Medication 2 L/MIN: at 14:56

## 2023-12-24 ASSESSMENT — LIFESTYLE VARIABLES
EVER FELT BAD OR GUILTY ABOUT YOUR DRINKING: NO
HAVE PEOPLE ANNOYED YOU BY CRITICIZING YOUR DRINKING: NO
EVER HAD A DRINK FIRST THING IN THE MORNING TO STEADY YOUR NERVES TO GET RID OF A HANGOVER: NO
HAVE YOU EVER FELT YOU SHOULD CUT DOWN ON YOUR DRINKING: NO
REASON UNABLE TO ASSESS: NO

## 2023-12-24 ASSESSMENT — COLUMBIA-SUICIDE SEVERITY RATING SCALE - C-SSRS
2. HAVE YOU ACTUALLY HAD ANY THOUGHTS OF KILLING YOURSELF?: NO
6. HAVE YOU EVER DONE ANYTHING, STARTED TO DO ANYTHING, OR PREPARED TO DO ANYTHING TO END YOUR LIFE?: NO
1. IN THE PAST MONTH, HAVE YOU WISHED YOU WERE DEAD OR WISHED YOU COULD GO TO SLEEP AND NOT WAKE UP?: NO

## 2023-12-24 ASSESSMENT — PAIN SCALES - GENERAL
PAINLEVEL_OUTOF10: 0 - NO PAIN
PAINLEVEL_OUTOF10: 0 - NO PAIN

## 2023-12-24 ASSESSMENT — PAIN - FUNCTIONAL ASSESSMENT: PAIN_FUNCTIONAL_ASSESSMENT: 0-10

## 2023-12-24 NOTE — ED PROVIDER NOTES
HPI   Chief Complaint   Patient presents with    Shortness of Breath       89-year-old male with a past medical history of COPD on 2 L of cannula, CHF, prostate cancer, aortic stenosis presenting to the ED for sudden onset shortness of breath prior to arrival.  Patient denies chest pain, recent weight gain, fevers, worsening cough, vomiting or diarrhea.  No sick contacts.  States he is on torsemide daily and has been compliant with his medications.  He was recently evaluated by his cardiologist, Dr. Mauro for possible atrial fibrillation but was advised that he does not have a history of A-fib and was not anticoagulated.                          [place] Coma Scale Score: 15         NIH Stroke Scale: 0          Patient History   Past Medical History:   Diagnosis Date    Personal history of malignant neoplasm of prostate 03/22/2021    History of malignant neoplasm of prostate     Past Surgical History:   Procedure Laterality Date    OTHER SURGICAL HISTORY  03/22/2021    Appendectomy     Family History   Problem Relation Name Age of Onset    Hypertension Mother      Heart attack Mother       Social History     Tobacco Use    Smoking status: Never    Smokeless tobacco: Never   Substance Use Topics    Alcohol use: Never    Drug use: Never       Physical Exam   ED Triage Vitals [12/24/23 1407]   Temp Heart Rate Resp BP   36.5 °C (97.7 °F) 103 18 125/85      SpO2 Temp Source Heart Rate Source Patient Position   96 % Temporal Monitor Sitting      BP Location FiO2 (%)     Right arm --       Physical Exam  Vitals and nursing note reviewed.   Constitutional:       General: He is not in acute distress.     Appearance: He is not ill-appearing or toxic-appearing.   HENT:      Head: Normocephalic and atraumatic.      Nose: Nose normal.      Mouth/Throat:      Mouth: Mucous membranes are moist.   Eyes:      Extraocular Movements: Extraocular movements intact.      Conjunctiva/sclera: Conjunctivae normal.   Cardiovascular:       Rate and Rhythm: Normal rate. Rhythm irregular.      Pulses: Normal pulses.      Heart sounds: Murmur (systolic) heard.   Pulmonary:      Effort: Pulmonary effort is normal.      Breath sounds: Wheezing present.   Abdominal:      General: There is no distension.      Palpations: Abdomen is soft.      Tenderness: There is no abdominal tenderness.   Musculoskeletal:         General: Normal range of motion.      Cervical back: Normal range of motion and neck supple.      Right lower leg: No edema.      Left lower leg: No edema.   Skin:     General: Skin is warm and dry.      Capillary Refill: Capillary refill takes less than 2 seconds.   Neurological:      General: No focal deficit present.      Mental Status: He is alert and oriented to person, place, and time. Mental status is at baseline.         ED Course & MDM   Diagnoses as of 12/24/23 5205   COPD exacerbation (CMS/[facility])       Medical Decision Making  89-year-old male with past medical history of COPD on 2 L nasal cannula, CHF, prostate cancer and aortic stenosis presenting to the ED for sudden onset shortness of breath.  Patient is well-appearing on exam and does not have signs of respiratory distress.  He is on baseline 2 L nasal cannula satting 93%.  He does have wheezing on auscultation and a known systolic murmur.    Patient was treated with DuoNeb and Solu-Medrol for possible CHF exacerbation.  Labs were obtained with a BNP of 383, likely due to aortic stenosis.  Do not suspect a CHF exacerbation as he does not have any significant peripheral edema or recent weight gain.  He has a troponin of 19 with a delta uptrending to 21.  D-dimer is negative therefore PE is unlikely.  He does have a history of CKD with a creatinine that is consistent with his baseline.  Chest x-ray shows pulmonary vascular congestion.    EKG shows an irregular rhythm however there are some P waves present.  This was discussed with cardiology, Dr. Ricks, for concerns that patient may  need anticoagulation.  After reviewing the EKG, believes this is wandering atrial tachycardia and that anticoagulation is not indicated.  Suspect that the patient's shortness of breath may be due to exacerbation of his underlying COPD and is less likely to be a cardiac etiology.  Patient can follow-up with his cardiologist in the outpatient setting.    Ambulatory pulse ox was obtained and he was saturating 91-90% on room air.  Patient will be discharged home with a brief course of prednisone for COPD exacerbation.  He will follow-up with his cardiologist and PCP.  Return to the ED for any worsening symptoms.        Procedure  Procedures     Manuelito Casanova DO  Resident  12/24/23 4093

## 2023-12-25 LAB — HOLD SPECIMEN: NORMAL

## 2024-01-04 DIAGNOSIS — I10 ESSENTIAL HYPERTENSION: Primary | ICD-10-CM

## 2024-01-05 RX ORDER — LOSARTAN POTASSIUM 50 MG/1
50 TABLET ORAL DAILY
Qty: 90 TABLET | Refills: 3 | Status: SHIPPED | OUTPATIENT
Start: 2024-01-05 | End: 2024-02-10 | Stop reason: HOSPADM

## 2024-01-11 ENCOUNTER — PATIENT OUTREACH (OUTPATIENT)
Dept: PRIMARY CARE | Facility: CLINIC | Age: 89
End: 2024-01-11
Payer: MEDICARE

## 2024-01-15 ENCOUNTER — TELEPHONE (OUTPATIENT)
Dept: PRIMARY CARE | Facility: CLINIC | Age: 89
End: 2024-01-15
Payer: MEDICARE

## 2024-01-15 DIAGNOSIS — D50.9 IRON DEFICIENCY ANEMIA, UNSPECIFIED IRON DEFICIENCY ANEMIA TYPE: ICD-10-CM

## 2024-01-15 DIAGNOSIS — J44.9 COPD, SEVERE (MULTI): ICD-10-CM

## 2024-01-15 NOTE — TELEPHONE ENCOUNTER
Pt has a cold on top of his copd. He is asking for an Rx cough syrup.  He is coughing with clear stuff coming up. Pt also needs refill of ferrous sulfate 325 (65 Fe) MG EC tablet  and inhaler.

## 2024-01-16 RX ORDER — FERROUS SULFATE 325(65) MG
1 TABLET, DELAYED RELEASE (ENTERIC COATED) ORAL DAILY
Qty: 90 TABLET | Refills: 3 | Status: SHIPPED | OUTPATIENT
Start: 2024-01-16 | End: 2024-02-12

## 2024-01-16 RX ORDER — ALBUTEROL SULFATE 90 UG/1
2 AEROSOL, METERED RESPIRATORY (INHALATION) EVERY 4 HOURS PRN
Qty: 18 G | Refills: 3 | Status: SHIPPED | OUTPATIENT
Start: 2024-01-16 | End: 2024-02-12

## 2024-02-01 ENCOUNTER — APPOINTMENT (OUTPATIENT)
Dept: RADIOLOGY | Facility: HOSPITAL | Age: 89
DRG: 280 | End: 2024-02-01
Payer: MEDICARE

## 2024-02-01 ENCOUNTER — TELEPHONE (OUTPATIENT)
Dept: PRIMARY CARE | Facility: CLINIC | Age: 89
End: 2024-02-01

## 2024-02-01 ENCOUNTER — HOSPITAL ENCOUNTER (INPATIENT)
Facility: HOSPITAL | Age: 89
LOS: 9 days | Discharge: NURSING, REHAB OR LONG-TERM CARE | DRG: 280 | End: 2024-02-10
Attending: EMERGENCY MEDICINE | Admitting: STUDENT IN AN ORGANIZED HEALTH CARE EDUCATION/TRAINING PROGRAM
Payer: MEDICARE

## 2024-02-01 ENCOUNTER — APPOINTMENT (OUTPATIENT)
Dept: CARDIOLOGY | Facility: HOSPITAL | Age: 89
DRG: 280 | End: 2024-02-01
Payer: MEDICARE

## 2024-02-01 DIAGNOSIS — I50.33 ACUTE ON CHRONIC DIASTOLIC HEART FAILURE (MULTI): ICD-10-CM

## 2024-02-01 DIAGNOSIS — M54.2 CERVICAL SPINE PAIN: ICD-10-CM

## 2024-02-01 DIAGNOSIS — R07.9 CHEST PAIN, UNSPECIFIED TYPE: ICD-10-CM

## 2024-02-01 DIAGNOSIS — K21.00 GASTROESOPHAGEAL REFLUX DISEASE WITH ESOPHAGITIS WITHOUT HEMORRHAGE: ICD-10-CM

## 2024-02-01 DIAGNOSIS — I50.9 ACUTE ON CHRONIC CONGESTIVE HEART FAILURE, UNSPECIFIED HEART FAILURE TYPE (MULTI): Primary | ICD-10-CM

## 2024-02-01 LAB
ALBUMIN SERPL BCP-MCNC: 4.2 G/DL (ref 3.4–5)
ALP SERPL-CCNC: 44 U/L (ref 33–136)
ALT SERPL W P-5'-P-CCNC: 10 U/L (ref 10–52)
ANION GAP SERPL CALC-SCNC: 14 MMOL/L (ref 10–20)
AST SERPL W P-5'-P-CCNC: 12 U/L (ref 9–39)
BASOPHILS # BLD AUTO: 0.02 X10*3/UL (ref 0–0.1)
BASOPHILS NFR BLD AUTO: 0.2 %
BILIRUB SERPL-MCNC: 0.7 MG/DL (ref 0–1.2)
BNP SERPL-MCNC: 601 PG/ML (ref 0–99)
BUN SERPL-MCNC: 30 MG/DL (ref 6–23)
CALCIUM SERPL-MCNC: 8.5 MG/DL (ref 8.6–10.3)
CARDIAC TROPONIN I PNL SERPL HS: 25 NG/L (ref 0–20)
CARDIAC TROPONIN I PNL SERPL HS: 25 NG/L (ref 0–20)
CHLORIDE SERPL-SCNC: 104 MMOL/L (ref 98–107)
CO2 SERPL-SCNC: 27 MMOL/L (ref 21–32)
CREAT SERPL-MCNC: 1.57 MG/DL (ref 0.5–1.3)
CRP SERPL-MCNC: 3.9 MG/DL
D DIMER PPP FEU-MCNC: 580 NG/ML FEU
EGFRCR SERPLBLD CKD-EPI 2021: 41 ML/MIN/1.73M*2
EOSINOPHIL # BLD AUTO: 0.01 X10*3/UL (ref 0–0.4)
EOSINOPHIL NFR BLD AUTO: 0.1 %
ERYTHROCYTE [DISTWIDTH] IN BLOOD BY AUTOMATED COUNT: 13.7 % (ref 11.5–14.5)
ERYTHROCYTE [SEDIMENTATION RATE] IN BLOOD BY WESTERGREN METHOD: 31 MM/H (ref 0–20)
FLUAV RNA RESP QL NAA+PROBE: NOT DETECTED
FLUBV RNA RESP QL NAA+PROBE: NOT DETECTED
GLUCOSE SERPL-MCNC: 195 MG/DL (ref 74–99)
HCT VFR BLD AUTO: 32.6 % (ref 41–52)
HGB BLD-MCNC: 9.9 G/DL (ref 13.5–17.5)
IMM GRANULOCYTES # BLD AUTO: 0.07 X10*3/UL (ref 0–0.5)
IMM GRANULOCYTES NFR BLD AUTO: 0.6 % (ref 0–0.9)
INR PPP: 1.1 (ref 0.9–1.1)
LYMPHOCYTES # BLD AUTO: 0.46 X10*3/UL (ref 0.8–3)
LYMPHOCYTES NFR BLD AUTO: 3.6 %
MCH RBC QN AUTO: 28 PG (ref 26–34)
MCHC RBC AUTO-ENTMCNC: 30.4 G/DL (ref 32–36)
MCV RBC AUTO: 92 FL (ref 80–100)
MONOCYTES # BLD AUTO: 0.81 X10*3/UL (ref 0.05–0.8)
MONOCYTES NFR BLD AUTO: 6.4 %
NEUTROPHILS # BLD AUTO: 11.29 X10*3/UL (ref 1.6–5.5)
NEUTROPHILS NFR BLD AUTO: 89.1 %
NRBC BLD-RTO: 0 /100 WBCS (ref 0–0)
PLATELET # BLD AUTO: 143 X10*3/UL (ref 150–450)
POTASSIUM SERPL-SCNC: 4.3 MMOL/L (ref 3.5–5.3)
PROT SERPL-MCNC: 6.8 G/DL (ref 6.4–8.2)
PROTHROMBIN TIME: 12.6 SECONDS (ref 9.8–12.8)
RBC # BLD AUTO: 3.53 X10*6/UL (ref 4.5–5.9)
RSV RNA RESP QL NAA+PROBE: NOT DETECTED
SARS-COV-2 RNA RESP QL NAA+PROBE: NOT DETECTED
SODIUM SERPL-SCNC: 141 MMOL/L (ref 136–145)
TSH SERPL-ACNC: 2 MIU/L (ref 0.44–3.98)
WBC # BLD AUTO: 12.7 X10*3/UL (ref 4.4–11.3)

## 2024-02-01 PROCEDURE — 71045 X-RAY EXAM CHEST 1 VIEW: CPT | Performed by: RADIOLOGY

## 2024-02-01 PROCEDURE — 85610 PROTHROMBIN TIME: CPT | Performed by: EMERGENCY MEDICINE

## 2024-02-01 PROCEDURE — 36415 COLL VENOUS BLD VENIPUNCTURE: CPT | Performed by: EMERGENCY MEDICINE

## 2024-02-01 PROCEDURE — 2500000001 HC RX 250 WO HCPCS SELF ADMINISTERED DRUGS (ALT 637 FOR MEDICARE OP): Performed by: EMERGENCY MEDICINE

## 2024-02-01 PROCEDURE — 87637 SARSCOV2&INF A&B&RSV AMP PRB: CPT | Performed by: PHYSICIAN ASSISTANT

## 2024-02-01 PROCEDURE — 93010 ELECTROCARDIOGRAM REPORT: CPT | Performed by: PHYSICIAN ASSISTANT

## 2024-02-01 PROCEDURE — 99285 EMERGENCY DEPT VISIT HI MDM: CPT | Performed by: PHYSICIAN ASSISTANT

## 2024-02-01 PROCEDURE — 93005 ELECTROCARDIOGRAM TRACING: CPT

## 2024-02-01 PROCEDURE — 85379 FIBRIN DEGRADATION QUANT: CPT | Performed by: EMERGENCY MEDICINE

## 2024-02-01 PROCEDURE — 84443 ASSAY THYROID STIM HORMONE: CPT

## 2024-02-01 PROCEDURE — 74176 CT ABD & PELVIS W/O CONTRAST: CPT

## 2024-02-01 PROCEDURE — 71250 CT THORAX DX C-: CPT | Performed by: RADIOLOGY

## 2024-02-01 PROCEDURE — 86140 C-REACTIVE PROTEIN: CPT

## 2024-02-01 PROCEDURE — 85025 COMPLETE CBC W/AUTO DIFF WBC: CPT | Performed by: EMERGENCY MEDICINE

## 2024-02-01 PROCEDURE — 74176 CT ABD & PELVIS W/O CONTRAST: CPT | Performed by: RADIOLOGY

## 2024-02-01 PROCEDURE — 99285 EMERGENCY DEPT VISIT HI MDM: CPT | Performed by: EMERGENCY MEDICINE

## 2024-02-01 PROCEDURE — 83880 ASSAY OF NATRIURETIC PEPTIDE: CPT | Performed by: EMERGENCY MEDICINE

## 2024-02-01 PROCEDURE — 93010 ELECTROCARDIOGRAM REPORT: CPT | Performed by: INTERNAL MEDICINE

## 2024-02-01 PROCEDURE — 85652 RBC SED RATE AUTOMATED: CPT

## 2024-02-01 PROCEDURE — 80053 COMPREHEN METABOLIC PANEL: CPT | Performed by: EMERGENCY MEDICINE

## 2024-02-01 PROCEDURE — 84484 ASSAY OF TROPONIN QUANT: CPT | Performed by: PHYSICIAN ASSISTANT

## 2024-02-01 PROCEDURE — 99223 1ST HOSP IP/OBS HIGH 75: CPT

## 2024-02-01 PROCEDURE — 84484 ASSAY OF TROPONIN QUANT: CPT | Performed by: EMERGENCY MEDICINE

## 2024-02-01 PROCEDURE — 36415 COLL VENOUS BLD VENIPUNCTURE: CPT | Performed by: PHYSICIAN ASSISTANT

## 2024-02-01 PROCEDURE — 1200000002 HC GENERAL ROOM WITH TELEMETRY DAILY

## 2024-02-01 PROCEDURE — 71045 X-RAY EXAM CHEST 1 VIEW: CPT

## 2024-02-01 RX ORDER — AMLODIPINE BESYLATE 10 MG/1
10 TABLET ORAL DAILY
Status: DISCONTINUED | OUTPATIENT
Start: 2024-02-01 | End: 2024-02-09

## 2024-02-01 RX ORDER — HEPARIN SODIUM 5000 [USP'U]/ML
5000 INJECTION, SOLUTION INTRAVENOUS; SUBCUTANEOUS EVERY 8 HOURS
Status: DISCONTINUED | OUTPATIENT
Start: 2024-02-01 | End: 2024-02-10 | Stop reason: HOSPADM

## 2024-02-01 RX ORDER — ACETAMINOPHEN 325 MG/1
650 TABLET ORAL EVERY 6 HOURS PRN
Status: DISCONTINUED | OUTPATIENT
Start: 2024-02-01 | End: 2024-02-10 | Stop reason: HOSPADM

## 2024-02-01 RX ORDER — FORMOTEROL FUMARATE DIHYDRATE 20 UG/2ML
20 SOLUTION RESPIRATORY (INHALATION)
Status: DISCONTINUED | OUTPATIENT
Start: 2024-02-01 | End: 2024-02-10 | Stop reason: HOSPADM

## 2024-02-01 RX ORDER — POLYETHYLENE GLYCOL 3350 17 G/17G
17 POWDER, FOR SOLUTION ORAL DAILY
Status: DISCONTINUED | OUTPATIENT
Start: 2024-02-01 | End: 2024-02-01

## 2024-02-01 RX ORDER — FAMOTIDINE 20 MG/1
20 TABLET, FILM COATED ORAL NIGHTLY
Status: DISCONTINUED | OUTPATIENT
Start: 2024-02-01 | End: 2024-02-06

## 2024-02-01 RX ORDER — SIMVASTATIN 40 MG/1
40 TABLET, FILM COATED ORAL NIGHTLY
Status: DISCONTINUED | OUTPATIENT
Start: 2024-02-01 | End: 2024-02-10 | Stop reason: HOSPADM

## 2024-02-01 RX ORDER — POLYETHYLENE GLYCOL 3350 17 G/17G
17 POWDER, FOR SOLUTION ORAL DAILY
Status: DISCONTINUED | OUTPATIENT
Start: 2024-02-01 | End: 2024-02-10 | Stop reason: HOSPADM

## 2024-02-01 RX ORDER — ALBUTEROL SULFATE 0.83 MG/ML
2.5 SOLUTION RESPIRATORY (INHALATION) EVERY 6 HOURS PRN
Status: DISCONTINUED | OUTPATIENT
Start: 2024-02-01 | End: 2024-02-05

## 2024-02-01 RX ORDER — TORSEMIDE 10 MG/1
10 TABLET ORAL ONCE
Status: COMPLETED | OUTPATIENT
Start: 2024-02-01 | End: 2024-02-01

## 2024-02-01 RX ORDER — NAPROXEN SODIUM 220 MG/1
324 TABLET, FILM COATED ORAL ONCE
Status: COMPLETED | OUTPATIENT
Start: 2024-02-01 | End: 2024-02-01

## 2024-02-01 RX ORDER — LOSARTAN POTASSIUM 50 MG/1
50 TABLET ORAL DAILY
Status: DISCONTINUED | OUTPATIENT
Start: 2024-02-01 | End: 2024-02-09

## 2024-02-01 RX ORDER — ALBUTEROL SULFATE 90 UG/1
2 AEROSOL, METERED RESPIRATORY (INHALATION) EVERY 4 HOURS PRN
Status: DISCONTINUED | OUTPATIENT
Start: 2024-02-01 | End: 2024-02-01

## 2024-02-01 RX ORDER — TORSEMIDE 10 MG/1
5 TABLET ORAL ONCE
Status: COMPLETED | OUTPATIENT
Start: 2024-02-01 | End: 2024-02-01

## 2024-02-01 RX ORDER — ACETAMINOPHEN 500 MG
5000 TABLET ORAL DAILY
Status: DISCONTINUED | OUTPATIENT
Start: 2024-02-01 | End: 2024-02-10 | Stop reason: HOSPADM

## 2024-02-01 RX ORDER — ENOXAPARIN SODIUM 100 MG/ML
30 INJECTION SUBCUTANEOUS EVERY 24 HOURS
Status: DISCONTINUED | OUTPATIENT
Start: 2024-02-01 | End: 2024-02-01

## 2024-02-01 RX ORDER — FLUTICASONE FUROATE AND VILANTEROL 200; 25 UG/1; UG/1
1 POWDER RESPIRATORY (INHALATION) DAILY
Status: DISCONTINUED | OUTPATIENT
Start: 2024-02-01 | End: 2024-02-01

## 2024-02-01 RX ORDER — TAMSULOSIN HYDROCHLORIDE 0.4 MG/1
0.4 CAPSULE ORAL DAILY
Status: DISCONTINUED | OUTPATIENT
Start: 2024-02-01 | End: 2024-02-10 | Stop reason: HOSPADM

## 2024-02-01 RX ORDER — ACETAMINOPHEN 325 MG/1
650 TABLET ORAL ONCE
Status: COMPLETED | OUTPATIENT
Start: 2024-02-01 | End: 2024-02-01

## 2024-02-01 RX ORDER — FUROSEMIDE 10 MG/ML
40 INJECTION INTRAMUSCULAR; INTRAVENOUS ONCE
Status: DISCONTINUED | OUTPATIENT
Start: 2024-02-01 | End: 2024-02-01

## 2024-02-01 RX ORDER — IPRATROPIUM BROMIDE 0.5 MG/2.5ML
0.5 SOLUTION RESPIRATORY (INHALATION)
Status: DISCONTINUED | OUTPATIENT
Start: 2024-02-01 | End: 2024-02-02

## 2024-02-01 RX ORDER — FUROSEMIDE 10 MG/ML
20 INJECTION INTRAMUSCULAR; INTRAVENOUS ONCE
Status: COMPLETED | OUTPATIENT
Start: 2024-02-01 | End: 2024-02-02

## 2024-02-01 RX ORDER — BUDESONIDE 0.5 MG/2ML
0.5 INHALANT ORAL
Status: DISCONTINUED | OUTPATIENT
Start: 2024-02-01 | End: 2024-02-10 | Stop reason: HOSPADM

## 2024-02-01 RX ORDER — POTASSIUM CHLORIDE 20 MEQ/1
10 TABLET, EXTENDED RELEASE ORAL NIGHTLY
Status: DISCONTINUED | OUTPATIENT
Start: 2024-02-01 | End: 2024-02-10 | Stop reason: HOSPADM

## 2024-02-01 RX ORDER — ASPIRIN 81 MG/1
81 TABLET ORAL NIGHTLY
Status: DISCONTINUED | OUTPATIENT
Start: 2024-02-01 | End: 2024-02-10 | Stop reason: HOSPADM

## 2024-02-01 RX ORDER — FERROUS SULFATE 325(65) MG
65 TABLET ORAL DAILY
Status: DISCONTINUED | OUTPATIENT
Start: 2024-02-01 | End: 2024-02-10 | Stop reason: HOSPADM

## 2024-02-01 RX ADMIN — TORSEMIDE 10 MG: 10 TABLET ORAL at 14:33

## 2024-02-01 RX ADMIN — ACETAMINOPHEN 650 MG: 325 TABLET ORAL at 14:33

## 2024-02-01 RX ADMIN — TORSEMIDE 5 MG: 10 TABLET ORAL at 17:46

## 2024-02-01 RX ADMIN — ASPIRIN 81 MG 324 MG: 81 TABLET ORAL at 14:33

## 2024-02-01 SDOH — SOCIAL STABILITY: SOCIAL INSECURITY: HAVE YOU HAD THOUGHTS OF HARMING ANYONE ELSE?: NO

## 2024-02-01 SDOH — SOCIAL STABILITY: SOCIAL INSECURITY: DO YOU FEEL ANYONE HAS EXPLOITED OR TAKEN ADVANTAGE OF YOU FINANCIALLY OR OF YOUR PERSONAL PROPERTY?: NO

## 2024-02-01 SDOH — SOCIAL STABILITY: SOCIAL INSECURITY: WERE YOU ABLE TO COMPLETE ALL THE BEHAVIORAL HEALTH SCREENINGS?: YES

## 2024-02-01 SDOH — SOCIAL STABILITY: SOCIAL INSECURITY: DOES ANYONE TRY TO KEEP YOU FROM HAVING/CONTACTING OTHER FRIENDS OR DOING THINGS OUTSIDE YOUR HOME?: NO

## 2024-02-01 SDOH — SOCIAL STABILITY: SOCIAL INSECURITY: ARE YOU OR HAVE YOU BEEN THREATENED OR ABUSED PHYSICALLY, EMOTIONALLY, OR SEXUALLY BY ANYONE?: NO

## 2024-02-01 SDOH — SOCIAL STABILITY: SOCIAL INSECURITY: ABUSE: ADULT

## 2024-02-01 SDOH — SOCIAL STABILITY: SOCIAL INSECURITY: HAS ANYONE EVER THREATENED TO HURT YOUR FAMILY OR YOUR PETS?: NO

## 2024-02-01 SDOH — SOCIAL STABILITY: SOCIAL INSECURITY: DO YOU FEEL UNSAFE GOING BACK TO THE PLACE WHERE YOU ARE LIVING?: NO

## 2024-02-01 ASSESSMENT — LIFESTYLE VARIABLES
AUDIT-C TOTAL SCORE: 2
HAVE YOU EVER FELT YOU SHOULD CUT DOWN ON YOUR DRINKING: NO
AUDIT-C TOTAL SCORE: 2
HOW MANY STANDARD DRINKS CONTAINING ALCOHOL DO YOU HAVE ON A TYPICAL DAY: 1 OR 2
SKIP TO QUESTIONS 9-10: 0
HAVE PEOPLE ANNOYED YOU BY CRITICIZING YOUR DRINKING: NO
HOW OFTEN DO YOU HAVE 6 OR MORE DRINKS ON ONE OCCASION: LESS THAN MONTHLY
EVER FELT BAD OR GUILTY ABOUT YOUR DRINKING: NO
HOW OFTEN DO YOU HAVE A DRINK CONTAINING ALCOHOL: MONTHLY OR LESS
EVER HAD A DRINK FIRST THING IN THE MORNING TO STEADY YOUR NERVES TO GET RID OF A HANGOVER: NO

## 2024-02-01 ASSESSMENT — ACTIVITIES OF DAILY LIVING (ADL)
WALKS IN HOME: NEEDS ASSISTANCE
HEARING - RIGHT EAR: HEARING AID
FEEDING YOURSELF: INDEPENDENT
BATHING: INDEPENDENT
ADEQUATE_TO_COMPLETE_ADL: YES
LACK_OF_TRANSPORTATION: NO
PATIENT'S MEMORY ADEQUATE TO SAFELY COMPLETE DAILY ACTIVITIES?: YES
JUDGMENT_ADEQUATE_SAFELY_COMPLETE_DAILY_ACTIVITIES: YES
GROOMING: INDEPENDENT
TOILETING: INDEPENDENT
HEARING - LEFT EAR: HEARING AID
DRESSING YOURSELF: INDEPENDENT

## 2024-02-01 ASSESSMENT — COGNITIVE AND FUNCTIONAL STATUS - GENERAL
WALKING IN HOSPITAL ROOM: A LITTLE
STANDING UP FROM CHAIR USING ARMS: A LITTLE
PATIENT BASELINE BEDBOUND: NO
CLIMB 3 TO 5 STEPS WITH RAILING: A LITTLE
DAILY ACTIVITIY SCORE: 24
MOVING TO AND FROM BED TO CHAIR: A LITTLE
MOBILITY SCORE: 20

## 2024-02-01 ASSESSMENT — PATIENT HEALTH QUESTIONNAIRE - PHQ9
1. LITTLE INTEREST OR PLEASURE IN DOING THINGS: NOT AT ALL
SUM OF ALL RESPONSES TO PHQ9 QUESTIONS 1 & 2: 0
2. FEELING DOWN, DEPRESSED OR HOPELESS: NOT AT ALL

## 2024-02-01 ASSESSMENT — PAIN - FUNCTIONAL ASSESSMENT: PAIN_FUNCTIONAL_ASSESSMENT: 0-10

## 2024-02-01 ASSESSMENT — PAIN DESCRIPTION - ORIENTATION: ORIENTATION: MID

## 2024-02-01 ASSESSMENT — PAIN DESCRIPTION - DESCRIPTORS
DESCRIPTORS: BURNING;STABBING;SHARP
DESCRIPTORS: SHARP

## 2024-02-01 ASSESSMENT — PAIN DESCRIPTION - ONSET: ONSET: ONGOING

## 2024-02-01 ASSESSMENT — PAIN DESCRIPTION - PAIN TYPE: TYPE: ACUTE PAIN

## 2024-02-01 ASSESSMENT — PAIN SCALES - GENERAL
PAINLEVEL_OUTOF10: 6
PAINLEVEL_OUTOF10: 5 - MODERATE PAIN

## 2024-02-01 ASSESSMENT — PAIN DESCRIPTION - PROGRESSION: CLINICAL_PROGRESSION: GRADUALLY WORSENING

## 2024-02-01 ASSESSMENT — PAIN DESCRIPTION - LOCATION: LOCATION: CHEST

## 2024-02-01 ASSESSMENT — PAIN DESCRIPTION - FREQUENCY: FREQUENCY: CONSTANT/CONTINUOUS

## 2024-02-01 NOTE — ED PROVIDER NOTES
"HPI   Chief Complaint   Patient presents with    Chest Pain     Midsternal CP that started last night. Almost constant 6/10 pain. Radiates to the back.       This is a 89-year-old male with a past medical history of HFpEF 50 to 55% October 2023, COPD on 2 L via NC, prostate cancer, moderate to severe aortic stenosis presents to the emergency department with his son with concern for chest pain.  States yesterday while watching TV at rest, he developed onset of left-sided chest pain that radiates to the back.  There is no associated shortness of breath more than usual.  Reports compliance with his medications including torsemide 10 mg daily, last dose yesterday morning, endorses increased edema in his feet.  Chest pain is worse with deep inspiration and positional changes.  He does report prodromal \"cold\" symptoms that resolved about 3 to 4 days ago.  He denies associated nausea, vomiting, headache, blurry vision, double vision, abdominal pain, diarrhea, constipation, urinary symptoms.                [place] Coma Scale Score: 15                  Patient History   Past Medical History:   Diagnosis Date    Pain in toes of both feet 02/02/2024    Pain of toe 02/02/2024    Personal history of malignant neoplasm of prostate 03/22/2021    History of malignant neoplasm of prostate     Past Surgical History:   Procedure Laterality Date    OTHER SURGICAL HISTORY  03/22/2021    Appendectomy     Family History   Problem Relation Name Age of Onset    Hypertension Mother      Heart attack Mother       Social History     Tobacco Use    Smoking status: Never    Smokeless tobacco: Never   Substance Use Topics    Alcohol use: Never    Drug use: Never       Physical Exam   ED Triage Vitals [02/01/24 1253]   Temperature Heart Rate Respirations BP   37.7 °C (99.9 °F) 80 (!) 22 134/70      Pulse Ox Temp src Heart Rate Source Patient Position   94 % -- Monitor Sitting      BP Location FiO2 (%)     Right arm --       Physical Exam  Vitals " reviewed.   Constitutional:       Appearance: He is well-developed.   HENT:      Head: Normocephalic and atraumatic.   Eyes:      Extraocular Movements: Extraocular movements intact.      Pupils: Pupils are equal, round, and reactive to light.   Cardiovascular:      Rate and Rhythm: Normal rate and regular rhythm.      Heart sounds: Normal heart sounds.   Pulmonary:      Effort: Pulmonary effort is normal.      Breath sounds: Examination of the right-upper field reveals rhonchi. Examination of the left-upper field reveals rhonchi. Examination of the right-middle field reveals rhonchi. Examination of the left-middle field reveals rhonchi. Rhonchi present.   Abdominal:      Palpations: Abdomen is soft.      Tenderness: There is no abdominal tenderness.   Neurological:      Mental Status: He is alert.         ED Course & MDM   Diagnoses as of 02/04/24 0601   Acute on chronic congestive heart failure, unspecified heart failure type (CMS/HCC)   Chest pain, unspecified type       Medical Decision Making  89-year-old male, is alert and oriented x 3, afebrile and hemodynamically stable presenting to the emergency department with concern for chest pain.    Examination reveals regular cardiac rate with no audible murmurs, rubs or gallops.  He has diffuse bilateral rhonchi.  Abdomen is soft and nontender.  No reproducibility of chest pain with palpation.  He has symmetrical pulses in bilateral upper extremities.  No evidence of JVD/JVP.    EKG sinus rhythm with arrhythmia rate 87 bpm, left axis deviation, IL interval 168 ms, QTc 442 ms, no contiguous ST-T wave abnormalities noted.    IV access established, laboratory workup performed revealing an elevated high-sensitivity troponin of 25, though appears he has had multiple values in the past in the low 20s.  .  Chest x-ray performed revealing diffuse pulmonary edema and concern for possible free air versus bowel gas deep to the right hemidiaphragm.    He has a reassuring  age-adjusted D-dimer of 580, hence low concerns for pulmonary embolism.  High sensitive troponin 25, 25.  A CT of the chest/abdomen/pelvis without IV contrast was subsequently performed without evidence of abdominal free air.  He was given his home torsemide in the ED today.    The patient was be admitted for further management of his chest pain and CHF exacerbation, may benefit from gentle diuresis given severe aortic stenosis and cardiology consultation.  The patient remained hemodynamically stable during my care.        Procedure  Procedures     Johnson Mcdaniel PA-C  02/01/24 1938       Johnson Mcdaniel PA-C  02/01/24 2029    The patient was seen by the resident/fellow.  I have personally performed a substantive portion of the encounter.  I have seen and examined the patient; agree with the workup, evaluation, MDM, management and diagnosis.  The care plan has been discussed with the resident; I have reviewed the resident’s note and agree with the documented findings.         Luis Sterling DO  02/04/24 0602

## 2024-02-01 NOTE — TELEPHONE ENCOUNTER
Son reports Pt has had a severe headache in the back of his head since 4am causing severe discomfort. Pt asking for a call back.

## 2024-02-02 ENCOUNTER — APPOINTMENT (OUTPATIENT)
Dept: CARDIOLOGY | Facility: HOSPITAL | Age: 89
DRG: 280 | End: 2024-02-02
Payer: MEDICARE

## 2024-02-02 PROBLEM — M79.674 PAIN IN TOES OF BOTH FEET: Status: RESOLVED | Noted: 2024-02-02 | Resolved: 2024-02-02

## 2024-02-02 PROBLEM — M81.0 OSTEOPOROSIS: Status: ACTIVE | Noted: 2023-02-16

## 2024-02-02 PROBLEM — M79.676 PAIN OF TOE: Status: RESOLVED | Noted: 2024-02-02 | Resolved: 2024-02-02

## 2024-02-02 PROBLEM — M79.675 PAIN IN TOES OF BOTH FEET: Status: RESOLVED | Noted: 2024-02-02 | Resolved: 2024-02-02

## 2024-02-02 PROBLEM — B35.1 ONYCHOMYCOSIS: Status: ACTIVE | Noted: 2024-02-02

## 2024-02-02 PROBLEM — E66.9 OBESITY WITH BODY MASS INDEX 30 OR GREATER: Status: ACTIVE | Noted: 2024-02-02

## 2024-02-02 LAB
ALBUMIN SERPL BCP-MCNC: 3.4 G/DL (ref 3.4–5)
ALP SERPL-CCNC: 36 U/L (ref 33–136)
ALT SERPL W P-5'-P-CCNC: 9 U/L (ref 10–52)
ANION GAP SERPL CALC-SCNC: 10 MMOL/L (ref 10–20)
AST SERPL W P-5'-P-CCNC: 10 U/L (ref 9–39)
ATRIAL RATE: 87 BPM
ATRIAL RATE: 88 BPM
BILIRUB SERPL-MCNC: 0.5 MG/DL (ref 0–1.2)
BUN SERPL-MCNC: 31 MG/DL (ref 6–23)
CALCIUM SERPL-MCNC: 8.2 MG/DL (ref 8.6–10.3)
CARDIAC TROPONIN I PNL SERPL HS: 24 NG/L (ref 0–20)
CHLORIDE SERPL-SCNC: 106 MMOL/L (ref 98–107)
CO2 SERPL-SCNC: 27 MMOL/L (ref 21–32)
CREAT SERPL-MCNC: 1.56 MG/DL (ref 0.5–1.3)
EGFRCR SERPLBLD CKD-EPI 2021: 41 ML/MIN/1.73M*2
EJECTION FRACTION APICAL 4 CHAMBER: 38
EJECTION FRACTION: 39 %
GLUCOSE SERPL-MCNC: 109 MG/DL (ref 74–99)
HOLD SPECIMEN: NORMAL
LEFT VENTRICLE INTERNAL DIMENSION DIASTOLE: 5.1 CM (ref 3.5–6)
LEFT VENTRICULAR OUTFLOW TRACT DIAMETER: 2.1 CM
MAGNESIUM SERPL-MCNC: 2.05 MG/DL (ref 1.6–2.4)
MITRAL VALVE E/A RATIO: 1.3
MITRAL VALVE E/E' RATIO: 27.37
P AXIS: 38 DEGREES
P AXIS: 96 DEGREES
P OFFSET: 168 MS
P OFFSET: 182 MS
P ONSET: 125 MS
P ONSET: 134 MS
POTASSIUM SERPL-SCNC: 4.1 MMOL/L (ref 3.5–5.3)
PR INTERVAL: 168 MS
PR INTERVAL: 192 MS
PROT SERPL-MCNC: 5.8 G/DL (ref 6.4–8.2)
Q ONSET: 218 MS
Q ONSET: 221 MS
QRS COUNT: 14 BEATS
QRS COUNT: 15 BEATS
QRS DURATION: 82 MS
QRS DURATION: 86 MS
QT INTERVAL: 368 MS
QT INTERVAL: 374 MS
QTC CALCULATION(BAZETT): 442 MS
QTC CALCULATION(BAZETT): 452 MS
QTC FREDERICIA: 416 MS
QTC FREDERICIA: 425 MS
R AXIS: -20 DEGREES
R AXIS: -23 DEGREES
RIGHT VENTRICLE FREE WALL PEAK S': 8.16 CM/S
RIGHT VENTRICLE PEAK SYSTOLIC PRESSURE: 66 MMHG
SODIUM SERPL-SCNC: 139 MMOL/L (ref 136–145)
T AXIS: 48 DEGREES
T AXIS: 64 DEGREES
T OFFSET: 402 MS
T OFFSET: 408 MS
TRICUSPID ANNULAR PLANE SYSTOLIC EXCURSION: 2 CM
VENTRICULAR RATE: 87 BPM
VENTRICULAR RATE: 88 BPM

## 2024-02-02 PROCEDURE — 2500000004 HC RX 250 GENERAL PHARMACY W/ HCPCS (ALT 636 FOR OP/ED)

## 2024-02-02 PROCEDURE — 2500000001 HC RX 250 WO HCPCS SELF ADMINISTERED DRUGS (ALT 637 FOR MEDICARE OP): Performed by: STUDENT IN AN ORGANIZED HEALTH CARE EDUCATION/TRAINING PROGRAM

## 2024-02-02 PROCEDURE — 93325 DOPPLER ECHO COLOR FLOW MAPG: CPT

## 2024-02-02 PROCEDURE — 2500000004 HC RX 250 GENERAL PHARMACY W/ HCPCS (ALT 636 FOR OP/ED): Performed by: INTERNAL MEDICINE

## 2024-02-02 PROCEDURE — 80053 COMPREHEN METABOLIC PANEL: CPT | Performed by: STUDENT IN AN ORGANIZED HEALTH CARE EDUCATION/TRAINING PROGRAM

## 2024-02-02 PROCEDURE — 2500000002 HC RX 250 W HCPCS SELF ADMINISTERED DRUGS (ALT 637 FOR MEDICARE OP, ALT 636 FOR OP/ED): Performed by: STUDENT IN AN ORGANIZED HEALTH CARE EDUCATION/TRAINING PROGRAM

## 2024-02-02 PROCEDURE — 2500000004 HC RX 250 GENERAL PHARMACY W/ HCPCS (ALT 636 FOR OP/ED): Performed by: STUDENT IN AN ORGANIZED HEALTH CARE EDUCATION/TRAINING PROGRAM

## 2024-02-02 PROCEDURE — 84484 ASSAY OF TROPONIN QUANT: CPT

## 2024-02-02 PROCEDURE — 93325 DOPPLER ECHO COLOR FLOW MAPG: CPT | Performed by: INTERNAL MEDICINE

## 2024-02-02 PROCEDURE — 94640 AIRWAY INHALATION TREATMENT: CPT

## 2024-02-02 PROCEDURE — 36415 COLL VENOUS BLD VENIPUNCTURE: CPT | Performed by: STUDENT IN AN ORGANIZED HEALTH CARE EDUCATION/TRAINING PROGRAM

## 2024-02-02 PROCEDURE — 2500000001 HC RX 250 WO HCPCS SELF ADMINISTERED DRUGS (ALT 637 FOR MEDICARE OP): Performed by: INTERNAL MEDICINE

## 2024-02-02 PROCEDURE — 97165 OT EVAL LOW COMPLEX 30 MIN: CPT | Mod: GO

## 2024-02-02 PROCEDURE — 93321 DOPPLER ECHO F-UP/LMTD STD: CPT | Performed by: INTERNAL MEDICINE

## 2024-02-02 PROCEDURE — 97161 PT EVAL LOW COMPLEX 20 MIN: CPT | Mod: GP

## 2024-02-02 PROCEDURE — 1200000002 HC GENERAL ROOM WITH TELEMETRY DAILY

## 2024-02-02 PROCEDURE — 99223 1ST HOSP IP/OBS HIGH 75: CPT

## 2024-02-02 PROCEDURE — 99232 SBSQ HOSP IP/OBS MODERATE 35: CPT | Performed by: NURSE PRACTITIONER

## 2024-02-02 PROCEDURE — 93308 TTE F-UP OR LMTD: CPT | Performed by: INTERNAL MEDICINE

## 2024-02-02 PROCEDURE — 83735 ASSAY OF MAGNESIUM: CPT

## 2024-02-02 PROCEDURE — 2500000001 HC RX 250 WO HCPCS SELF ADMINISTERED DRUGS (ALT 637 FOR MEDICARE OP)

## 2024-02-02 RX ORDER — TRAMADOL HYDROCHLORIDE 50 MG/1
50 TABLET ORAL ONCE
Status: COMPLETED | OUTPATIENT
Start: 2024-02-02 | End: 2024-02-02

## 2024-02-02 RX ORDER — DICLOFENAC SODIUM 10 MG/G
4 GEL TOPICAL 4 TIMES DAILY PRN
Status: DISCONTINUED | OUTPATIENT
Start: 2024-02-02 | End: 2024-02-10 | Stop reason: HOSPADM

## 2024-02-02 RX ORDER — FUROSEMIDE 10 MG/ML
20 INJECTION INTRAMUSCULAR; INTRAVENOUS ONCE
Status: COMPLETED | OUTPATIENT
Start: 2024-02-02 | End: 2024-02-02

## 2024-02-02 RX ORDER — IPRATROPIUM BROMIDE 0.5 MG/2.5ML
0.5 SOLUTION RESPIRATORY (INHALATION) 3 TIMES DAILY
Status: DISCONTINUED | OUTPATIENT
Start: 2024-02-03 | End: 2024-02-05

## 2024-02-02 RX ORDER — METOPROLOL SUCCINATE 25 MG/1
25 TABLET, EXTENDED RELEASE ORAL DAILY
Status: DISCONTINUED | OUTPATIENT
Start: 2024-02-02 | End: 2024-02-10 | Stop reason: HOSPADM

## 2024-02-02 RX ADMIN — TRAMADOL HYDROCHLORIDE 50 MG: 50 TABLET, COATED ORAL at 02:29

## 2024-02-02 RX ADMIN — ASPIRIN 81 MG: 81 TABLET, COATED ORAL at 00:18

## 2024-02-02 RX ADMIN — BUDESONIDE 0.5 MG: 0.5 INHALANT ORAL at 21:18

## 2024-02-02 RX ADMIN — HEPARIN SODIUM 5000 UNITS: 5000 INJECTION INTRAVENOUS; SUBCUTANEOUS at 00:19

## 2024-02-02 RX ADMIN — AMLODIPINE BESYLATE 10 MG: 10 TABLET ORAL at 00:43

## 2024-02-02 RX ADMIN — ASPIRIN 81 MG: 81 TABLET, COATED ORAL at 22:08

## 2024-02-02 RX ADMIN — FUROSEMIDE 20 MG: 10 INJECTION, SOLUTION INTRAMUSCULAR; INTRAVENOUS at 16:12

## 2024-02-02 RX ADMIN — POLYETHYLENE GLYCOL 3350 17 G: 17 POWDER, FOR SOLUTION ORAL at 00:20

## 2024-02-02 RX ADMIN — POTASSIUM CHLORIDE 10 MEQ: 1500 TABLET, EXTENDED RELEASE ORAL at 22:08

## 2024-02-02 RX ADMIN — CHOLECALCIFEROL TAB 125 MCG (5000 UNIT) 5000 UNITS: 125 TAB at 00:19

## 2024-02-02 RX ADMIN — LOSARTAN POTASSIUM 50 MG: 50 TABLET, FILM COATED ORAL at 08:51

## 2024-02-02 RX ADMIN — FAMOTIDINE 20 MG: 20 TABLET ORAL at 00:19

## 2024-02-02 RX ADMIN — IPRATROPIUM BROMIDE 0.5 MG: 0.5 SOLUTION RESPIRATORY (INHALATION) at 21:18

## 2024-02-02 RX ADMIN — CHOLECALCIFEROL TAB 125 MCG (5000 UNIT) 5000 UNITS: 125 TAB at 08:51

## 2024-02-02 RX ADMIN — FAMOTIDINE 20 MG: 20 TABLET ORAL at 22:07

## 2024-02-02 RX ADMIN — SIMVASTATIN 40 MG: 40 TABLET, FILM COATED ORAL at 22:08

## 2024-02-02 RX ADMIN — FERROUS SULFATE TAB 325 MG (65 MG ELEMENTAL FE) 1 TABLET: 325 (65 FE) TAB at 08:51

## 2024-02-02 RX ADMIN — ACETAMINOPHEN 650 MG: 325 TABLET ORAL at 00:19

## 2024-02-02 RX ADMIN — ACETAMINOPHEN 650 MG: 325 TABLET ORAL at 14:34

## 2024-02-02 RX ADMIN — HEPARIN SODIUM 5000 UNITS: 5000 INJECTION INTRAVENOUS; SUBCUTANEOUS at 16:12

## 2024-02-02 RX ADMIN — AMLODIPINE BESYLATE 10 MG: 10 TABLET ORAL at 08:51

## 2024-02-02 RX ADMIN — POLYETHYLENE GLYCOL 3350 17 G: 17 POWDER, FOR SOLUTION ORAL at 08:52

## 2024-02-02 RX ADMIN — HEPARIN SODIUM 5000 UNITS: 5000 INJECTION INTRAVENOUS; SUBCUTANEOUS at 08:51

## 2024-02-02 RX ADMIN — FORMOTEROL FUMARATE 20 MCG: 20 SOLUTION RESPIRATORY (INHALATION) at 21:17

## 2024-02-02 RX ADMIN — FORMOTEROL FUMARATE 20 MCG: 20 SOLUTION RESPIRATORY (INHALATION) at 08:09

## 2024-02-02 RX ADMIN — TAMSULOSIN HYDROCHLORIDE 0.4 MG: 0.4 CAPSULE ORAL at 00:18

## 2024-02-02 RX ADMIN — POTASSIUM CHLORIDE 10 MEQ: 1500 TABLET, EXTENDED RELEASE ORAL at 00:19

## 2024-02-02 RX ADMIN — FUROSEMIDE 20 MG: 10 INJECTION, SOLUTION INTRAMUSCULAR; INTRAVENOUS at 00:19

## 2024-02-02 RX ADMIN — LOSARTAN POTASSIUM 50 MG: 50 TABLET, FILM COATED ORAL at 00:43

## 2024-02-02 RX ADMIN — BUDESONIDE 0.5 MG: 0.5 INHALANT ORAL at 08:08

## 2024-02-02 RX ADMIN — FERROUS SULFATE TAB 325 MG (65 MG ELEMENTAL FE) 1 TABLET: 325 (65 FE) TAB at 00:19

## 2024-02-02 RX ADMIN — IPRATROPIUM BROMIDE 0.5 MG: 0.5 SOLUTION RESPIRATORY (INHALATION) at 08:10

## 2024-02-02 RX ADMIN — SIMVASTATIN 40 MG: 40 TABLET, FILM COATED ORAL at 00:19

## 2024-02-02 RX ADMIN — METOPROLOL SUCCINATE 25 MG: 25 TABLET, EXTENDED RELEASE ORAL at 16:12

## 2024-02-02 RX ADMIN — TAMSULOSIN HYDROCHLORIDE 0.4 MG: 0.4 CAPSULE ORAL at 08:51

## 2024-02-02 SDOH — ECONOMIC STABILITY: INCOME INSECURITY: IN THE PAST 12 MONTHS, HAS THE ELECTRIC, GAS, OIL, OR WATER COMPANY THREATENED TO SHUT OFF SERVICE IN YOUR HOME?: NO

## 2024-02-02 SDOH — ECONOMIC STABILITY: FOOD INSECURITY: WITHIN THE PAST 12 MONTHS, YOU WORRIED THAT YOUR FOOD WOULD RUN OUT BEFORE YOU GOT MONEY TO BUY MORE.: NEVER TRUE

## 2024-02-02 SDOH — ECONOMIC STABILITY: FOOD INSECURITY: WITHIN THE PAST 12 MONTHS, THE FOOD YOU BOUGHT JUST DIDN'T LAST AND YOU DIDN'T HAVE MONEY TO GET MORE.: NEVER TRUE

## 2024-02-02 ASSESSMENT — COGNITIVE AND FUNCTIONAL STATUS - GENERAL
DAILY ACTIVITIY SCORE: 24
MOBILITY SCORE: 20
DRESSING REGULAR LOWER BODY CLOTHING: A LITTLE
HELP NEEDED FOR BATHING: A LITTLE
MOVING TO AND FROM BED TO CHAIR: A LITTLE
STANDING UP FROM CHAIR USING ARMS: A LITTLE
CLIMB 3 TO 5 STEPS WITH RAILING: A LITTLE
DAILY ACTIVITIY SCORE: 20
CLIMB 3 TO 5 STEPS WITH RAILING: A LITTLE
WALKING IN HOSPITAL ROOM: A LITTLE
STANDING UP FROM CHAIR USING ARMS: A LITTLE
TOILETING: A LITTLE
TURNING FROM BACK TO SIDE WHILE IN FLAT BAD: A LITTLE
PERSONAL GROOMING: A LITTLE
WALKING IN HOSPITAL ROOM: A LITTLE
MOVING TO AND FROM BED TO CHAIR: A LITTLE
MOBILITY SCORE: 19

## 2024-02-02 ASSESSMENT — PAIN SCALES - GENERAL
PAINLEVEL_OUTOF10: 6
PAINLEVEL_OUTOF10: 0 - NO PAIN
PAINLEVEL_OUTOF10: 5 - MODERATE PAIN
PAINLEVEL_OUTOF10: 6
PAINLEVEL_OUTOF10: 7
PAINLEVEL_OUTOF10: 5 - MODERATE PAIN
PAINLEVEL_OUTOF10: 0 - NO PAIN

## 2024-02-02 ASSESSMENT — ACTIVITIES OF DAILY LIVING (ADL): ADL_ASSISTANCE: INDEPENDENT

## 2024-02-02 ASSESSMENT — PAIN - FUNCTIONAL ASSESSMENT
PAIN_FUNCTIONAL_ASSESSMENT: 0-10

## 2024-02-02 NOTE — PROGRESS NOTES
02/02/24 1459   Discharge Planning   Living Arrangements Spouse/significant other   Support Systems Spouse/significant other   Assistance Needed no   Type of Residence Private residence   Home or Post Acute Services In home services   Type of Home Care Services Home OT;Home PT   Patient expects to be discharged to: Home with [facility]   Does the patient need discharge transport arranged? No   Patient Choice   Patient / Family choosing to utilize agency / facility established prior to hospitalization Yes     Met with patient and patient's son at bedside. Patient lives at home with spouse in [place]. Patient states he is normally independent with ADLs, uses a cane to ambulate and patient states he has a walker at home. PCP is Kentrell Russell. Therapy recommending low intensity at d/c. Patient is agreeable to [facility] and has used [facility] in the past and would like to use the same agency. Referral sent. [facility] can accept.  Will need outgoing referral/orders placed, and [facility] will need to send home care orders prior to d/c. Patient states he feels safe to return home when medically ready for d/c.

## 2024-02-02 NOTE — CONSULTS
Inpatient consult to Cardiology  Consult performed by: Aneesh Kyle MD  Consult ordered by: Seferino Matthew DO  Reason for consult: Acute exacerbation of heart failure: with ACS rule out        Reason For Consult  Midsternal chest pain for ACS rule out in the setting of acute exacerbations of heart failure.      Subjective   Michael Sandra  is a 89 y.o. male who presented to the ED with left-sided chest pain for 2 days.  He has significant past medical history of  HFpEF (EF: 50 to 55% October 2023), COPD (baseline- 2 L NC), dyslipidemia, CKD (baseline CR: 1.8), COPD, anemia, pulmonary hypertension, prostate cancer and moderate to severe aortic stenosis     The patient reported that his chest discomfort began 2 days ago while watching TV, without any specific triggering event. The sensation was sudden, dull, and more of a discomfort than pain, rated 6-7/10, not radiating, aggravated by touch and deep breathing, with no alleviating factors. He experienced associated shortness of breath and a dry cough, which started as cold symptoms a week prior. He confirmed that he had never experienced similar chest discomfort before and that it did not correlate with exertion or food intake. He uses oxygen at home for COPD and adheres to his medication regimen. He also noted some lower limb swelling without changes in weight. There's no recent hospitalization or exposure to illness. Additionally, the patient denied fever, chills, abdominal pain, nausea/vomiting, numbness, tingling sensations, or alterations in bowel or urinary habits.      In the ED patient was vitally stable on 2 L, CBC significant for leukocytosis with CMP showed BNP of 601, initial troponin of 25, 25 down trended to 24.  EKG showed normal sinus rhythm with PACs which is similar to the previous EKGs with no ischemic changes.  Chest x-ray was significant for cardiomegaly with pulmonary edema.  CT chest abdomen and pelvis showed infiltrate/atelectasis in the  posterior lobes with mild dilatation of ascending aorta which is similar to previous scans.  Patient was given loading dose of aspirin, home dose torsemide and was admitted for CHF exacerbation with ACS rule out.    Past Medical History:   Diagnosis Date    Pain in toes of both feet 02/02/2024    Pain of toe 02/02/2024    Personal history of malignant neoplasm of prostate 03/22/2021    History of malignant neoplasm of prostate     Past Surgical History:   Procedure Laterality Date    OTHER SURGICAL HISTORY  03/22/2021    Appendectomy     Facility-Administered Medications Prior to Admission   Medication Dose Route Frequency Provider Last Rate Last Admin    torsemide (Demadex) tablet 10 mg  10 mg oral Daily PRN Malu Haynes, APRN-CNP         Medications Prior to Admission   Medication Sig Dispense Refill Last Dose    albuterol 90 mcg/actuation inhaler Inhale 2 puffs every 4 hours if needed for wheezing or shortness of breath. 18 g 3 Past Week    amLODIPine (Norvasc) 10 mg tablet Take 1 tablet (10 mg) by mouth once daily.   2/1/2024 at am    aspirin 81 mg EC tablet Take 1 tablet (81 mg) by mouth once daily at bedtime.   1/31/2024 at am    Breo Ellipta 200-25 mcg/dose inhaler USE 1 INHALATION BY MOUTH ONCE  DAILY (Patient taking differently: Inhale 1 puff once daily.) 180 each 3 2/1/2024 at am    calcium carbonate (Oscal) 500 mg calcium (1,250 mg) tablet Take 500 mg by mouth once daily.   2/1/2024 at am    cholecalciferol (Vitamin D-3) 5,000 Units tablet Take 1 tablet (5,000 Units) by mouth once daily.   2/1/2024 at am    denosumab (Prolia) 60 mg/mL syringe Inject 1 mL (60 mg) under the skin every 6 months.       famotidine (Pepcid) 20 mg tablet TAKE 1 TABLET BY MOUTH DAILY AS  DIRECTED (Patient taking differently: Take 1 tablet (20 mg) by mouth once daily at bedtime. as directed) 90 tablet 3 1/31/2024    ferrous sulfate 325 (65 Fe) MG EC tablet Take 1 tablet by mouth once daily. 90 tablet 3 2/1/2024    losartan  (Cozaar) 50 mg tablet TAKE 1 TABLET BY MOUTH DAILY AS  DIRECTED 90 tablet 3 2/1/2024 at am    oxygen (O2) gas therapy 2 L/min. via nasal cannula at bedtime.       potassium chloride CR 10 mEq ER tablet TAKE 1 TABLET BY MOUTH DAILY (Patient taking differently: Take 1 tablet (10 mEq) by mouth once daily at bedtime.) 90 tablet 3 1/31/2024    simvastatin (Zocor) 40 mg tablet Take 1 tablet (40 mg) by mouth once daily at bedtime.   1/31/2024    Spiriva Respimat 2.5 mcg/actuation inhaler INHALE 2 INHALATIONS BY MOUTH  DAILY (Patient taking differently: Inhale 2 puffs once daily.) 12 g 3 2/1/2024 at am    tamsulosin (Flomax) 0.4 mg 24 hr capsule Take 1 capsule (0.4 mg) by mouth once daily.   2/1/2024 at am    torsemide (Demadex) 10 mg tablet TAKE 1 TABLET BY MOUTH DAILY 90 tablet 3 1/31/2024    vit C/E/Zn/coppr/lutein/zeaxan (PRESERVISION AREDS-2 ORAL) Take 1 tablet by mouth once daily.   2/1/2024 at am     Penicillins  Social History     Tobacco Use    Smoking status: Never    Smokeless tobacco: Never   Substance Use Topics    Alcohol use: Never    Drug use: Never     Family History   Problem Relation Name Age of Onset    Hypertension Mother      Heart attack Mother             Review of Systems:  Review of systems was completed and unless documented above, all other systems were negative for major complaints.        Objective   Vitals:  Most Recent:  Vitals:    02/02/24 0800   BP: 123/74   Pulse: 68   Resp: 18   Temp: 36.3 °C (97.3 °F)   SpO2: 95%       24hr Min/Max:  Temp  Min: 36.3 °C (97.3 °F)  Max: 37.1 °C (98.8 °F)  Pulse  Min: 64  Max: 81  BP  Min: 118/68  Max: 138/90  Resp  Min: 18  Max: 24  SpO2  Min: 92 %  Max: 98 %    Hemodynamic parameters for last 24 hours:       Lab/Radiology/Diagnostic Review:  Results for orders placed or performed during the hospital encounter of 02/01/24 (from the past 24 hour(s))   Troponin I, High Sensitivity   Result Value Ref Range    Troponin I, High Sensitivity 25 (H) 0 - 20 ng/L    Sars-CoV-2 and Influenza A/B PCR   Result Value Ref Range    Flu A Result Not Detected Not Detected    Flu B Result Not Detected Not Detected    Coronavirus 2019, PCR Not Detected Not Detected   RSV PCR   Result Value Ref Range    RSV PCR Not Detected Not Detected   TSH   Result Value Ref Range    Thyroid Stimulating Hormone 2.00 0.44 - 3.98 mIU/L   ECG 12 lead   Result Value Ref Range    Ventricular Rate 88 BPM    Atrial Rate 88 BPM    AL Interval 192 ms    QRS Duration 82 ms    QT Interval 374 ms    QTC Calculation(Bazett) 452 ms    P Axis 38 degrees    R Axis -20 degrees    T Axis 48 degrees    QRS Count 15 beats    Q Onset 221 ms    P Onset 125 ms    P Offset 182 ms    T Offset 408 ms    QTC Fredericia 425 ms   Sedimentation rate, automated   Result Value Ref Range    Sedimentation Rate 31 (H) 0 - 20 mm/h   Magnesium   Result Value Ref Range    Magnesium 2.05 1.60 - 2.40 mg/dL   Comprehensive Metabolic Panel   Result Value Ref Range    Glucose 109 (H) 74 - 99 mg/dL    Sodium 139 136 - 145 mmol/L    Potassium 4.1 3.5 - 5.3 mmol/L    Chloride 106 98 - 107 mmol/L    Bicarbonate 27 21 - 32 mmol/L    Anion Gap 10 10 - 20 mmol/L    Urea Nitrogen 31 (H) 6 - 23 mg/dL    Creatinine 1.56 (H) 0.50 - 1.30 mg/dL    eGFR 41 (L) >60 mL/min/1.73m*2    Calcium 8.2 (L) 8.6 - 10.3 mg/dL    Albumin 3.4 3.4 - 5.0 g/dL    Alkaline Phosphatase 36 33 - 136 U/L    Total Protein 5.8 (L) 6.4 - 8.2 g/dL    AST 10 9 - 39 U/L    Bilirubin, Total 0.5 0.0 - 1.2 mg/dL    ALT 9 (L) 10 - 52 U/L   Lavender Top   Result Value Ref Range    Extra Tube Hold for add-ons.    Troponin I, High Sensitivity   Result Value Ref Range    Troponin I, High Sensitivity 24 (H) 0 - 20 ng/L   Transthoracic Echo (TTE) Limited   Result Value Ref Range    LV biplane EF 39 %    LVOT diam 2.10 cm    MV E/A ratio 1.30     MV avg E/e' ratio 27.37     Tricuspid annular plane systolic excursion 2.0 cm    RV free wall pk S' 8.16 cm/s    RVSP 66.0 mmHg    LVIDd 5.10 cm    LV  A4C EF 38.0      ECG 12 lead    Result Date: 2/2/2024  Sinus rhythm with Premature atrial complexes Moderate voltage criteria for LVH, may be normal variant Borderline ECG When compared with ECG of 01-FEB-2024 12:49, (unconfirmed) Premature atrial complexes are now Present Confirmed by Avtar Cm ([id number]) on 2/2/2024 1:26:52 PM    ECG 12 lead    Result Date: 2/2/2024  Sinus rhythm with marked sinus arrhythmia Moderate voltage criteria for LVH, may be normal variant Borderline ECG When compared with ECG of 24-DEC-2023 15:58, (unconfirmed) Sinus rhythm has replaced Atrial fibrillation Non-specific change in ST segment in Lateral leads Confirmed by Avtar Cm ([id number]) on 2/2/2024 1:25:38 PM    Electrocardiogram, 12-lead PRN ACS symptoms    Result Date: 2/2/2024  Sinus rhythm with marked sinus arrhythmia Moderate voltage criteria for LVH, may be normal variant Borderline ECG When compared with ECG of 24-DEC-2023 15:58, (unconfirmed) Sinus rhythm has replaced Atrial fibrillation Non-specific change in ST segment in Lateral leads    Transthoracic Echo (TTE) Limited    Result Date: 2/2/2024            [facility] [id number] [address]    Tel [phone] Fax [phone] TRANSTHORACIC ECHOCARDIOGRAM REPORT  Patient Name:      MICHELLE Nuñez Physician:   [id number] Armando Ricks MD Study Date:        2/2/2024            Ordering Provider:   [id number] TIFFANY JORDAN MRN/PID:           [id number]            Fellow: Accession#:        [id number]        Nurse: Date of Birth/Age: 1/10/1931 / 89      Sonographer:         Ana Laura Dalton RDCS                    years Gender:            M                   Additional Staff: Height:            167.64 cm           Admit Date:          2/1/2024 Weight:            72.12 kg            Admission Status:    Inpatient - Routine BSA:               1.81 m2             Department Location: [facility]  Blood Pressure: 131 /69 mmHg Study Type:    TRANSTHORACIC ECHO (TTE) LIMITED Diagnosis/ICD: Chest pain, unspecified-R07.9; Acute on chronic diastolic                (congestive) heart failure (CHF)-I50.33 Indication:    CP, CHF CPT Codes:     Echo Limited-03190  Study Detail: The following Echo studies were performed: 2D, M-Mode, Doppler and               color flow.  PHYSICIAN INTERPRETATION: Left Ventricle: Left ventricular systolic function is moderately decreased, with an estimated ejection fraction of 35-40%. There is global hypokinesis of the left ventricle with minor regional variations. The left ventricular cavity size is normal. Spectral Doppler shows a pseudonormal pattern of left ventricular diastolic filling. Left Atrium: The left atrium was not assessed. Right Ventricle: The right ventricle is normal in size. There is normal right ventricular global systolic function. Right Atrium: The right atrium was not assessed. Aortic Valve: The aortic valve is probably trileaflet. There is moderate aortic valve cusp calcification. There is moderate aortic valve thickening. There is evidence of severe aortic valve stenosis. There is trivial aortic valve regurgitation. Probably severe AS. Recommend additional measurements if clinically indicated. Mitral Valve: The mitral valve is normal in structure. There is moderate mitral valve regurgitation. Tricuspid Valve: The tricuspid valve is structurally normal. There is moderate tricuspid regurgitation. The Doppler estimated RVSP is severely elevated at 66.0 mmHg. Pulmonic Valve: The pulmonic valve is structurally normal. There is no indication of pulmonic valve regurgitation. Pericardium: There is no pericardial effusion noted. Aorta: The aortic root was not assessed. Systemic Veins: The inferior vena cava appears moderately dilated. In comparison to the previous echocardiogram(s): Prior examinations are available and were reviewed for comparison purposes. Compared to  10/2023 study the LVEF is worse.  CONCLUSIONS:  1. Left ventricular systolic function is moderately decreased with a 35-40% estimated ejection fraction.  2. Spectral Doppler shows a pseudonormal pattern of left ventricular diastolic filling.  3. Moderate mitral valve regurgitation.  4. Moderate tricuspid regurgitation.  5. Severely elevated right ventricular systolic pressure.  6. Probably severe AS. Recommend additional measurements if clinically indicated.  7. Severe aortic valve stenosis.  8. There is moderate aortic valve cusp calcification.  9. The inferior vena cava appears moderately dilated. 10. Compared to 10/2023 study the LVEF is worse. 11. There is global hypokinesis of the left ventricle with minor regional variations. QUANTITATIVE DATA SUMMARY: 2D MEASUREMENTS:                           Normal Ranges: IVSd:          1.10 cm    (0.6-1.1cm) LVPWd:         1.00 cm    (0.6-1.1cm) LVIDd:         5.10 cm    (3.9-5.9cm) LVIDs:         4.00 cm LV Mass Index: 110.7 g/m2 LV % FS        21.6 % LV SYSTOLIC FUNCTION BY 2D PLANIMETRY (MOD):                     Normal Ranges: EF-A4C View: 38.0 % (>=55%) EF-A2C View: 38.9 % EF-Biplane:  38.9 % LV DIASTOLIC FUNCTION:                        Normal Ranges: MV Peak E:    1.46 m/s (0.7-1.2 m/s) MV Peak A:    1.12 m/s (0.42-0.7 m/s) E/A Ratio:    1.30     (1.0-2.2) MV e'         0.05 m/s (>8.0) MV lateral e' 0.06 m/s MV medial e'  0.05 m/s E/e' Ratio:   27.37    (<8.0) MITRAL VALVE:                 Normal Ranges: MV DT: 174 msec (150-240msec) AORTIC VALVE:                         Normal Ranges: LVOT Max Wilner:  0.86 m/s (<=1.1m/s) LVOT VTI:      18.70 cm LVOT Diameter: 2.10 cm  (1.8-2.4cm)  RIGHT VENTRICLE: TAPSE: 20.3 mm RV s'  0.08 m/s TRICUSPID VALVE/RVSP:                             Normal Ranges: Peak TR Velocity: 3.97 m/s RV Syst Pressure: 66.0 mmHg (< 30mmHg)  [id number] Armando Ricks MD Electronically signed on 2/2/2024 at 10:28:08 AM  ** Final **     CT chest abdomen  pelvis wo IV contrast    Result Date: 2/1/2024  Interpreted By:  Noa Kwon, STUDY: CT CHEST ABDOMEN PELVIS WO CONTRAST;  2/1/2024 5:02 pm   INDICATION: Signs/Symptoms:chest pain, c/f free air on CXR.   COMPARISON: Chest x-ray performed on this day.   ACCESSION NUMBER(S): [id number]   ORDERING CLINICIAN: MICKEY HONEYCUTT   TECHNIQUE: CT of the chest, abdomen and pelvis was performed. Contiguous axial images were obtained at 3 mm slice thickness through the chest, abdomen and pelvis. Coronal and sagittal reconstructions at 3 mm slice thickness were performed.  No intravenous contrast was administered; positive oral contrast was given.   FINDINGS: Please note that the study is limited without intravenous contrast. The scan is also limited by artifacts arising from the patient's arms that are kept by his sides.   CHEST:   LUNG/PLEURA/LARGE AIRWAYS: Tracheobronchial tree this patent.  There are infiltrate/atelectasis in the posterior lower lobes. No pleural effusion. No suspicious pulmonary nodules..   VESSELS: Mild dilatation of the ascending aorta measured at 4.3 cm. Pulmonary arteries normal in caliber. Dense three-vessel coronary arterial calcifications. The scan is not optimized for coronary calcium scoring.   HEART: Heart is moderately enlarged. No pericardial effusion.   MEDIASTINUM AND GLORIA: No mediastinal lymphadenopathy.  No large hilar lymph nodes on this nonenhanced scan. Esophagus is unremarkable   CHEST WALL AND LOWER NECK: Severe thoracic kyphosis.. Thyroid is not clearly visualized.   ABDOMEN:   LIVER: Unremarkable.   BILE DUCTS: Unremarkable.   GALLBLADDER: Tiny gallstones but with no signs of acute cholecystitis..   PANCREAS: Atrophic pancreas.   SPLEEN: Unremarkable.   ADRENAL GLANDS: Unremarkable.   KIDNEYS AND URETERS: Normal in size. Multiple bilateral cysts measured up to 7 cm. No hydronephrosis. No urinary tract calculi or obstruction.   PELVIS:   BLADDER: Unremarkable.   REPRODUCTIVE ORGANS:  Brachytherapy seeds in the prostate. No pelvic masses.   BOWEL: Colon interposition anteriorly to liver which is responsible for the questionable free air on the recent chest radiograph. Stomach and intestine are otherwise unremarkable. No intestinal wall thickening. Appendix is identified..   VESSELS: Arteriosclerotic changes. Abdominal aorta and IVC are normal in caliber.   PERITONEUM/RETROPERITONEUM/LYMPH NODES: No evidence of lymphadenopathy. Retroperitoneal is unremarkable. No free fluid or free air.   ABDOMINAL WALL: A small right inguinal hernia which contains fat and fluid.   BONES: Unremarkable. Grade 1 anterolisthesis of C4-C5. No suspicious bone lesions.       Chest 1. Infiltrate/atelectasis in the posterior lower lobes. Rule out pneumonia. No pleural effusion. 2. Mild dilatation of the ascending aorta measured at 4.3 cm in diameter. 3. Exaggerated thoracic kyphosis. 4. Cardiomegaly.   Abdomen-Pelvis 1.  No evidence of free air. Colon interposition anteriorly to the liver which corresponds to the findings noted on the recent chest radiograph. 2. Multiple renal cysts. No hydronephrosis. 3. Tiny gallstones but with no signs of acute cholecystitis 4. A small right inguinal hernia containing fat and fluid. 5. Brachytherapy seeds in the prostate.     Signed by: Noa Kwon 2/1/2024 5:59 PM Dictation workstation:   [id number]    XR chest 1 view    Result Date: 2/1/2024  Interpreted By:  Eze Ashley, STUDY: XR CHEST 1 VIEW;  2/1/2024 2:03 pm   INDICATION: Signs/Symptoms:Chest Pain.   COMPARISON: 12/24/2023   ACCESSION NUMBER(S): [id number]   ORDERING CLINICIAN: STEFANIA MACKEY   FINDINGS: Low lung volumes with vascular crowding. Borderline heart size. Atherosclerosis. Diffuse interstitial pulmonary opacities suggestive of pulmonary edema. Questionable small left pleural effusion. Gas seen deep to the right hemidiaphragm, free air versus bowel gas.       Gas deep to the right hemidiaphragm, free air  versus bowel gas. Consider follow-up with decubitus radiographs and/or CT of the abdomen and pelvis as warranted.   Cardiomegaly and pulmonary edema.   Eze Ashley discussed the significance and urgency of this critical finding by telephone with  STEFANIA MACKEY on 2/1/2024 at 2:28 pm.  (**-RCF-**) Findings:  See findings.     Signed by: Eze Ashley 2/1/2024 2:29 PM Dictation workstation:   [id number]       Intake/Output:  No intake or output data in the 24 hours ending 02/02/24 1411    Physical exam:    Physical Exam  Constitutional:       Appearance: He is normal weight. He is ill-appearing.      Comments: On 3 L NC, patient is hard of hearing   HENT:      Head: Normocephalic and atraumatic.      Nose: Nose normal.      Mouth/Throat:      Mouth: Mucous membranes are moist.   Cardiovascular:      Rate and Rhythm: Normal rate. Rhythm irregular.      Pulses: Normal pulses.      Heart sounds: Murmur heard.      Gallop present.      Comments: Ejection systolic murmur at the aortic area with pansystolic murmur at the apex  Pulmonary:      Effort: No respiratory distress.      Breath sounds: Wheezing, rhonchi and rales present.      Comments: Increase effort of breathing  Abdominal:      General: Abdomen is flat. Bowel sounds are normal. There is no distension.      Palpations: Abdomen is soft.      Tenderness: There is no abdominal tenderness.   Musculoskeletal:         General: Normal range of motion.      Cervical back: Normal range of motion.      Right lower leg: Edema present.      Left lower leg: Edema present.   Skin:     General: Skin is warm.      Capillary Refill: Capillary refill takes less than 2 seconds.   Neurological:      General: No focal deficit present.      Mental Status: He is alert and oriented to person, place, and time. Mental status is at baseline.   Psychiatric:         Mood and Affect: Mood normal.         Behavior: Behavior normal.         Thought Content: Thought content  normal.         Judgment: Judgment normal.            Assessment /Plan    Principal Problem:    Acute on chronic diastolic heart failure (CMS/HCC)  Active Problems:    Aortic stenosis    Dyspnea on exertion    Gastroesophageal reflux disease    Hard of hearing    Malignant neoplasm of prostate (CMS/HCC)    Chronic respiratory failure with hypoxia (CMS/HCC)    Essential hypertension    Acute on chronic congestive heart failure, unspecified heart failure type (CMS/HCC)    Acute on chronic diastolic heart failure   Severe aortic stenosis  Type II MI  New HFrEF with EF 35 to 40%  Troponinemia secondary to demand ischemia  Pulmonary hypertension  Stable ascending aorta dilatation  -Repeat echo showed significantly LVEF 35 to 40%, moderate MR, moderate TR, severely elevated RVSP, severe AS, global hypokinesia of the left ventricle with minor regional variation.  Plan  - Oxygen by nasal canula to keep saturation > 92%.  - Patient received so far home dose torsemide, 1 dose of Lasix 20, added another dose of Lasix 20.  - Daily weight recording to monitor diuresis.  - Start GDMT with metoprolol succinate 25 mg, while resuming home dose losartan.  Will hold starting SGLT2 inhibitor in the setting of CKD for now.  - Will reassess the need for diagnostic angiography after discussion with Dr. Mauro.  - Strict monitor of input and output.  - Telemetry unit.  - Daily BMP to monitor creatinine and electrolytes.  - Outpatient follow up with cardiology after discharge.  - 2g Sodium diet, and 1L fluid restriction    Will continue to follow  Thank you for the opportunity to participate in the care of your patient.        This assessment and plan has been discussed with the senior resident and attending physician.  Aneesh Kyle MD   Internal Medicine, PGY-1 .

## 2024-02-02 NOTE — PROGRESS NOTES
"Nutrition Initial Assessment:   Nutrition Assessment    Reason for Assessment: Provider consult order    Patient is a 89 y.o. male presenting from home w/wife for acute on chronic diastolic heart failure. Pt family at bedside. Cardiology on consult.       Past Medical History  HFpEF (EF: 50 to 55% October 2023), COPD (baseline- 2 L NC), dyslipidemia, CKD (baseline CR: 1.8), COPD, anemia, pulmonary hypertension, prostate cancer and moderate to severe aortic stenosis     has a past medical history of Pain in toes of both feet (02/02/2024), Pain of toe (02/02/2024), and Personal history of malignant neoplasm of prostate (03/22/2021).  Surgical History   has a past surgical history that includes Other surgical history (03/22/2021).    Nutrition History:  Energy Intake: Good > 75 %  Food and Nutrient History: Pt w/low sodium diet at home PTA. Pt eats a late breakfast (9:30-10am) of eggs or corn flakes w/fruit or instant hot cereal. Pt has a late dinner (7-8pm) often a frozen meal since pt can no longer prepare meals. Pt wife is also unable to cook. Pt has family locally and they will bring takeout food or pt will order pizza, etc a couple times a month. Pt is sleeping more and more per family. Pt does not add salt to foods.  Vitamin/Herbal Supplement Use: no oral nutrition supplements.  Food Allergies/Intolerances:  None  GI Symptoms: None  Oral Problems: None       Anthropometrics:  Height: 167.6 cm (5' 6\")   Weight: 72.5 kg (159 lb 12.8 oz)   BMI (Calculated): 25.8             Weight History:     Weight Change %:  Weight History / % Weight Change: per archives 11/17/22 168#, 5/31/23 162#. Per pt 153# last week. 8.9% wt loss x <3 months using pt wt.  Significant Weight Loss: Yes  Interpretation of Weight Loss: >7.5% in 3 months    Nutrition Focused Physical Exam Findings:    Subcutaneous Fat Loss:   Orbital Fat Pads: Mild-Moderate (slight dark circles and slight hollowing)  Buccal Fat Pads: Well nourished (full, rounded " "cheeks)  Triceps: Well nourished (ample fat tissue)  Muscle Wasting:  Temporalis: Mild-Moderate (slight depression)  Pectoralis (Clavicular Region): Well nourished (clavicle not visible)  Quadriceps: Well nourished (well developed, well rounded)  Gastrocnemius: Well nourished (well developed bulbous muscle)  Edema:  Edema: +2 mild  Edema Location: BLLE  Physical Findings:  Skin: Negative (WNL)    Nutrition Significant Labs:  CBC Trend:   Results from last 7 days   Lab Units 02/01/24  1310   WBC AUTO x10*3/uL 12.7*   RBC AUTO x10*6/uL 3.53*   HEMOGLOBIN g/dL 9.9*   HEMATOCRIT % 32.6*   MCV fL 92   PLATELETS AUTO x10*3/uL 143*    , BMP Trend:   Results from last 7 days   Lab Units 02/02/24  0815 02/01/24  1310   GLUCOSE mg/dL 109* 195*   CALCIUM mg/dL 8.2* 8.5*   SODIUM mmol/L 139 141   POTASSIUM mmol/L 4.1 4.3   CO2 mmol/L 27 27   CHLORIDE mmol/L 106 104   BUN mg/dL 31* 30*   CREATININE mg/dL 1.56* 1.57*    , A1C:No results found for: \"HGBA1C\", BG POCT trend:    , Liver Function Trend:   Results from last 7 days   Lab Units 02/02/24  0815 02/01/24  1310   ALK PHOS U/L 36 44   AST U/L 10 12   ALT U/L 9* 10   BILIRUBIN TOTAL mg/dL 0.5 0.7    , Renal Lab Trend:   Results from last 7 days   Lab Units 02/02/24  0815 02/01/24  1310   POTASSIUM mmol/L 4.1 4.3   SODIUM mmol/L 139 141   MAGNESIUM mg/dL 2.05  --    EGFR mL/min/1.73m*2 41* 41*   BUN mg/dL 31* 30*   CREATININE mg/dL 1.56* 1.57*    , TPN/PPN Labs:   Results from last 7 days   Lab Units 02/02/24  0815 02/01/24  1310   GLUCOSE mg/dL 109* 195*   POTASSIUM mmol/L 4.1 4.3   MAGNESIUM mg/dL 2.05  --    SODIUM mmol/L 139 141   CHLORIDE mmol/L 106 104   ALT U/L 9* 10   AST U/L 10 12   ALK PHOS U/L 36 44   BILIRUBIN TOTAL mg/dL 0.5 0.7    , Lipid Panel:   Lab Results   Component Value Date    CHOL 116 03/29/2022    HDL 51.0 03/29/2022    CHHDL 2.3 03/29/2022    LDLF 50 03/29/2022    VLDL 15 03/29/2022    TRIG 77 03/29/2022    , Vit D:   Lab Results   Component Value Date " "   VITD25 63 10/30/2023    , Vit B12:   Lab Results   Component Value Date    UHBRYVNC23 980 (H) 12/22/2022    , Iron Panel:   Lab Results   Component Value Date    FERRITIN 63 03/29/2022    , Folate: No results found for: \"FOLATE\"     Nutrition Specific Medications:  Scheduled medications  amLODIPine, 10 mg, oral, Daily  aspirin, 81 mg, oral, Nightly  budesonide, 0.5 mg, nebulization, BID  cholecalciferol, 5,000 Units, oral, Daily  famotidine, 20 mg, oral, Nightly  ferrous sulfate (325 mg ferrous sulfate), 65 mg of iron, oral, Daily  formoterol, 20 mcg, nebulization, BID  heparin (porcine), 5,000 Units, subcutaneous, q8h  ipratropium, 0.5 mg, nebulization, q6h  losartan, 50 mg, oral, Daily  polyethylene glycol, 17 g, oral, Daily  potassium chloride CR, 10 mEq, oral, Nightly  simvastatin, 40 mg, oral, Nightly  tamsulosin, 0.4 mg, oral, Daily      Continuous medications     PRN medications  PRN medications: acetaminophen, albuterol, diclofenac sodium, oxygen    Pain Score: 5 - Moderate pain     I/O:    ;          Dietary Orders (From admission, onward)       Start     Ordered    02/02/24 1248  Adult diet Cardiac; 70 gm fat; 2 - 3 grams Sodium  Diet effective now        Question Answer Comment   Diet type Cardiac    Fat restriction: 70 gm fat    Sodium restriction: 2 - 3 grams Sodium        02/02/24 1247    02/01/24 2054  May Participate in Room Service  Once        Question:  .  Answer:  Yes    02/01/24 2053                     Estimated Needs:   Total Energy Estimated Needs (kCal):  (9736-3307 (23-26kcal/kg))     Total Protein Estimated Needs (g):  (72-86 (1.0-1.2g/kg))     Total Fluid Estimated Needs (mL):  (1mL/kcal or per Cardiology)           Nutrition Diagnosis   Malnutrition Diagnosis  Patient has Malnutrition Diagnosis: No    Nutrition Diagnosis  Patient has Nutrition Diagnosis: Yes  Diagnosis Status (1): New  Nutrition Diagnosis 1: Unintended weight loss  Related to (1): suspected fluid fluctuation in " "setting of CHF  As Evidenced by (1): 8.9% wt loss per pt x <3 months.       Nutrition Interventions/Recommendations         Nutrition Prescription:  Individualized Nutrition Prescription Provided for : Diet: continue with Cardiac, 70g fat, 2-3gm sodium diet as ordered.        Nutrition Interventions:   Food and/or Nutrient Delivery Interventions  Interventions: Meals and snacks  Meals and Snacks: Fat-modified diet, Mineral-modified diet  Goal: will consume 75% of meals.    Coordination of Nutrition Care by a Nutrition Professional  Collaboration and Referral of Nutrition Care: Collaboration by nutrition professional with other providers  Goal: NATY Basilio    Nutrition Education:   Education Documentation  Nutrition Care Manual, taught by Sharla Anderson RD at 2/2/2024  3:02 PM.  Learner: Family, Patient  Readiness: Acceptance  Method: Explanation, Handout  Response: Verbalizes Understanding  Comment: Provided AND handout from [facility] \"Heart Failure Nutrition Therapy\" and reviewed w/pt and family. Discussed MOW (therapeutic w/MD Rx) for low sodium meals. Encouraged label reading and suggestions for lower sodium alternatives.      Pt completes daily wts and reinforced tracking on a calendar.   Provided pt w/a copy of the AYR menu w/sodium content of foods listed.       Nutrition Monitoring and Evaluation   Food/Nutrient Related History Monitoring  Monitoring and Evaluation Plan: Energy intake  Energy Intake: Estimated energy intake  Criteria: will consume 75% of estimated energy needs.    Body Composition/Growth/Weight History  Monitoring and Evaluation Plan: Weight change  Weight: Measured weight, Weight change  Weight Change: Weight loss  Criteria: achieve decrease in edema.    Biochemical Data, Medical Tests and Procedures  Monitoring and Evaluation Plan: Electrolyte/renal panel  Electrolyte and Renal Panel: Sodium, Potassium, Magnesium, Phosphorus, Creatinine, BUN  Criteria: WNL            Time Spent/Follow-up " Reminder:   Time Spent (min): 60 minutes  Follow up: Provided inpatient RDN contact information  Last Date of Nutrition Visit: 02/02/24  Nutrition Follow-Up Needed?: 3-8 days  Follow up Comment: JARON

## 2024-02-02 NOTE — PROGRESS NOTES
Physical Therapy    Physical Therapy Evaluation    Patient Name: Michael Sandra  MRN: [id number]  Today's Date: 2/2/2024   Time Calculation  Start Time: 0905  Stop Time: 0915  Time Calculation (min): 10 min    Assessment/Plan   PT Assessment  PT Assessment Results: Decreased strength, Decreased range of motion, Decreased endurance, Impaired balance, Decreased mobility, Impaired hearing, Pain  Rehab Prognosis: Good  Medical Staff Made Aware: Yes  End of Session Communication: Bedside nurse  Assessment Comment: Pt's impairments include generalized weakness, impaired balance and decreased activity tolerance. Pt's functional limitations include bed mobility, transfers, gait and elevations. Pt would benefit from continued acute care PT during hospital LOS and upon discharge at a low level intensity.  End of Session Patient Position: Up in chair, Alarm on  IP OR SWING BED PT PLAN  Inpatient or Swing Bed: Inpatient  PT Plan  Treatment/Interventions: Bed mobility, Transfer training, Gait training, Stair training, Neuromuscular re-education, Balance training, Strengthening, Endurance training, Range of motion, Therapeutic exercise, Therapeutic activity, Home exercise program, Positioning, Postural re-education  PT Plan: Skilled PT  PT Frequency: 3 times per week  PT Discharge Recommendations: Low intensity level of continued care  Equipment Recommended upon Discharge: Wheeled walker  PT Recommended Transfer Status: Assist x1, Assistive device  PT - OK to Discharge: Yes (Pt is ok to discharge from acute care PT to next level of care once cleared by medical team.)      Subjective   General Visit Information:  General  Reason for Referral: Acute on chronic diastolic heart failure, NPO for possible stress test, plan to rule out myocarditis/pericarditis.  Referred By: Gracy Christiansen MD  Past Medical History Relevant to Rehab:   Past Medical History:   Diagnosis Date    Personal history of malignant neoplasm of prostate 03/22/2021     History of malignant neoplasm of prostate     Family/Caregiver Present: No  Co-Treatment: OT  Co-Treatment Reason: safety to maximize functional mobility  Home Living:  Home Living  Type of Home: House  Lives With: Spouse  Home Adaptive Equipment: Cane  Home Layout: One level  Prior Level of Function:  Prior Function Per Pt/Caregiver Report  Prior Function Comments: IND with SPC for mobility.  Precautions:  Precautions  Medical Precautions: Fall precautions      Objective   Pain:  Pain Assessment  Pain Assessment: 0-10  Pain Score: 7  Pain Type: Acute pain  Pain Location: Chest  Pain Orientation: Mid, Left  Cognition:  Cognition  Overall Cognitive Status: Within Functional Limits ([place])    General Assessments:  Activity Tolerance  Endurance: Tolerates less than 10 min exercise, no significant change in vital signs    Static Sitting Balance  Static Sitting-Comment/Number of Minutes: SBA at EOB  Dynamic Sitting Balance  Dynamic Sitting-Comments: SBA to scoot    Static Standing Balance  Static Standing-Comment/Number of Minutes: CGA with R HHA  Dynamic Standing Balance  Dynamic Standing-Comments: MinAx1 with R HHA to amb bed to chair 1x4'.  Functional Assessments:  Bed Mobility  Bed Mobility: Yes  Bed Mobility 1  Bed Mobility 1: Supine to sitting  Level of Assistance 1: Minimum assistance  Bed Mobility Comments 1: to exit R side of bed. Pt reaches for therapist UE to exit bed.    Transfers  Transfer: Yes  Transfer 1  Technique 1: Sit to stand  Transfer Level of Assistance 1: Minimum assistance  Trials/Comments 1: x1 trial Sit>stand from EOB with R HHA in standing. Mild retropulsion with tactile cues to correct.    Ambulation/Gait Training  Ambulation/Gait Training Performed: Yes  Ambulation/Gait Training 1  Surface 1: Level tile  Comments/Distance (ft) 1: Pt amb 1x4' with R HHA, CGA-MinAx1 with reciprocal gait, short step length, decreased gait speed, no pathway deviation or overt LOB episodes. Mild retropulsion with  tactile cues to correct. Pt denies dizziness or lightheadedness during mobility.  Extremity/Trunk Assessments:  RLE   RLE : Exceptions to WFL  Strength RLE  R Hip Flexion: 3/5  R Knee Extension: 3/5  R Ankle Dorsiflexion: 5/5  LLE   LLE : Exceptions to WFL  Strength LLE  L Hip Flexion: 3/5  L Knee Extension: 3/5  L Ankle Dorsiflexion: 5/5  Outcome Measures:  [facility] Basic Mobility  Turning from your back to your side while in a flat bed without using bedrails: None  Moving from lying on your back to sitting on the side of a flat bed without using bedrails: A little  Moving to and from bed to chair (including a wheelchair): A little  Standing up from a chair using your arms (e.g. wheelchair or bedside chair): A little  To walk in hospital room: A little  Climbing 3-5 steps with railing: A little  Basic Mobility - Total Score: 19    Encounter Problems       Encounter Problems (Active)       PT Problem       Bed mobility (Progressing)       Start:  02/02/24    Expected End:  02/16/24       Pt will perform supine<>sit with HOB flat, MARTIN.           Transfers (Progressing)       Start:  02/02/24    Expected End:  02/16/24       Pt will perform all transfers with LRAD, MARTIN.            Gait (Progressing)       Start:  02/02/24    Expected End:  02/16/24       Pt will amb 150' with LRAD, MARTIN with reciprocal gait, upright posture and improved activity tolerance as demonstrated by vitals.           Balance (Progressing)       Start:  02/02/24    Expected End:  02/16/24       Pt will tolerate standing x2 min without BUE support, SBAx1.           Strength (Progressing)       Start:  02/02/24    Expected End:  02/16/24       Pt will improve gross BLE strength to 4-/5.                  Education Documentation  Mobility Training, taught by Natalya Abreu, PT at 2/2/2024  9:58 AM.  Learner: Patient  Readiness: Acceptance  Method: Explanation  Response: Verbalizes Understanding, Demonstrated Understanding    Education  Comments  No comments found.

## 2024-02-02 NOTE — HOSPITAL COURSE
"89 year old man with known past medical history of HFpEF 50 to 55% October 2023, COPD on 2 L via NC, prostate cancer, moderate to severe aortic stenosis presents to the emergency department with his son with concern for chest pain that developed while watching television (at rest), left-sided radiating to back. Endorsed compliance with his medications including torsemide 10 mg daily, despite having worsening edema develop; chest pain is worse with deep inspiration and positional changes.  He does report prodromal \"cold\" symptoms that resolved about 3 to 4 days prior to admission.  He denies associated nausea, vomiting, headache, blurry vision, double vision, abdominal pain, diarrhea, constipation, urinary symptoms.     During the patient's hospitalization, he was optimized medically by continuing metoprolol succinate and farxiga with addition of Entresto; his hospital course was complicated by anemia that resolved after a unit of packed red blood cells transfusion; this all was done in conjunction with cardiology consultation, the patient will follow-up with their office in 2 to 4 weeks after discharge, he will follow-up with his primary care physician within 1 week of discharge from the hospital; during the hospitalization he met with hospice of the [facility], although ultimately decided to be discharged to [facility] for SNF and being enrolled in the palliative care navigator program.    Greater than 30 minutes was spent facilitating this patients discharge from the hospital which included examining the patient, reconciling medications, and making arrangements for future care.  Dion Mauro MD  [facility] [place]  [facility] Medicine    This document was generated in whole or in part using the Dragon One medical voice recognition software and there may be some incorrect words/wording, spelling, or punctuation errors that were not corrected prior to finalization in the medical record.      "

## 2024-02-02 NOTE — PROGRESS NOTES
"José Antonio Sandra \"Michael\" is a 89 y.o. male on day 1 of admission presenting with Acute on chronic diastolic heart failure (CMS/HCC).    Subjective   Patient seen and examined  Resting in bed, NAD  Has reproducible left sided chest discomfort with palpation  Denies any shortness of breath  On his baseline 02 requirements      Objective     Physical Exam  General: well appearing, no acute distress  HEENT:  moist mucous membranes  CV: regular rate and rhythm, aortic crescendo decrescendo murmur, 2+ pulses in all extremities  RESP: Crackles   Abd: soft, nontender, nondistended  Neuro: alert and oriented x3, speech appropriate  Vascular: BL LE +3 peripheral edema appreciated  Skin: no rashes  MSK: no joint swelling    Last Recorded Vitals  Blood pressure 123/74, pulse 68, temperature 36.3 °C (97.3 °F), resp. rate 18, height 1.676 m (5' 6\"), weight 72.5 kg (159 lb 12.8 oz), SpO2 95 %.  Intake/Output last 3 Shifts:  No intake/output data recorded.    Relevant Results  Scheduled medications  amLODIPine, 10 mg, oral, Daily  aspirin, 81 mg, oral, Nightly  budesonide, 0.5 mg, nebulization, BID  cholecalciferol, 5,000 Units, oral, Daily  famotidine, 20 mg, oral, Nightly  ferrous sulfate (325 mg ferrous sulfate), 65 mg of iron, oral, Daily  formoterol, 20 mcg, nebulization, BID  heparin (porcine), 5,000 Units, subcutaneous, q8h  ipratropium, 0.5 mg, nebulization, q6h  losartan, 50 mg, oral, Daily  polyethylene glycol, 17 g, oral, Daily  potassium chloride CR, 10 mEq, oral, Nightly  simvastatin, 40 mg, oral, Nightly  tamsulosin, 0.4 mg, oral, Daily      Continuous medications     PRN medications  PRN medications: acetaminophen, albuterol, diclofenac sodium, oxygen  Results from last 7 days   Lab Units 02/01/24  1310   WBC AUTO x10*3/uL 12.7*   RBC AUTO x10*6/uL 3.53*   HEMOGLOBIN g/dL 9.9*     Results from last 7 days   Lab Units 02/02/24  0815 02/01/24  1310   SODIUM mmol/L 139 141   POTASSIUM mmol/L 4.1 4.3   CHLORIDE mmol/L " 106 104   CO2 mmol/L 27 27   BUN mg/dL 31* 30*   CREATININE mg/dL 1.56* 1.57*   CALCIUM mg/dL 8.2* 8.5*   MAGNESIUM mg/dL 2.05  --    BILIRUBIN TOTAL mg/dL 0.5 0.7   ALT U/L 9* 10   AST U/L 10 12       ECG 12 lead    Result Date: 2/2/2024  Sinus rhythm with Premature atrial complexes Moderate voltage criteria for LVH, may be normal variant Borderline ECG When compared with ECG of 01-FEB-2024 12:49, (unconfirmed) Premature atrial complexes are now Present Confirmed by Avtar Cm ([id number]) on 2/2/2024 1:26:52 PM    ECG 12 lead    Result Date: 2/2/2024  Sinus rhythm with marked sinus arrhythmia Moderate voltage criteria for LVH, may be normal variant Borderline ECG When compared with ECG of 24-DEC-2023 15:58, (unconfirmed) Sinus rhythm has replaced Atrial fibrillation Non-specific change in ST segment in Lateral leads Confirmed by Avtar Cm ([id number]) on 2/2/2024 1:25:38 PM    Electrocardiogram, 12-lead PRN ACS symptoms    Result Date: 2/2/2024  Sinus rhythm with marked sinus arrhythmia Moderate voltage criteria for LVH, may be normal variant Borderline ECG When compared with ECG of 24-DEC-2023 15:58, (unconfirmed) Sinus rhythm has replaced Atrial fibrillation Non-specific change in ST segment in Lateral leads    Transthoracic Echo (TTE) Limited    Result Date: 2/2/2024            [facility] [address] [facility] [address]    Tel [phone] Fax [phone] TRANSTHORACIC ECHOCARDIOGRAM REPORT  Patient Name:      MICHELLE Nuñez Physician:   [id number] Armando Ricks MD Study Date:        2/2/2024            Ordering Provider:   [id number] TIFFANY JORDAN MRN/PID:           [id number]            Fellow: Accession#:        [id number]        Nurse: Date of Birth/Age: 1/10/1931 / 89      Sonographer:         Ana Laura Dalton RDCS                    years Gender:            M                   Additional Staff: Height:            167.64 cm            Admit Date:          2/1/2024 Weight:            72.12 kg            Admission Status:    Inpatient - Routine BSA:               1.81 m2             Department Location: [facility] Echo Lab Blood Pressure: 131 /69 mmHg Study Type:    TRANSTHORACIC ECHO (TTE) LIMITED Diagnosis/ICD: Chest pain, unspecified-R07.9; Acute on chronic diastolic                (congestive) heart failure (CHF)-I50.33 Indication:    CP, CHF CPT Codes:     Echo Limited-[id number]  Study Detail: The following Echo studies were performed: 2D, M-Mode, Doppler and               color flow.  PHYSICIAN INTERPRETATION: Left Ventricle: Left ventricular systolic function is moderately decreased, with an estimated ejection fraction of 35-40%. There is global hypokinesis of the left ventricle with minor regional variations. The left ventricular cavity size is normal. Spectral Doppler shows a pseudonormal pattern of left ventricular diastolic filling. Left Atrium: The left atrium was not assessed. Right Ventricle: The right ventricle is normal in size. There is normal right ventricular global systolic function. Right Atrium: The right atrium was not assessed. Aortic Valve: The aortic valve is probably trileaflet. There is moderate aortic valve cusp calcification. There is moderate aortic valve thickening. There is evidence of severe aortic valve stenosis. There is trivial aortic valve regurgitation. Probably severe AS. Recommend additional measurements if clinically indicated. Mitral Valve: The mitral valve is normal in structure. There is moderate mitral valve regurgitation. Tricuspid Valve: The tricuspid valve is structurally normal. There is moderate tricuspid regurgitation. The Doppler estimated RVSP is severely elevated at 66.0 mmHg. Pulmonic Valve: The pulmonic valve is structurally normal. There is no indication of pulmonic valve regurgitation. Pericardium: There is no pericardial effusion noted. Aorta: The aortic root was not assessed. Systemic Veins: The  inferior vena cava appears moderately dilated. In comparison to the previous echocardiogram(s): Prior examinations are available and were reviewed for comparison purposes. Compared to 10/2023 study the LVEF is worse.  CONCLUSIONS:  1. Left ventricular systolic function is moderately decreased with a 35-40% estimated ejection fraction.  2. Spectral Doppler shows a pseudonormal pattern of left ventricular diastolic filling.  3. Moderate mitral valve regurgitation.  4. Moderate tricuspid regurgitation.  5. Severely elevated right ventricular systolic pressure.  6. Probably severe AS. Recommend additional measurements if clinically indicated.  7. Severe aortic valve stenosis.  8. There is moderate aortic valve cusp calcification.  9. The inferior vena cava appears moderately dilated. 10. Compared to 10/2023 study the LVEF is worse. 11. There is global hypokinesis of the left ventricle with minor regional variations. QUANTITATIVE DATA SUMMARY: 2D MEASUREMENTS:                           Normal Ranges: IVSd:          1.10 cm    (0.6-1.1cm) LVPWd:         1.00 cm    (0.6-1.1cm) LVIDd:         5.10 cm    (3.9-5.9cm) LVIDs:         4.00 cm LV Mass Index: 110.7 g/m2 LV % FS        21.6 % LV SYSTOLIC FUNCTION BY 2D PLANIMETRY (MOD):                     Normal Ranges: EF-A4C View: 38.0 % (>=55%) EF-A2C View: 38.9 % EF-Biplane:  38.9 % LV DIASTOLIC FUNCTION:                        Normal Ranges: MV Peak E:    1.46 m/s (0.7-1.2 m/s) MV Peak A:    1.12 m/s (0.42-0.7 m/s) E/A Ratio:    1.30     (1.0-2.2) MV e'         0.05 m/s (>8.0) MV lateral e' 0.06 m/s MV medial e'  0.05 m/s E/e' Ratio:   27.37    (<8.0) MITRAL VALVE:                 Normal Ranges: MV DT: 174 msec (150-240msec) AORTIC VALVE:                         Normal Ranges: LVOT Max Wilner:  0.86 m/s (<=1.1m/s) LVOT VTI:      18.70 cm LVOT Diameter: 2.10 cm  (1.8-2.4cm)  RIGHT VENTRICLE: TAPSE: 20.3 mm RV s'  0.08 m/s TRICUSPID VALVE/RVSP:                             Normal  Ranges: Peak TR Velocity: 3.97 m/s RV Syst Pressure: 66.0 mmHg (< 30mmHg)  [id number] Armando Ricks MD Electronically signed on 2/2/2024 at 10:28:08 AM  ** Final **     CT chest abdomen pelvis wo IV contrast    Result Date: 2/1/2024  Interpreted By:  Noa Kwon, STUDY: CT CHEST ABDOMEN PELVIS WO CONTRAST;  2/1/2024 5:02 pm   INDICATION: Signs/Symptoms:chest pain, c/f free air on CXR.   COMPARISON: Chest x-ray performed on this day.   ACCESSION NUMBER(S): [id number]   ORDERING CLINICIAN: MICKEY HONEYCUTT   TECHNIQUE: CT of the chest, abdomen and pelvis was performed. Contiguous axial images were obtained at 3 mm slice thickness through the chest, abdomen and pelvis. Coronal and sagittal reconstructions at 3 mm slice thickness were performed.  No intravenous contrast was administered; positive oral contrast was given.   FINDINGS: Please note that the study is limited without intravenous contrast. The scan is also limited by artifacts arising from the patient's arms that are kept by his sides.   CHEST:   LUNG/PLEURA/LARGE AIRWAYS: Tracheobronchial tree this patent.  There are infiltrate/atelectasis in the posterior lower lobes. No pleural effusion. No suspicious pulmonary nodules..   VESSELS: Mild dilatation of the ascending aorta measured at 4.3 cm. Pulmonary arteries normal in caliber. Dense three-vessel coronary arterial calcifications. The scan is not optimized for coronary calcium scoring.   HEART: Heart is moderately enlarged. No pericardial effusion.   MEDIASTINUM AND GLORIA: No mediastinal lymphadenopathy.  No large hilar lymph nodes on this nonenhanced scan. Esophagus is unremarkable   CHEST WALL AND LOWER NECK: Severe thoracic kyphosis.. Thyroid is not clearly visualized.   ABDOMEN:   LIVER: Unremarkable.   BILE DUCTS: Unremarkable.   GALLBLADDER: Tiny gallstones but with no signs of acute cholecystitis..   PANCREAS: Atrophic pancreas.   SPLEEN: Unremarkable.   ADRENAL GLANDS: Unremarkable.   KIDNEYS AND URETERS: Normal  in size. Multiple bilateral cysts measured up to 7 cm. No hydronephrosis. No urinary tract calculi or obstruction.   PELVIS:   BLADDER: Unremarkable.   REPRODUCTIVE ORGANS: Brachytherapy seeds in the prostate. No pelvic masses.   BOWEL: Colon interposition anteriorly to liver which is responsible for the questionable free air on the recent chest radiograph. Stomach and intestine are otherwise unremarkable. No intestinal wall thickening. Appendix is identified..   VESSELS: Arteriosclerotic changes. Abdominal aorta and IVC are normal in caliber.   PERITONEUM/RETROPERITONEUM/LYMPH NODES: No evidence of lymphadenopathy. Retroperitoneal is unremarkable. No free fluid or free air.   ABDOMINAL WALL: A small right inguinal hernia which contains fat and fluid.   BONES: Unremarkable. Grade 1 anterolisthesis of C4-C5. No suspicious bone lesions.       Chest 1. Infiltrate/atelectasis in the posterior lower lobes. Rule out pneumonia. No pleural effusion. 2. Mild dilatation of the ascending aorta measured at 4.3 cm in diameter. 3. Exaggerated thoracic kyphosis. 4. Cardiomegaly.   Abdomen-Pelvis 1.  No evidence of free air. Colon interposition anteriorly to the liver which corresponds to the findings noted on the recent chest radiograph. 2. Multiple renal cysts. No hydronephrosis. 3. Tiny gallstones but with no signs of acute cholecystitis 4. A small right inguinal hernia containing fat and fluid. 5. Brachytherapy seeds in the prostate.     Signed by: Noa Kwon 2/1/2024 5:59 PM Dictation workstation:   [id number]    XR chest 1 view    Result Date: 2/1/2024  Interpreted By:  Eze Ashley, STUDY: XR CHEST 1 VIEW;  2/1/2024 2:03 pm   INDICATION: Signs/Symptoms:Chest Pain.   COMPARISON: 12/24/2023   ACCESSION NUMBER(S): [id number]   ORDERING CLINICIAN: STEFANIA MACKEY   FINDINGS: Low lung volumes with vascular crowding. Borderline heart size. Atherosclerosis. Diffuse interstitial pulmonary opacities suggestive of pulmonary  edema. Questionable small left pleural effusion. Gas seen deep to the right hemidiaphragm, free air versus bowel gas.       Gas deep to the right hemidiaphragm, free air versus bowel gas. Consider follow-up with decubitus radiographs and/or CT of the abdomen and pelvis as warranted.   Cardiomegaly and pulmonary edema.   Eze Ashley discussed the significance and urgency of this critical finding by telephone with  STEFANIA MACKEY on 2/1/2024 at 2:28 pm.  (**-RCF-**) Findings:  See findings.     Signed by: Eze Ashley 2/1/2024 2:29 PM Dictation workstation:   [id number]       Assessment/Plan   Principal Problem:    Acute on chronic diastolic heart failure (CMS/HCC)  Active Problems:    Aortic stenosis    Dyspnea on exertion    Gastroesophageal reflux disease    Hard of hearing    Malignant neoplasm of prostate (CMS/HCC)    Chronic respiratory failure with hypoxia (CMS/HCC)    Essential hypertension    Acute on chronic congestive heart failure, unspecified heart failure type (CMS/HCC)    Plan:    Cardiology on consult await recommendations  Limited echo ordered  Added Voltaren topical  Cardiac diet  Maintain potassium > 4.0, magnesium > 2.0  PT evaluations appreciated, am pac 19, low intensity  Cr at or near baseline   Home medications resumed  POC discussed with patient and attending  Am labs including cbc, bmp, mag  Dispo: home when cleared by cardiology    Full Code    I spent >40 minutes in the professional and overall care of this patient.      ERIC [facility]  [address] [address], [place]  [address]  Phone: [phone] Fax: [phone]

## 2024-02-02 NOTE — H&P
History Of Present Illness  Michael Sandra is a 89 y.o. male with a pmhx of HFpEF (EF: 50 to 55% 2023), COPD (baseline- 2 L NC), dyslipidemia, CKD (baseline CR: 1.8), COPD, anemia, pulmonary hypertension, prostate cancer and moderate to severe aortic stenosis presenting to [facility] for mid-sternal chest pain. Last night patient reports while watching TV, he started having chest pain on the left sided that radiated to his back worse with deep inspiration. Patient reports it was constant and dull in nature that did not go away but would increase intensity at times. Patient at 4 am took some tylenol to help with chest discomfort, however throughout the day still having the chest pain so decided to come to the ED. Patient did not have increased in sob associated with the chest pain. Of note, patient had a cold like symptoms in the last 3-4 days, reports the cough has resolved. Denies abd pain, nausea, vomiting, fever or chills. Has baseline PND. Lives at home with his wife. Takes Torsemide daily, last dose yesterday morning. Confirmed full code status.     ED Course  Vitals: Pressure 99.9 F, heart rate 80, blood pressure 134/70 and saturating 94% on 2 L of nasal cannula baseline  Labs: CBC significant for Leukocytosis of 12.7 (recent viral infection), H.9 (baseline) and Plt: 143, CMP: Elevated Bun/CR 30 and 1.57 (baseline 1.8), elevated , troponin 25 and 25, TS H within normal limit, glucose 195 and Reassuring age-adjusted D-dimer 580  EKG reviewed nonischemic with HR 87 with PACs (similar to prior EKG) with LVH  COVID/Flu negative  Chest xray: Cardiomegaly and pulmonary edema; Questionable small left pleural effusion. Gas seen deep to the right hemidiaphragm, free air versus bowel gas  CT of the chest/abdomen/pelvis without IV contrast was subsequently performed without evidence of abdominal free air, however Infiltrate/atelectasis in the posterior lower lobes. Mild dilatation of the ascending  aorta measured at 4.3 cm in  (similar to prior CT, follows with cardiology)  ED Intervention: Given Torsemide 10 mg (home dose) and 5 mg oral, asprin 324 mg once and Tylenol 650 mg po daily   Admitted for CHF exacerbation and ACS rule out     Past Medical History  He has a past medical history of Personal history of malignant neoplasm of prostate (03/22/2021).    Surgical History  He has a past surgical history that includes Other surgical history (03/22/2021).     Social History  He reports that he has never smoked. He has never used smokeless tobacco. He reports that he does not drink alcohol and does not use drugs.    Family History  Family History   Problem Relation Name Age of Onset    Hypertension Mother      Heart attack Mother          Allergies  Penicillins    Review of Systems   ROS is negative unless stated otherwise in the HPI    Physical Exam  General: well appearing, no acute distress  HEENT:  moist mucous membranes  CV: regular rate and rhythm, aortic crescendo decrescendo murmur, 2+ pulses in all extremities  RESP: Crackles   Abd: soft, nontender, nondistended  Neuro: alert and oriented x3, speech appropriate  Vascular: BL LE +3 peripheral edema appreciated  Skin: no rashes  MSK: no joint swelling    Last Recorded Vitals  /84   Pulse 76   Temp 37.7 °C (99.9 °F)   Resp 20   Wt 69.4 kg (153 lb)   SpO2 (!) 92%     Relevant Results  Scheduled medications  amLODIPine, 10 mg, oral, Daily  aspirin, 81 mg, oral, Nightly  budesonide, 0.5 mg, nebulization, BID  cholecalciferol, 5,000 Units, oral, Daily  famotidine, 20 mg, oral, Nightly  ferrous sulfate (325 mg ferrous sulfate), 65 mg of iron, oral, Daily  formoterol, 20 mcg, nebulization, BID  furosemide, 20 mg, intravenous, Once  heparin (porcine), 5,000 Units, subcutaneous, q8h  ipratropium, 0.5 mg, nebulization, q6h  losartan, 50 mg, oral, Daily  polyethylene glycol, 17 g, oral, Daily  potassium chloride CR, 10 mEq, oral, Nightly  simvastatin, 40  mg, oral, Nightly  tamsulosin, 0.4 mg, oral, Daily      Continuous medications     PRN medications  PRN medications: acetaminophen, albuterol, oxygen     Results for orders placed or performed during the hospital encounter of 02/01/24 (from the past 24 hour(s))   CBC with Differential   Result Value Ref Range    WBC 12.7 (H) 4.4 - 11.3 x10*3/uL    nRBC 0.0 0.0 - 0.0 /100 WBCs    RBC 3.53 (L) 4.50 - 5.90 x10*6/uL    Hemoglobin 9.9 (L) 13.5 - 17.5 g/dL    Hematocrit 32.6 (L) 41.0 - 52.0 %    MCV 92 80 - 100 fL    MCH 28.0 26.0 - 34.0 pg    MCHC 30.4 (L) 32.0 - 36.0 g/dL    RDW 13.7 11.5 - 14.5 %    Platelets 143 (L) 150 - 450 x10*3/uL    Neutrophils % 89.1 40.0 - 80.0 %    Immature Granulocytes %, Automated 0.6 0.0 - 0.9 %    Lymphocytes % 3.6 13.0 - 44.0 %    Monocytes % 6.4 2.0 - 10.0 %    Eosinophils % 0.1 0.0 - 6.0 %    Basophils % 0.2 0.0 - 2.0 %    Neutrophils Absolute 11.29 (H) 1.60 - 5.50 x10*3/uL    Immature Granulocytes Absolute, Automated 0.07 0.00 - 0.50 x10*3/uL    Lymphocytes Absolute 0.46 (L) 0.80 - 3.00 x10*3/uL    Monocytes Absolute 0.81 (H) 0.05 - 0.80 x10*3/uL    Eosinophils Absolute 0.01 0.00 - 0.40 x10*3/uL    Basophils Absolute 0.02 0.00 - 0.10 x10*3/uL   Comprehensive Metabolic Panel   Result Value Ref Range    Glucose 195 (H) 74 - 99 mg/dL    Sodium 141 136 - 145 mmol/L    Potassium 4.3 3.5 - 5.3 mmol/L    Chloride 104 98 - 107 mmol/L    Bicarbonate 27 21 - 32 mmol/L    Anion Gap 14 10 - 20 mmol/L    Urea Nitrogen 30 (H) 6 - 23 mg/dL    Creatinine 1.57 (H) 0.50 - 1.30 mg/dL    eGFR 41 (L) >60 mL/min/1.73m*2    Calcium 8.5 (L) 8.6 - 10.3 mg/dL    Albumin 4.2 3.4 - 5.0 g/dL    Alkaline Phosphatase 44 33 - 136 U/L    Total Protein 6.8 6.4 - 8.2 g/dL    AST 12 9 - 39 U/L    Bilirubin, Total 0.7 0.0 - 1.2 mg/dL    ALT 10 10 - 52 U/L   Brain Natriuretic Peptide   Result Value Ref Range     (H) 0 - 99 pg/mL   Troponin I, High Sensitivity   Result Value Ref Range    Troponin I, High Sensitivity  25 (H) 0 - 20 ng/L   Protime-INR   Result Value Ref Range    Protime 12.6 9.8 - 12.8 seconds    INR 1.1 0.9 - 1.1   D-dimer, VTE Exclusion   Result Value Ref Range    D-Dimer, Quantitative VTE Exclusion 580 (H) <=500 ng/mL FEU   C-reactive protein   Result Value Ref Range    C-Reactive Protein 3.90 (H) <1.00 mg/dL   ECG 12 lead   Result Value Ref Range    Ventricular Rate 87 BPM    Atrial Rate 87 BPM    ID Interval 168 ms    QRS Duration 86 ms    QT Interval 368 ms    QTC Calculation(Bazett) 442 ms    P Axis 96 degrees    R Axis -23 degrees    T Axis 64 degrees    QRS Count 14 beats    Q Onset 218 ms    P Onset 134 ms    P Offset 168 ms    T Offset 402 ms    QTC Fredericia 416 ms   Troponin I, High Sensitivity   Result Value Ref Range    Troponin I, High Sensitivity 25 (H) 0 - 20 ng/L   Sars-CoV-2 and Influenza A/B PCR   Result Value Ref Range    Flu A Result Not Detected Not Detected    Flu B Result Not Detected Not Detected    Coronavirus 2019, PCR Not Detected Not Detected   RSV PCR   Result Value Ref Range    RSV PCR Not Detected Not Detected   TSH   Result Value Ref Range    Thyroid Stimulating Hormone 2.00 0.44 - 3.98 mIU/L   ECG 12 lead   Result Value Ref Range    Ventricular Rate 88 BPM    Atrial Rate 88 BPM    ID Interval 192 ms    QRS Duration 82 ms    QT Interval 374 ms    QTC Calculation(Bazett) 452 ms    P Axis 38 degrees    R Axis -20 degrees    T Axis 48 degrees    QRS Count 15 beats    Q Onset 221 ms    P Onset 125 ms    P Offset 182 ms    T Offset 408 ms    QTC Fredericia 425 ms   Sedimentation rate, automated   Result Value Ref Range    Sedimentation Rate 31 (H) 0 - 20 mm/h        CT chest abdomen pelvis wo IV contrast    Result Date: 2/1/2024  Interpreted By:  Noa Kwon, STUDY: CT CHEST ABDOMEN PELVIS WO CONTRAST;  2/1/2024 5:02 pm   INDICATION: Signs/Symptoms:chest pain, c/f free air on CXR.   COMPARISON: Chest x-ray performed on this day.   ACCESSION NUMBER(S): [id number]   ORDERING  CLINICIAN: MICKEY HONEYCUTT   TECHNIQUE: CT of the chest, abdomen and pelvis was performed. Contiguous axial images were obtained at 3 mm slice thickness through the chest, abdomen and pelvis. Coronal and sagittal reconstructions at 3 mm slice thickness were performed.  No intravenous contrast was administered; positive oral contrast was given.   FINDINGS: Please note that the study is limited without intravenous contrast. The scan is also limited by artifacts arising from the patient's arms that are kept by his sides.   CHEST:   LUNG/PLEURA/LARGE AIRWAYS: Tracheobronchial tree this patent.  There are infiltrate/atelectasis in the posterior lower lobes. No pleural effusion. No suspicious pulmonary nodules..   VESSELS: Mild dilatation of the ascending aorta measured at 4.3 cm. Pulmonary arteries normal in caliber. Dense three-vessel coronary arterial calcifications. The scan is not optimized for coronary calcium scoring.   HEART: Heart is moderately enlarged. No pericardial effusion.   MEDIASTINUM AND GLORIA: No mediastinal lymphadenopathy.  No large hilar lymph nodes on this nonenhanced scan. Esophagus is unremarkable   CHEST WALL AND LOWER NECK: Severe thoracic kyphosis.. Thyroid is not clearly visualized.   ABDOMEN:   LIVER: Unremarkable.   BILE DUCTS: Unremarkable.   GALLBLADDER: Tiny gallstones but with no signs of acute cholecystitis..   PANCREAS: Atrophic pancreas.   SPLEEN: Unremarkable.   ADRENAL GLANDS: Unremarkable.   KIDNEYS AND URETERS: Normal in size. Multiple bilateral cysts measured up to 7 cm. No hydronephrosis. No urinary tract calculi or obstruction.   PELVIS:   BLADDER: Unremarkable.   REPRODUCTIVE ORGANS: Brachytherapy seeds in the prostate. No pelvic masses.   BOWEL: Colon interposition anteriorly to liver which is responsible for the questionable free air on the recent chest radiograph. Stomach and intestine are otherwise unremarkable. No intestinal wall thickening. Appendix is identified..   VESSELS:  Arteriosclerotic changes. Abdominal aorta and IVC are normal in caliber.   PERITONEUM/RETROPERITONEUM/LYMPH NODES: No evidence of lymphadenopathy. Retroperitoneal is unremarkable. No free fluid or free air.   ABDOMINAL WALL: A small right inguinal hernia which contains fat and fluid.   BONES: Unremarkable. Grade 1 anterolisthesis of C4-C5. No suspicious bone lesions.       Chest 1. Infiltrate/atelectasis in the posterior lower lobes. Rule out pneumonia. No pleural effusion. 2. Mild dilatation of the ascending aorta measured at 4.3 cm in diameter. 3. Exaggerated thoracic kyphosis. 4. Cardiomegaly.   Abdomen-Pelvis 1.  No evidence of free air. Colon interposition anteriorly to the liver which corresponds to the findings noted on the recent chest radiograph. 2. Multiple renal cysts. No hydronephrosis. 3. Tiny gallstones but with no signs of acute cholecystitis 4. A small right inguinal hernia containing fat and fluid. 5. Brachytherapy seeds in the prostate.     Signed by: Noa Kwon 2/1/2024 5:59 PM Dictation workstation:   [id number]    ECG 12 lead    Result Date: 2/1/2024  Sinus rhythm with Premature atrial complexes Moderate voltage criteria for LVH, may be normal variant Borderline ECG When compared with ECG of 01-FEB-2024 12:49, (unconfirmed) Premature atrial complexes are now Present    ECG 12 lead    Result Date: 2/1/2024  Sinus rhythm with marked sinus arrhythmia Moderate voltage criteria for LVH, may be normal variant Borderline ECG When compared with ECG of 24-DEC-2023 15:58, (unconfirmed) Sinus rhythm has replaced Atrial fibrillation Non-specific change in ST segment in Lateral leads    XR chest 1 view    Result Date: 2/1/2024  Interpreted By:  Eze Ashley, STUDY: XR CHEST 1 VIEW;  2/1/2024 2:03 pm   INDICATION: Signs/Symptoms:Chest Pain.   COMPARISON: 12/24/2023   ACCESSION NUMBER(S): [id number]   ORDERING CLINICIAN: STEFANIA MACKEY   FINDINGS: Low lung volumes with vascular crowding. Borderline  heart size. Atherosclerosis. Diffuse interstitial pulmonary opacities suggestive of pulmonary edema. Questionable small left pleural effusion. Gas seen deep to the right hemidiaphragm, free air versus bowel gas.       Gas deep to the right hemidiaphragm, free air versus bowel gas. Consider follow-up with decubitus radiographs and/or CT of the abdomen and pelvis as warranted.   Cardiomegaly and pulmonary edema.   Eze Ashley discussed the significance and urgency of this critical finding by telephone with  STEFANIA MACKEY on 2/1/2024 at 2:28 pm.  (**-RCF-**) Findings:  See findings.     Signed by: Eze Ashley 2/1/2024 2:29 PM Dictation workstation:   [id number]      Assessment/Plan   Principal Problem:    Acute on chronic diastolic heart failure (CMS/HCC)  Active Problems:    Aortic stenosis    Dyspnea on exertion    Gastroesophageal reflux disease    Hard of hearing    Malignant neoplasm of prostate (CMS/HCC)    Chronic respiratory failure with hypoxia (CMS/HCC)    Essential hypertension    Acute on chronic congestive heart failure, unspecified heart failure type (CMS/HCC)    A 89 year old male with a pmhx of HFpEF (EF: 50 to 55% October 2023), COPD (baseline- 2 L NC), dyslipidemia, CKD (baseline CR: 1.8), COPD, anemia, pulmonary hypertension, prostate cancer and moderate to severe aortic stenosis presenting to [place] ED for mid-sternal chest pain. Admitted for CHF exacerbation and ACS rule out    Plan  - Cardiology consulted: appreciate recommendations  - Will give another dose of IV lasix 20 mg; given hx of aortic stenosis will be gentle; s/p Torsemide 15 mg oral in the ED  - EKG no diffuse concave ST elevations; Ordered CRP/ESR to help with viral pericarditis vs myocarditis   - Ordered limited echo   - Aspirin 81 mg po daily and home statin  - Continued home medications   - Patient has known hx of dilatation of the ascending aorta, continue following outpatient with cards   - Strict & In's and  daily weights  - Avoid nephrotoxic agents; will continue losartan for now given kidney function is at baseline; but if develops JESSICA would not to be held  - Tele-monitoring   - NPO after midnight  - PT/OT added  - DVT ppx: heparin   - Confirmed full code status during admission     Ronnie Oliveira DO  Internal Medicine, PGY3   Case and plan discussed with attending

## 2024-02-02 NOTE — NURSING NOTE
Shift summary:  Pt was admitted last night for CHF exacerbation. Lasix given IV. External cath on.  Pt did stand at bedside with 2 assist. A&Ox4 but very [place] with bilateral hearing aids. Pt had a late night meal before being NPO for possible stress test today with cardiology consult. Plan for echo. Vitals stable on baseline 2L NC.

## 2024-02-02 NOTE — CARE PLAN
The patient's goals for the shift include      The clinical goals for the shift include see care plan      Problem: Pain - Adult  Goal: Verbalizes/displays adequate comfort level or baseline comfort level  Outcome: Progressing     Problem: Safety - Adult  Goal: Free from fall injury  Outcome: Progressing     Problem: Discharge Planning  Goal: Discharge to home or other facility with appropriate resources  Outcome: Progressing     Problem: Pain  Goal: Takes deep breaths with improved pain control throughout the shift  Outcome: Progressing  Goal: Turns in bed with improved pain control throughout the shift  Outcome: Progressing  Goal: Walks with improved pain control throughout the shift  Outcome: Progressing  Goal: Performs ADL's with improved pain control throughout shift  Outcome: Progressing  Goal: Participates in PT with improved pain control throughout the shift  Outcome: Progressing  Goal: Free from opioid side effects throughout the shift  Outcome: Progressing  Goal: Free from acute confusion related to pain meds throughout the shift  Outcome: Progressing

## 2024-02-02 NOTE — PROGRESS NOTES
Attempted to meet with patient at bedside- patient not in room at this time. Will attempt assessment at another time.       Stefani Perez RN

## 2024-02-02 NOTE — NURSING NOTE
2/2/2024    1000- Patient had echocardiogram. Now up to chair with PT/OT.     1600- Patient continues to c/o chest pain. Tylenol offered. Patient requesting something other than tylenol. Provider notified.       IV Lasix given this shift. Family at bedside.       Chetna Espinosa LPN

## 2024-02-02 NOTE — PROGRESS NOTES
"Occupational Therapy    Occupational Therapy    Evaluation    Patient Name: José Antonio Sandra \"Dionte"  MRN: [id number]  Today's Date: 2/2/2024  Time Calculation  Start Time: 0905  Stop Time: 0915  Time Calculation (min): 10 min    Assessment  IP OT Assessment  OT Assessment:  (Pt. presents with decreased balance impacting pt. ability to complete ADLs and mobility independently)  Prognosis: Good  Evaluation/Treatment Tolerance: Patient limited by pain  End of Session Communication: Bedside nurse  End of Session Patient Position: Up in chair, Alarm on    Plan:  Treatment Interventions: ADL retraining, Functional transfer training, UE strengthening/ROM  OT Frequency: 3 times per week  OT Discharge Recommendations: Low intensity level of continued care  OT Recommended Transfer Status: Minimal assist  OT - OK to Discharge: Yes (Next level of care when cleared by medical team)    Subjective   Michael Sandra is a 89 y.o. male with a pmhx of HFpEF (EF: 50 to 55% October 2023), COPD (baseline- 2 L NC), dyslipidemia, CKD (baseline CR: 1.8), COPD, anemia, pulmonary hypertension, prostate cancer and moderate to severe aortic stenosis presenting to [place] ED for mid-sternal chest pain. Last night patient reports while watching TV, he started having chest pain on the left sided that radiated to his back worse with deep inspiration. Patient reports it was constant and dull in nature that did not go away but would increase intensity at times. Patient at 4 am took some tylenol to help with chest discomfort, however throughout the day still having the chest pain so decided to come to the ED. Patient did not have increased in sob associated with the chest pain. Of note, patient had a cold like symptoms in the last 3-4 days, reports the cough has resolved. Denies abd pain, nausea, vomiting, fever or chills. Has baseline PND. Lives at home with his wife. Takes Torsemide daily, last dose yesterday morning. Confirmed full code status. "      Current Problem:  1. Acute on chronic congestive heart failure, unspecified heart failure type (CMS/HCC)        2. Chest pain, unspecified type  Transthoracic Echo (TTE) Limited    Transthoracic Echo (TTE) Limited      3. Acute on chronic diastolic heart failure (CMS/HCC)  Transthoracic Echo (TTE) Limited    Transthoracic Echo (TTE) Limited          General:  General  Reason for Referral: ADLs, Discharge plannin  Referred By: Dr. Christiansen  Family/Caregiver Present: No  Co-Treatment: PT  Co-Treatment Reason: Safety  Prior to Session Communication: Bedside nurse  Patient Position Received: Bed, 3 rail up, Alarm on  General Comment: Very [place]    Precautions:  Medical Precautions: Fall precautions      Pain:  Pain Assessment  Pain Assessment: 0-10  Pain Score: 6  Pain Type: Acute pain  Pain Location: Chest    Objective     Cognition:  Overall Cognitive Status: Within Functional Limits  Orientation Level: Oriented X4             Home Living:  Home Living Comments:  (Pt. lives with spouse in ranch home with walk in shower with grab bars and shower chair. PLOF MOD I with cane)     Prior Function:  Level of [place]: Independent with ADLs and functional transfers  ADL Assistance: Independent  Homemaking Assistance: Independent  Ambulatory Assistance: Independent      ADL:  Grooming Assistance: Stand by  LE Dressing Assistance: Minimal    Activity Tolerance:  Endurance: Endurance does not limit participation in activity    Bed Mobility/Transfers:   Bed Mobility  Bed Mobility:  (supine to sit min assist)  Transfers  Transfer:  (sit<>stand min assist)    Ambulation/Gait Training:  Ambulation/Gait Training  Ambulation/Gait Training Performed:  (Pt. completes short distance mobility min assist with hand held assist)    Sitting Balance:  Static Sitting Balance  Static Sitting-Balance Support: Bilateral upper extremity supported  Static Sitting-Level of Assistance: Distant supervision    Standing Balance:  Static Standing  Balance  Static Standing-Balance Support: Right upper extremity supported  Static Standing-Level of Assistance: Minimum assistance      Sensation:  Sensation Comment: Denies numbness        Hand Function:  Hand Function  Gross Grasp: Functional  Coordination: Functional    Extremities: RUE   RUE : Within Functional Limits and ANNITAE   LUE: Within Functional Limits    Outcome Measures: [facility] Daily Activity  Putting on and taking off regular lower body clothing: A little  Bathing (including washing, rinsing, drying): A little  Putting on and taking off regular upper body clothing: None  Toileting, which includes using toilet, bedpan or urinal: A little  Taking care of personal grooming such as brushing teeth: A little  Eating Meals: None  Daily Activity - Total Score: 20        EDUCATION:  Education  Individual(s) Educated: Patient  Education Provided: Fall precautons, Risk and benefits of OT discussed with patient or other, POC discussed and agreed upon  Patient Response to Education: Patient/Caregiver Verbalized Understanding of Information      Goals:   Encounter Problems       Encounter Problems (Active)       Dressings Lower Extremities       STG - Patient to complete lower body dressing MOD I       Start:  02/02/24    Expected End:  02/16/24               Grooming       STG - Patient completes grooming MOD I       Start:  02/02/24    Expected End:  02/16/24            STG - Patient will tolerate standing 4-8 min       Start:  02/02/24    Expected End:  02/16/24               Transfers       STG - Patient will perform bed mobility MOD I       Start:  02/02/24    Expected End:  02/16/24            STG - Patient will perform toilet transfer MOD I       Start:  02/02/24    Expected End:  02/16/24

## 2024-02-03 ENCOUNTER — APPOINTMENT (OUTPATIENT)
Dept: RADIOLOGY | Facility: HOSPITAL | Age: 89
DRG: 280 | End: 2024-02-03
Payer: MEDICARE

## 2024-02-03 LAB
ABO GROUP (TYPE) IN BLOOD: NORMAL
ABO GROUP (TYPE) IN BLOOD: NORMAL
ALBUMIN SERPL BCP-MCNC: 3.2 G/DL (ref 3.4–5)
ALP SERPL-CCNC: 38 U/L (ref 33–136)
ALT SERPL W P-5'-P-CCNC: 9 U/L (ref 10–52)
ANION GAP SERPL CALC-SCNC: 10 MMOL/L (ref 10–20)
ANTIBODY SCREEN: NORMAL
AST SERPL W P-5'-P-CCNC: 10 U/L (ref 9–39)
ATRIAL RATE: 87 BPM
BILIRUB SERPL-MCNC: 0.5 MG/DL (ref 0–1.2)
BLOOD EXPIRATION DATE: NORMAL
BUN SERPL-MCNC: 34 MG/DL (ref 6–23)
CALCIUM SERPL-MCNC: 8.4 MG/DL (ref 8.6–10.3)
CHLORIDE SERPL-SCNC: 103 MMOL/L (ref 98–107)
CO2 SERPL-SCNC: 30 MMOL/L (ref 21–32)
CREAT SERPL-MCNC: 1.73 MG/DL (ref 0.5–1.3)
DISPENSE STATUS: NORMAL
EGFRCR SERPLBLD CKD-EPI 2021: 36 ML/MIN/1.73M*2
ERYTHROCYTE [DISTWIDTH] IN BLOOD BY AUTOMATED COUNT: 13.6 % (ref 11.5–14.5)
GLUCOSE SERPL-MCNC: 115 MG/DL (ref 74–99)
HCT VFR BLD AUTO: 28 % (ref 41–52)
HGB BLD-MCNC: 8.2 G/DL (ref 13.5–17.5)
MAGNESIUM SERPL-MCNC: 2.14 MG/DL (ref 1.6–2.4)
MCH RBC QN AUTO: 27.8 PG (ref 26–34)
MCHC RBC AUTO-ENTMCNC: 29.3 G/DL (ref 32–36)
MCV RBC AUTO: 95 FL (ref 80–100)
NRBC BLD-RTO: 0 /100 WBCS (ref 0–0)
P AXIS: 96 DEGREES
P OFFSET: 168 MS
P ONSET: 134 MS
PLATELET # BLD AUTO: 120 X10*3/UL (ref 150–450)
POTASSIUM SERPL-SCNC: 4.6 MMOL/L (ref 3.5–5.3)
PR INTERVAL: 168 MS
PRODUCT BLOOD TYPE: 6200
PRODUCT CODE: NORMAL
PROT SERPL-MCNC: 5.9 G/DL (ref 6.4–8.2)
Q ONSET: 218 MS
QRS COUNT: 14 BEATS
QRS DURATION: 86 MS
QT INTERVAL: 368 MS
QTC CALCULATION(BAZETT): 442 MS
QTC FREDERICIA: 416 MS
R AXIS: -23 DEGREES
RBC # BLD AUTO: 2.95 X10*6/UL (ref 4.5–5.9)
RH FACTOR (ANTIGEN D): NORMAL
RH FACTOR (ANTIGEN D): NORMAL
SODIUM SERPL-SCNC: 138 MMOL/L (ref 136–145)
T AXIS: 64 DEGREES
T OFFSET: 402 MS
UNIT ABO: NORMAL
UNIT NUMBER: NORMAL
UNIT RH: NORMAL
UNIT VOLUME: 400
VENTRICULAR RATE: 87 BPM
WBC # BLD AUTO: 9.9 X10*3/UL (ref 4.4–11.3)
XM INTEP: NORMAL

## 2024-02-03 PROCEDURE — 83735 ASSAY OF MAGNESIUM: CPT | Performed by: NURSE PRACTITIONER

## 2024-02-03 PROCEDURE — 2500000004 HC RX 250 GENERAL PHARMACY W/ HCPCS (ALT 636 FOR OP/ED): Performed by: INTERNAL MEDICINE

## 2024-02-03 PROCEDURE — 94640 AIRWAY INHALATION TREATMENT: CPT

## 2024-02-03 PROCEDURE — 71045 X-RAY EXAM CHEST 1 VIEW: CPT

## 2024-02-03 PROCEDURE — 36415 COLL VENOUS BLD VENIPUNCTURE: CPT | Performed by: NURSE PRACTITIONER

## 2024-02-03 PROCEDURE — 86920 COMPATIBILITY TEST SPIN: CPT

## 2024-02-03 PROCEDURE — 2500000004 HC RX 250 GENERAL PHARMACY W/ HCPCS (ALT 636 FOR OP/ED): Performed by: STUDENT IN AN ORGANIZED HEALTH CARE EDUCATION/TRAINING PROGRAM

## 2024-02-03 PROCEDURE — 2060000001 HC INTERMEDIATE ICU ROOM DAILY

## 2024-02-03 PROCEDURE — 84075 ASSAY ALKALINE PHOSPHATASE: CPT | Performed by: STUDENT IN AN ORGANIZED HEALTH CARE EDUCATION/TRAINING PROGRAM

## 2024-02-03 PROCEDURE — 71045 X-RAY EXAM CHEST 1 VIEW: CPT | Performed by: STUDENT IN AN ORGANIZED HEALTH CARE EDUCATION/TRAINING PROGRAM

## 2024-02-03 PROCEDURE — 36415 COLL VENOUS BLD VENIPUNCTURE: CPT | Performed by: STUDENT IN AN ORGANIZED HEALTH CARE EDUCATION/TRAINING PROGRAM

## 2024-02-03 PROCEDURE — P9016 RBC LEUKOCYTES REDUCED: HCPCS

## 2024-02-03 PROCEDURE — 2500000001 HC RX 250 WO HCPCS SELF ADMINISTERED DRUGS (ALT 637 FOR MEDICARE OP): Performed by: STUDENT IN AN ORGANIZED HEALTH CARE EDUCATION/TRAINING PROGRAM

## 2024-02-03 PROCEDURE — 2500000002 HC RX 250 W HCPCS SELF ADMINISTERED DRUGS (ALT 637 FOR MEDICARE OP, ALT 636 FOR OP/ED): Performed by: STUDENT IN AN ORGANIZED HEALTH CARE EDUCATION/TRAINING PROGRAM

## 2024-02-03 PROCEDURE — 99233 SBSQ HOSP IP/OBS HIGH 50: CPT | Performed by: INTERNAL MEDICINE

## 2024-02-03 PROCEDURE — 99233 SBSQ HOSP IP/OBS HIGH 50: CPT | Performed by: NURSE PRACTITIONER

## 2024-02-03 PROCEDURE — 2500000001 HC RX 250 WO HCPCS SELF ADMINISTERED DRUGS (ALT 637 FOR MEDICARE OP): Performed by: INTERNAL MEDICINE

## 2024-02-03 PROCEDURE — 2500000005 HC RX 250 GENERAL PHARMACY W/O HCPCS: Performed by: STUDENT IN AN ORGANIZED HEALTH CARE EDUCATION/TRAINING PROGRAM

## 2024-02-03 PROCEDURE — 86901 BLOOD TYPING SEROLOGIC RH(D): CPT | Performed by: NURSE PRACTITIONER

## 2024-02-03 PROCEDURE — 85027 COMPLETE CBC AUTOMATED: CPT | Performed by: STUDENT IN AN ORGANIZED HEALTH CARE EDUCATION/TRAINING PROGRAM

## 2024-02-03 PROCEDURE — 36430 TRANSFUSION BLD/BLD COMPNT: CPT

## 2024-02-03 PROCEDURE — 2500000004 HC RX 250 GENERAL PHARMACY W/ HCPCS (ALT 636 FOR OP/ED): Performed by: NURSE PRACTITIONER

## 2024-02-03 PROCEDURE — 2500000001 HC RX 250 WO HCPCS SELF ADMINISTERED DRUGS (ALT 637 FOR MEDICARE OP)

## 2024-02-03 RX ORDER — FUROSEMIDE 10 MG/ML
40 INJECTION INTRAMUSCULAR; INTRAVENOUS ONCE
Status: COMPLETED | OUTPATIENT
Start: 2024-02-03 | End: 2024-02-03

## 2024-02-03 RX ADMIN — FAMOTIDINE 20 MG: 20 TABLET ORAL at 21:36

## 2024-02-03 RX ADMIN — FUROSEMIDE 5 MG/HR: 10 INJECTION, SOLUTION INTRAMUSCULAR; INTRAVENOUS at 13:26

## 2024-02-03 RX ADMIN — TAMSULOSIN HYDROCHLORIDE 0.4 MG: 0.4 CAPSULE ORAL at 08:57

## 2024-02-03 RX ADMIN — SIMVASTATIN 40 MG: 40 TABLET, FILM COATED ORAL at 21:36

## 2024-02-03 RX ADMIN — LOSARTAN POTASSIUM 50 MG: 50 TABLET, FILM COATED ORAL at 08:57

## 2024-02-03 RX ADMIN — BUDESONIDE 0.5 MG: 0.5 INHALANT ORAL at 20:17

## 2024-02-03 RX ADMIN — Medication 10 L/MIN: at 15:56

## 2024-02-03 RX ADMIN — HEPARIN SODIUM 5000 UNITS: 5000 INJECTION INTRAVENOUS; SUBCUTANEOUS at 16:46

## 2024-02-03 RX ADMIN — IPRATROPIUM BROMIDE 0.5 MG: 0.5 SOLUTION RESPIRATORY (INHALATION) at 08:51

## 2024-02-03 RX ADMIN — HEPARIN SODIUM 5000 UNITS: 5000 INJECTION INTRAVENOUS; SUBCUTANEOUS at 00:33

## 2024-02-03 RX ADMIN — Medication 10 L/MIN: at 13:46

## 2024-02-03 RX ADMIN — BUDESONIDE 0.5 MG: 0.5 INHALANT ORAL at 08:52

## 2024-02-03 RX ADMIN — IPRATROPIUM BROMIDE 0.5 MG: 0.5 SOLUTION RESPIRATORY (INHALATION) at 16:57

## 2024-02-03 RX ADMIN — FORMOTEROL FUMARATE 20 MCG: 20 SOLUTION RESPIRATORY (INHALATION) at 08:51

## 2024-02-03 RX ADMIN — FUROSEMIDE 40 MG: 10 INJECTION, SOLUTION INTRAMUSCULAR; INTRAVENOUS at 13:23

## 2024-02-03 RX ADMIN — ASPIRIN 81 MG: 81 TABLET, COATED ORAL at 21:36

## 2024-02-03 RX ADMIN — FERROUS SULFATE TAB 325 MG (65 MG ELEMENTAL FE) 1 TABLET: 325 (65 FE) TAB at 08:57

## 2024-02-03 RX ADMIN — CHOLECALCIFEROL TAB 125 MCG (5000 UNIT) 5000 UNITS: 125 TAB at 08:56

## 2024-02-03 RX ADMIN — FUROSEMIDE 40 MG: 10 INJECTION, SOLUTION INTRAMUSCULAR; INTRAVENOUS at 05:22

## 2024-02-03 RX ADMIN — FORMOTEROL FUMARATE 20 MCG: 20 SOLUTION RESPIRATORY (INHALATION) at 20:17

## 2024-02-03 RX ADMIN — AMLODIPINE BESYLATE 10 MG: 10 TABLET ORAL at 08:56

## 2024-02-03 RX ADMIN — METOPROLOL SUCCINATE 25 MG: 25 TABLET, EXTENDED RELEASE ORAL at 08:56

## 2024-02-03 RX ADMIN — HEPARIN SODIUM 5000 UNITS: 5000 INJECTION INTRAVENOUS; SUBCUTANEOUS at 08:56

## 2024-02-03 RX ADMIN — IPRATROPIUM BROMIDE 0.5 MG: 0.5 SOLUTION RESPIRATORY (INHALATION) at 20:17

## 2024-02-03 ASSESSMENT — COGNITIVE AND FUNCTIONAL STATUS - GENERAL
TOILETING: A LOT
MOVING TO AND FROM BED TO CHAIR: A LITTLE
DRESSING REGULAR LOWER BODY CLOTHING: A LOT
DRESSING REGULAR UPPER BODY CLOTHING: A LITTLE
MOBILITY SCORE: 15
PERSONAL GROOMING: A LITTLE
CLIMB 3 TO 5 STEPS WITH RAILING: A LOT
HELP NEEDED FOR BATHING: A LOT
TOILETING: A LOT
MOVING FROM LYING ON BACK TO SITTING ON SIDE OF FLAT BED WITH BEDRAILS: A LOT
TURNING FROM BACK TO SIDE WHILE IN FLAT BAD: A LOT
WALKING IN HOSPITAL ROOM: A LITTLE
DAILY ACTIVITIY SCORE: 15
EATING MEALS: A LITTLE
MOVING TO AND FROM BED TO CHAIR: A LITTLE
MOVING FROM LYING ON BACK TO SITTING ON SIDE OF FLAT BED WITH BEDRAILS: A LOT
TURNING FROM BACK TO SIDE WHILE IN FLAT BAD: A LOT
PERSONAL GROOMING: A LITTLE
DRESSING REGULAR LOWER BODY CLOTHING: A LOT
MOBILITY SCORE: 15
DAILY ACTIVITIY SCORE: 15
DRESSING REGULAR UPPER BODY CLOTHING: A LITTLE
STANDING UP FROM CHAIR USING ARMS: A LITTLE
EATING MEALS: A LITTLE
CLIMB 3 TO 5 STEPS WITH RAILING: A LOT
HELP NEEDED FOR BATHING: A LOT
STANDING UP FROM CHAIR USING ARMS: A LITTLE
WALKING IN HOSPITAL ROOM: A LITTLE

## 2024-02-03 ASSESSMENT — PAIN SCALES - GENERAL
PAINLEVEL_OUTOF10: 0 - NO PAIN

## 2024-02-03 ASSESSMENT — PAIN - FUNCTIONAL ASSESSMENT
PAIN_FUNCTIONAL_ASSESSMENT: 0-10

## 2024-02-03 NOTE — NURSING NOTE
1115- Patient stating at 86%, oxygen increased to 12L. Dr Christiansen notified. Dr Ricks also aware as he is currently on the unit. Patient asymptomatic.     1130- Patient wife and son at bedside. They are aware of the plan to transfer the patient to CCU.     1300- Report called Tamica.     1330- IV lasix drip started. Patient transferred downstairs to 2102. Tamica at bedside. Patient hooked up to teley and oxygen at 10L oximizer. Bed in low position, call light within reach, and bed alarm on.

## 2024-02-03 NOTE — PROGRESS NOTES
"José Antonio Sandra \"Michael\" is a 89 y.o. male on day 2 of admission presenting with Acute on chronic diastolic heart failure (CMS/HCC).    Subjective   Patient seen and examined  Resting in bed, NAD  Increased o2 requirements overnight 6 lpm nasal cannula up from 2 lpm nasal cannula at baseline  Appears more edematous in lower extremities  Patient has no complaints this am    Objective     Physical Exam  General: well appearing, no acute distress  HEENT:  moist mucous membranes  CV: regular rate and rhythm, aortic crescendo decrescendo murmur, 2+ pulses in all extremities  RESP: bilateral basilar crackles at bases, on 6 lpm nasal cannula  Abd: soft, nontender, nondistended  Neuro: alert and oriented x3, speech appropriate  Vascular: BL LE +3 peripheral edema appreciated  Skin: no rashes  MSK: no joint swelling    Last Recorded Vitals  Blood pressure 121/67, pulse 78, temperature 36.7 °C (98.1 °F), resp. rate 18, height 1.676 m (5' 6\"), weight 73.5 kg (162 lb 0.6 oz), SpO2 91 %.  Intake/Output last 3 Shifts:  I/O last 3 completed shifts:  In: 240 (3.3 mL/kg) [P.O.:240]  Out: 1050 (14.3 mL/kg) [Urine:1050 (0.4 mL/kg/hr)]  Weight: 73.5 kg     Relevant Results  Scheduled medications  amLODIPine, 10 mg, oral, Daily  aspirin, 81 mg, oral, Nightly  budesonide, 0.5 mg, nebulization, BID  cholecalciferol, 5,000 Units, oral, Daily  famotidine, 20 mg, oral, Nightly  ferrous sulfate (325 mg ferrous sulfate), 65 mg of iron, oral, Daily  formoterol, 20 mcg, nebulization, BID  heparin (porcine), 5,000 Units, subcutaneous, q8h  ipratropium, 0.5 mg, nebulization, TID  losartan, 50 mg, oral, Daily  metoprolol succinate XL, 25 mg, oral, Daily  polyethylene glycol, 17 g, oral, Daily  potassium chloride CR, 10 mEq, oral, Nightly  simvastatin, 40 mg, oral, Nightly  tamsulosin, 0.4 mg, oral, Daily      Continuous medications     PRN medications  PRN medications: acetaminophen, albuterol, diclofenac sodium, oxygen  Results from last 7 days "   Lab Units 02/03/24  0724 02/01/24  1310   WBC AUTO x10*3/uL 9.9 12.7*   RBC AUTO x10*6/uL 2.95* 3.53*   HEMOGLOBIN g/dL 8.2* 9.9*       Results from last 7 days   Lab Units 02/03/24  0724 02/02/24  0815 02/01/24  1310   SODIUM mmol/L 138 139 141   POTASSIUM mmol/L 4.6 4.1 4.3   CHLORIDE mmol/L 103 106 104   CO2 mmol/L 30 27 27   BUN mg/dL 34* 31* 30*   CREATININE mg/dL 1.73* 1.56* 1.57*   CALCIUM mg/dL 8.4* 8.2* 8.5*   MAGNESIUM mg/dL 2.14 2.05  --    BILIRUBIN TOTAL mg/dL 0.5 0.5 0.7   ALT U/L 9* 9* 10   AST U/L 10 10 12         ECG 12 lead    Result Date: 2/2/2024  Sinus rhythm with Premature atrial complexes Moderate voltage criteria for LVH, may be normal variant Borderline ECG When compared with ECG of 01-FEB-2024 12:49, (unconfirmed) Premature atrial complexes are now Present Confirmed by Avtar Cm ([id number]) on 2/2/2024 1:26:52 PM    ECG 12 lead    Result Date: 2/2/2024  Sinus rhythm with marked sinus arrhythmia Moderate voltage criteria for LVH, may be normal variant Borderline ECG When compared with ECG of 24-DEC-2023 15:58, (unconfirmed) Sinus rhythm has replaced Atrial fibrillation Non-specific change in ST segment in Lateral leads Confirmed by Avtar Cm ([id number]) on 2/2/2024 1:25:38 PM    Electrocardiogram, 12-lead PRN ACS symptoms    Result Date: 2/2/2024  Sinus rhythm with marked sinus arrhythmia Moderate voltage criteria for LVH, may be normal variant Borderline ECG When compared with ECG of 24-DEC-2023 15:58, (unconfirmed) Sinus rhythm has replaced Atrial fibrillation Non-specific change in ST segment in Lateral leads    Transthoracic Echo (TTE) Limited    Result Date: 2/2/2024            [facility] [place] [address] [facility] [id number]    Tel [phone] Fax [phone] TRANSTHORACIC ECHOCARDIOGRAM REPORT  Patient Name:      MICHELLE TORRES    Reading Physician:   [id number] Armando Ricks MD Study Date:        2/2/2024             Ordering Provider:   [id number] TIFFANY JORDAN MRN/PID:           [id number]            Fellow: Accession#:        [id number]        Nurse: Date of Birth/Age: 1/10/1931 / 89      Sonographer:         Ana Laura Dalton RDCS                    years Gender:            M                   Additional Staff: Height:            167.64 cm           Admit Date:          2/1/2024 Weight:            72.12 kg            Admission Status:    Inpatient - Routine BSA:               1.81 m2             Department Location: [facility] Echo Lab Blood Pressure: 131 /69 mmHg Study Type:    TRANSTHORACIC ECHO (TTE) LIMITED Diagnosis/ICD: Chest pain, unspecified-R07.9; Acute on chronic diastolic                (congestive) heart failure (CHF)-I50.33 Indication:    CP, CHF CPT Codes:     Echo Limited-67828  Study Detail: The following Echo studies were performed: 2D, M-Mode, Doppler and               color flow.  PHYSICIAN INTERPRETATION: Left Ventricle: Left ventricular systolic function is moderately decreased, with an estimated ejection fraction of 35-40%. There is global hypokinesis of the left ventricle with minor regional variations. The left ventricular cavity size is normal. Spectral Doppler shows a pseudonormal pattern of left ventricular diastolic filling. Left Atrium: The left atrium was not assessed. Right Ventricle: The right ventricle is normal in size. There is normal right ventricular global systolic function. Right Atrium: The right atrium was not assessed. Aortic Valve: The aortic valve is probably trileaflet. There is moderate aortic valve cusp calcification. There is moderate aortic valve thickening. There is evidence of severe aortic valve stenosis. There is trivial aortic valve regurgitation. Probably severe AS. Recommend additional measurements if clinically indicated. Mitral Valve: The mitral valve is normal in structure. There is moderate mitral valve regurgitation. Tricuspid Valve: The tricuspid valve is structurally normal.  There is moderate tricuspid regurgitation. The Doppler estimated RVSP is severely elevated at 66.0 mmHg. Pulmonic Valve: The pulmonic valve is structurally normal. There is no indication of pulmonic valve regurgitation. Pericardium: There is no pericardial effusion noted. Aorta: The aortic root was not assessed. Systemic Veins: The inferior vena cava appears moderately dilated. In comparison to the previous echocardiogram(s): Prior examinations are available and were reviewed for comparison purposes. Compared to 10/2023 study the LVEF is worse.  CONCLUSIONS:  1. Left ventricular systolic function is moderately decreased with a 35-40% estimated ejection fraction.  2. Spectral Doppler shows a pseudonormal pattern of left ventricular diastolic filling.  3. Moderate mitral valve regurgitation.  4. Moderate tricuspid regurgitation.  5. Severely elevated right ventricular systolic pressure.  6. Probably severe AS. Recommend additional measurements if clinically indicated.  7. Severe aortic valve stenosis.  8. There is moderate aortic valve cusp calcification.  9. The inferior vena cava appears moderately dilated. 10. Compared to 10/2023 study the LVEF is worse. 11. There is global hypokinesis of the left ventricle with minor regional variations. QUANTITATIVE DATA SUMMARY: 2D MEASUREMENTS:                           Normal Ranges: IVSd:          1.10 cm    (0.6-1.1cm) LVPWd:         1.00 cm    (0.6-1.1cm) LVIDd:         5.10 cm    (3.9-5.9cm) LVIDs:         4.00 cm LV Mass Index: 110.7 g/m2 LV % FS        21.6 % LV SYSTOLIC FUNCTION BY 2D PLANIMETRY (MOD):                     Normal Ranges: EF-A4C View: 38.0 % (>=55%) EF-A2C View: 38.9 % EF-Biplane:  38.9 % LV DIASTOLIC FUNCTION:                        Normal Ranges: MV Peak E:    1.46 m/s (0.7-1.2 m/s) MV Peak A:    1.12 m/s (0.42-0.7 m/s) E/A Ratio:    1.30     (1.0-2.2) MV e'         0.05 m/s (>8.0) MV lateral e' 0.06 m/s MV medial e'  0.05 m/s E/e' Ratio:   27.37     (<8.0) MITRAL VALVE:                 Normal Ranges: MV DT: 174 msec (150-240msec) AORTIC VALVE:                         Normal Ranges: LVOT Max Wilner:  0.86 m/s (<=1.1m/s) LVOT VTI:      18.70 cm LVOT Diameter: 2.10 cm  (1.8-2.4cm)  RIGHT VENTRICLE: TAPSE: 20.3 mm RV s'  0.08 m/s TRICUSPID VALVE/RVSP:                             Normal Ranges: Peak TR Velocity: 3.97 m/s RV Syst Pressure: 66.0 mmHg (< 30mmHg)  [id number] Armando iRcks MD Electronically signed on 2/2/2024 at 10:28:08 AM  ** Final **     CT chest abdomen pelvis wo IV contrast    Result Date: 2/1/2024  Interpreted By:  Noa Kwon, STUDY: CT CHEST ABDOMEN PELVIS WO CONTRAST;  2/1/2024 5:02 pm   INDICATION: Signs/Symptoms:chest pain, c/f free air on CXR.   COMPARISON: Chest x-ray performed on this day.   ACCESSION NUMBER(S): [id number]   ORDERING CLINICIAN: MICKEY HONEYCUTT   TECHNIQUE: CT of the chest, abdomen and pelvis was performed. Contiguous axial images were obtained at 3 mm slice thickness through the chest, abdomen and pelvis. Coronal and sagittal reconstructions at 3 mm slice thickness were performed.  No intravenous contrast was administered; positive oral contrast was given.   FINDINGS: Please note that the study is limited without intravenous contrast. The scan is also limited by artifacts arising from the patient's arms that are kept by his sides.   CHEST:   LUNG/PLEURA/LARGE AIRWAYS: Tracheobronchial tree this patent.  There are infiltrate/atelectasis in the posterior lower lobes. No pleural effusion. No suspicious pulmonary nodules..   VESSELS: Mild dilatation of the ascending aorta measured at 4.3 cm. Pulmonary arteries normal in caliber. Dense three-vessel coronary arterial calcifications. The scan is not optimized for coronary calcium scoring.   HEART: Heart is moderately enlarged. No pericardial effusion.   MEDIASTINUM AND GLORIA: No mediastinal lymphadenopathy.  No large hilar lymph nodes on this nonenhanced scan. Esophagus is unremarkable   CHEST  WALL AND LOWER NECK: Severe thoracic kyphosis.. Thyroid is not clearly visualized.   ABDOMEN:   LIVER: Unremarkable.   BILE DUCTS: Unremarkable.   GALLBLADDER: Tiny gallstones but with no signs of acute cholecystitis..   PANCREAS: Atrophic pancreas.   SPLEEN: Unremarkable.   ADRENAL GLANDS: Unremarkable.   KIDNEYS AND URETERS: Normal in size. Multiple bilateral cysts measured up to 7 cm. No hydronephrosis. No urinary tract calculi or obstruction.   PELVIS:   BLADDER: Unremarkable.   REPRODUCTIVE ORGANS: Brachytherapy seeds in the prostate. No pelvic masses.   BOWEL: Colon interposition anteriorly to liver which is responsible for the questionable free air on the recent chest radiograph. Stomach and intestine are otherwise unremarkable. No intestinal wall thickening. Appendix is identified..   VESSELS: Arteriosclerotic changes. Abdominal aorta and IVC are normal in caliber.   PERITONEUM/RETROPERITONEUM/LYMPH NODES: No evidence of lymphadenopathy. Retroperitoneal is unremarkable. No free fluid or free air.   ABDOMINAL WALL: A small right inguinal hernia which contains fat and fluid.   BONES: Unremarkable. Grade 1 anterolisthesis of C4-C5. No suspicious bone lesions.       Chest 1. Infiltrate/atelectasis in the posterior lower lobes. Rule out pneumonia. No pleural effusion. 2. Mild dilatation of the ascending aorta measured at 4.3 cm in diameter. 3. Exaggerated thoracic kyphosis. 4. Cardiomegaly.   Abdomen-Pelvis 1.  No evidence of free air. Colon interposition anteriorly to the liver which corresponds to the findings noted on the recent chest radiograph. 2. Multiple renal cysts. No hydronephrosis. 3. Tiny gallstones but with no signs of acute cholecystitis 4. A small right inguinal hernia containing fat and fluid. 5. Brachytherapy seeds in the prostate.     Signed by: Noa Kwon 2/1/2024 5:59 PM Dictation workstation:   [id number]    XR chest 1 view    Result Date: 2/1/2024  Interpreted By:  Eze Ashley, STUDY:  XR CHEST 1 VIEW;  2/1/2024 2:03 pm   INDICATION: Signs/Symptoms:Chest Pain.   COMPARISON: 12/24/2023   ACCESSION NUMBER(S): [id number]   ORDERING CLINICIAN: STEFANIA MACKEY   FINDINGS: Low lung volumes with vascular crowding. Borderline heart size. Atherosclerosis. Diffuse interstitial pulmonary opacities suggestive of pulmonary edema. Questionable small left pleural effusion. Gas seen deep to the right hemidiaphragm, free air versus bowel gas.       Gas deep to the right hemidiaphragm, free air versus bowel gas. Consider follow-up with decubitus radiographs and/or CT of the abdomen and pelvis as warranted.   Cardiomegaly and pulmonary edema.   Eze Ashley discussed the significance and urgency of this critical finding by telephone with  STEFANIA MACKEY on 2/1/2024 at 2:28 pm.  (**-RCF-**) Findings:  See findings.     Signed by: Eze Ashley 2/1/2024 2:29 PM Dictation workstation:   [id number]       Assessment/Plan   Principal Problem:    Acute on chronic diastolic heart failure (CMS/HCC)  Active Problems:    Aortic stenosis    Dyspnea on exertion    Gastroesophageal reflux disease    Hard of hearing    Malignant neoplasm of prostate (CMS/HCC)    Chronic respiratory failure with hypoxia (CMS/HCC)    Essential hypertension    Acute on chronic congestive heart failure, unspecified heart failure type (CMS/HCC)  Aortic valve stenosis  Acute systolic heart failure with new diagnosis of cardiomyopathy    Plan:    Cardiology on consult giving gently diuresing, added metoprolol  Per cardiology would like patient transferred to CCU, would like 40 mg Lasix IV given now and patient started on Lasix drip at 5 mg/hour  Hemoglobin 8.2, cardiology would like patient to be transfused uPRBC, will add type and screen to be collected  Limited echo completed with decreased EF 35-40%  Added Voltaren topical has reproducible left chest wall discomfort with palpation  Cardiac diet  Maintain potassium > 4.0, magnesium >  2.0  PT evaluations appreciated, am pac 19, low intensity  Cr at or near baseline which is 1.8  Home medications resumed, cardiology is changing and adding cardiac medications  Obtain Palliative care consult  POC discussed with patient and attending  Am labs including cbc, bmp, mag  Dispo: no plans for DC over weekend, patient aware, await Dr. Mauro to see patient on Monday    Full Code    I spent >40 minutes in the professional and overall care of this patient.      Devorah Bruce, ERICUniversity [facility]als [facility]aut [facility] Center  [address] [place] [address]  Phone: [phone] Fax: [phone]

## 2024-02-03 NOTE — CARE PLAN
EOS:    No acute events this shift.    Pt remained on supplemental NC O2, pt monitored on telemetry displaying arrhythmia.      No reports of pain/discomfort this shift. Pt medicated as scheduled this shift - without issue.    Pt desat at 0400 hour, page to Hospitalist, order received and administered lasix, pt educated on benefits of medication, pt familiar with this medication. Pt on NC and satting from 88-90%.    Bed in lowest possible position, bed alarm active at all times, call light within reach, safety maintained.       The patient's goals for the shift include      The clinical goals for the shift include see care plan      Problem: Pain - Adult  Goal: Verbalizes/displays adequate comfort level or baseline comfort level  Outcome: Progressing     Problem: Safety - Adult  Goal: Free from fall injury  Outcome: Progressing     Problem: Discharge Planning  Goal: Discharge to home or other facility with appropriate resources  Outcome: Progressing     Problem: Chronic Conditions and Co-morbidities  Goal: Patient's chronic conditions and co-morbidity symptoms are monitored and maintained or improved  Outcome: Progressing     Problem: Pain  Goal: Takes deep breaths with improved pain control throughout the shift  Outcome: Progressing  Goal: Turns in bed with improved pain control throughout the shift  Outcome: Progressing  Goal: Walks with improved pain control throughout the shift  Outcome: Progressing  Goal: Performs ADL's with improved pain control throughout shift  Outcome: Progressing  Goal: Participates in PT with improved pain control throughout the shift  Outcome: Progressing  Goal: Free from opioid side effects throughout the shift  Outcome: Progressing  Goal: Free from acute confusion related to pain meds throughout the shift  Outcome: Progressing     Problem: Dressings Lower Extremities  Goal: STG - Patient to complete lower body dressing MOD I  Outcome: Progressing     Problem: Grooming  Goal: STG -  Patient completes grooming MOD I  Outcome: Progressing  Goal: STG - Patient will tolerate standing 4-8 min  Outcome: Progressing     Problem: Nutrition  Goal: Oral intake greater 75%  Outcome: Progressing  Goal: Electrolytes WNL  Outcome: Progressing  Goal: Reduce weight from edema/fluid  Outcome: Progressing

## 2024-02-03 NOTE — PROGRESS NOTES
"  Patient: José Antonio Sandra  : 1/10/1931  MRN: [id number]    Today's Date: 24  Admission Date: 2024  Hospital Day: #2    ASSESSMENT/PLAN:  Acute on chronic systolic and diastolic heart failure: Patient is worse this morning for unclear reasons.  Oxygen requirement has gone up quite a bit.  Probably will need to be on CPAP soon.  Discussed with primary team.  Would moved to CCU for IV Lasix drip and CPAP.  With his aortic stenosis age renal insufficiency I would strongly recommend palliative care consult.  We may not be able to improve him significantly.  I do not think the addition of the metoprolol because the decompensation.  I would continue that for now.  Anemia: With heart failure cardiomyopathy and aortic valve stenosis he probably would benefit from a unit of blood.  We have to be careful with volume but this might be helpful.  Acute on chronic renal insufficiency.  Renal function slightly worse today.  If renal function continues to decline prognosis will be incredibly poor.  I would hold his losartan for now.  Troponin elevation: Unsure if this is an acute coronary syndrome that caused a cardiomyopathy and chest pain or if this is a type II myocardial infarction.  Will be very difficult to tell the difference.  With his renal insufficiency and worsening heart failure cardiac catheterization would be high risk.    OVERNIGHT EVENTS:  Worsening hypoxia overnight    SUBJECTIVE:  Patient states he actually is feeling better.  His chest pain has resolved.  His shortness of breath has resolved.  But his oxygen saturation has declined overnight and again this morning.    OBJECTIVE:  VITALS: /67   Pulse 78   Temp 36.7 °C (98.1 °F)   Resp 18   Ht 1.676 m (5' 6\")   Wt 73.5 kg (162 lb 0.6 oz)   SpO2 91%   BMI 26.15 kg/m²       Intake/Output Summary (Last 24 hours) at 2/3/2024 1114  Last data filed at 2/3/2024 0536  Gross per 24 hour   Intake 240 ml   Output 1050 ml   Net -810 ml       Physical " Exam  Vitals reviewed.   Cardiovascular:      Rate and Rhythm: Normal rate and regular rhythm.      Heart sounds: Murmur heard.      Systolic murmur is present with a grade of 3/6.   Pulmonary:      Effort: Pulmonary effort is normal. No respiratory distress.      Breath sounds: Examination of the right-lower field reveals decreased breath sounds. Examination of the left-lower field reveals decreased breath sounds. Decreased breath sounds present. No wheezing or rales.   Abdominal:      General: Abdomen is flat. There is no distension.      Palpations: Abdomen is soft.   Musculoskeletal:      Right lower le+ Edema present.      Left lower le+ Edema present.   Skin:     General: Skin is warm and dry.   Neurological:      General: No focal deficit present.      Mental Status: He is alert and oriented to person, place, and time.   Psychiatric:         Mood and Affect: Mood normal.          Meds:Scheduled medications  amLODIPine, 10 mg, oral, Daily  aspirin, 81 mg, oral, Nightly  budesonide, 0.5 mg, nebulization, BID  cholecalciferol, 5,000 Units, oral, Daily  famotidine, 20 mg, oral, Nightly  ferrous sulfate (325 mg ferrous sulfate), 65 mg of iron, oral, Daily  formoterol, 20 mcg, nebulization, BID  heparin (porcine), 5,000 Units, subcutaneous, q8h  ipratropium, 0.5 mg, nebulization, TID  losartan, 50 mg, oral, Daily  metoprolol succinate XL, 25 mg, oral, Daily  polyethylene glycol, 17 g, oral, Daily  potassium chloride CR, 10 mEq, oral, Nightly  simvastatin, 40 mg, oral, Nightly  tamsulosin, 0.4 mg, oral, Daily      Continuous medications     PRN medications  PRN medications: acetaminophen, albuterol, diclofenac sodium, oxygen    Infusions:     DIAGNOSTIC DATA:  Results for orders placed or performed during the hospital encounter of 24 (from the past 24 hour(s))   Comprehensive Metabolic Panel   Result Value Ref Range    Glucose 115 (H) 74 - 99 mg/dL    Sodium 138 136 - 145 mmol/L    Potassium 4.6 3.5 -  5.3 mmol/L    Chloride 103 98 - 107 mmol/L    Bicarbonate 30 21 - 32 mmol/L    Anion Gap 10 10 - 20 mmol/L    Urea Nitrogen 34 (H) 6 - 23 mg/dL    Creatinine 1.73 (H) 0.50 - 1.30 mg/dL    eGFR 36 (L) >60 mL/min/1.73m*2    Calcium 8.4 (L) 8.6 - 10.3 mg/dL    Albumin 3.2 (L) 3.4 - 5.0 g/dL    Alkaline Phosphatase 38 33 - 136 U/L    Total Protein 5.9 (L) 6.4 - 8.2 g/dL    AST 10 9 - 39 U/L    Bilirubin, Total 0.5 0.0 - 1.2 mg/dL    ALT 9 (L) 10 - 52 U/L   CBC   Result Value Ref Range    WBC 9.9 4.4 - 11.3 x10*3/uL    nRBC 0.0 0.0 - 0.0 /100 WBCs    RBC 2.95 (L) 4.50 - 5.90 x10*6/uL    Hemoglobin 8.2 (L) 13.5 - 17.5 g/dL    Hematocrit 28.0 (L) 41.0 - 52.0 %    MCV 95 80 - 100 fL    MCH 27.8 26.0 - 34.0 pg    MCHC 29.3 (L) 32.0 - 36.0 g/dL    RDW 13.6 11.5 - 14.5 %    Platelets 120 (L) 150 - 450 x10*3/uL   Magnesium   Result Value Ref Range    Magnesium 2.14 1.60 - 2.40 mg/dL        No results found for this or any previous visit.     Armando Ricks MD

## 2024-02-03 NOTE — CONSULTS
Reason For Consult  Cardiovascular Nurse Navigator Education     History Of Present Illness  Michael Sandra is a 89 y.o. male presenting 2/1/24 with chest pain, increasing BLE edema, and URI for 3-4 days. Chest pain worsened with deep inspiration. Trop 25, 25. . BUN/creat 30/1.57 (eGFR 41). CXR showed diffuse pulmonary edema with possible free air under right hemidiaphragm, but CT chest negative for free air. 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 He is a known patient to Dr. Mauro. Dr. Ricks is seeing him currently. Echo shows lower EF than in October 2023 (35-40% down from 50-55%) along with possible worsening of aortic stenosis from moderate to likely severe (see below) but this echo is a limited echo vs the complete echo done in October.     HF GDMT: Amlodipine 10 mg daily, Losartan 50 mg daily, Metoprolol Succinate 25 mg daily (new), along with ASA, Simvastatin, and ferrous sulfate.     Past Medical History  He has a past medical history of Pain in toes of both feet (02/02/2024), Pain of toe (02/02/2024), and Personal history of malignant neoplasm of prostate (03/22/2021).    Surgical History  He has a past surgical history that includes Other surgical history (03/22/2021).     Social History  He reports that he has never smoked. He has never used smokeless tobacco. He reports that he does not drink alcohol and does not use drugs.    Family History  Family History   Problem Relation Name Age of Onset    Hypertension Mother      Heart attack Mother          Allergies  Penicillins    Review of Systems  Accurate I/O not recorded  Weight net since admit: -0.44 kg     Physical Exam  Mildly short of breath at rest but states some relief from admit, although he is still stating he's having mild left upper chest/shoulder discomfort. 1-2+ BLE edema. Abdomen soft, non-distended.      Last Recorded Vitals  Blood pressure 106/62, pulse 75, temperature 36.6 °C (97.9 °F),  "temperature source Temporal, resp. rate 18, height 1.676 m (5' 6\"), weight 72.5 kg (159 lb 12.8 oz), SpO2 92 %.    Relevant Results  Echo 2/1/24:  CONCLUSIONS:   1. Left ventricular systolic function is moderately decreased with a 35-40% estimated ejection fraction.   2. Spectral Doppler shows a pseudonormal pattern of left ventricular diastolic filling.   3. Moderate mitral valve regurgitation.   4. Moderate tricuspid regurgitation.   5. Severely elevated right ventricular systolic pressure.   6. Probably severe AS. Recommend additional measurements if clinically indicated.   7. Severe aortic valve stenosis.   8. There is moderate aortic valve cusp calcification.   9. The inferior vena cava appears moderately dilated.  10. Compared to 10/2023 study the LVEF is worse.  11. There is global hypokinesis of the left ventricle with minor regional variations.     Assessment/Plan     I met with patient this evening. He is very keen and A/O x4 with moderate hearing loss. He was eager to discuss HF and its management. Heart failure disease education, medication management, daily self-assessment including weight, BP, pulse, and symptoms, sodium and fluid restriction, importance of one-week follow-up cardiology appointment, and exercise to tolerance discussed. Patient provided with heart failure education materials including the  Living with Heart Failure Booklet and weight calendars. Barriers to self-care assessed.   He lives at home with his wife. His wife has severe arthritis and mobility issues and walks with a walker. He does the cooking and orders groceries to be delivered. He drives to appointments.   He states that he has discussed TAVR with Dr. Mauro in the past but the valve was moderate at that time. He agrees to revisit this with Dr. Mauro in the office after discharge and is aware that this echo is limited and may not be an accurate reflection at this time.  He was aware of his current medications. I " explained the new Metoprolol to him and he verbalized understanding. He already prepares their food low sodium, choosing low sodium foods to purchase and never adds salt.  Patient has a scale and BP cuff and agrees to daily self-monitoring including weight, blood pressure, heart rate, and HF symptoms. Patient now understands yellow-zone symptoms and agrees to notify cardiologist if they develop.   Patient has my contact information for any concerns/questions. I will call him after discharge to talk with his wife as well, plus I informed the patient that the wife is free to call me this weekend if she'd like the information as well.        Siena Story RN

## 2024-02-03 NOTE — DISCHARGE INSTR - DIET
Low sodium - no more than 700 mg per meal or 2000 mg per day  Limit fluids to no more than 6-8 cups per day (1.5-2 liters per day)

## 2024-02-03 NOTE — DISCHARGE INSTR - AVS FIRST PAGE
Heart Failure Discharge Instructions - SNF/ECF    1. Weigh patient daily and record.  2. If patient gains more than 2 or 3 pounds overnight or 5 pounds in 5 days, call the cardiologist Dr. Saul Mauro [phone] and consider placing in your heart failure unit.  3. Follow a low sodium diet. No more than 2000 mg in one day, or more than 700 mg per meal.  4. Limit total fluids to no more than 8 cups (or 2 liters) per day - this includes all fluids (water, coffee, juice, milk, tea, etc.)  5. Monitor blood pressure daily and record.  6. Be sure to have patient see cardiologist in one week after discharge. Call to schedule follow-up appointments.  7. Keep follow-up appointments. Send the weight and vital signs record with patient to the appointments so the doctors can see the weight trend and blood pressure readings for tighter heart failure management.  8. Activity as tolerated, advance based on tolerance.  9. If you notice subtle change of symptoms (slight increase in swelling, slight shortness of breath, a new intolerance to lying flat, a new cough), be sure to call the cardiologist and consider placing in your heart failure unit..  10. Patient has been started on Farxiga. Please provide meticulous blake care to avoid UTI's (major side effect of Farxiga).

## 2024-02-03 NOTE — SIGNIFICANT EVENT
Discussed code status with patient, including virtually zero likelihood of successful resuscitation in the event of cardiac arrest. He desires to remain full code, OK with elective intubation

## 2024-02-04 LAB
ALBUMIN SERPL BCP-MCNC: 3.1 G/DL (ref 3.4–5)
ALP SERPL-CCNC: 36 U/L (ref 33–136)
ALT SERPL W P-5'-P-CCNC: 8 U/L (ref 10–52)
ANION GAP SERPL CALC-SCNC: 13 MMOL/L (ref 10–20)
AST SERPL W P-5'-P-CCNC: 8 U/L (ref 9–39)
BILIRUB DIRECT SERPL-MCNC: 0.1 MG/DL (ref 0–0.3)
BILIRUB SERPL-MCNC: 0.5 MG/DL (ref 0–1.2)
BUN SERPL-MCNC: 39 MG/DL (ref 6–23)
CALCIUM SERPL-MCNC: 8.4 MG/DL (ref 8.6–10.3)
CHLORIDE SERPL-SCNC: 102 MMOL/L (ref 98–107)
CO2 SERPL-SCNC: 30 MMOL/L (ref 21–32)
CREAT SERPL-MCNC: 1.77 MG/DL (ref 0.5–1.3)
EGFRCR SERPLBLD CKD-EPI 2021: 35 ML/MIN/1.73M*2
ERYTHROCYTE [DISTWIDTH] IN BLOOD BY AUTOMATED COUNT: 13.7 % (ref 11.5–14.5)
GLUCOSE SERPL-MCNC: 93 MG/DL (ref 74–99)
HCT VFR BLD AUTO: 30.8 % (ref 41–52)
HGB BLD-MCNC: 9.4 G/DL (ref 13.5–17.5)
LACTATE SERPL-SCNC: 0.4 MMOL/L (ref 0.4–2)
MAGNESIUM SERPL-MCNC: 1.9 MG/DL (ref 1.6–2.4)
MCH RBC QN AUTO: 28.2 PG (ref 26–34)
MCHC RBC AUTO-ENTMCNC: 30.5 G/DL (ref 32–36)
MCV RBC AUTO: 93 FL (ref 80–100)
NRBC BLD-RTO: 0 /100 WBCS (ref 0–0)
PLATELET # BLD AUTO: 133 X10*3/UL (ref 150–450)
POTASSIUM SERPL-SCNC: 3.7 MMOL/L (ref 3.5–5.3)
PROT SERPL-MCNC: 5.9 G/DL (ref 6.4–8.2)
RBC # BLD AUTO: 3.33 X10*6/UL (ref 4.5–5.9)
SODIUM SERPL-SCNC: 141 MMOL/L (ref 136–145)
WBC # BLD AUTO: 8.6 X10*3/UL (ref 4.4–11.3)

## 2024-02-04 PROCEDURE — 2500000002 HC RX 250 W HCPCS SELF ADMINISTERED DRUGS (ALT 637 FOR MEDICARE OP, ALT 636 FOR OP/ED): Performed by: STUDENT IN AN ORGANIZED HEALTH CARE EDUCATION/TRAINING PROGRAM

## 2024-02-04 PROCEDURE — 2500000001 HC RX 250 WO HCPCS SELF ADMINISTERED DRUGS (ALT 637 FOR MEDICARE OP): Performed by: INTERNAL MEDICINE

## 2024-02-04 PROCEDURE — 94640 AIRWAY INHALATION TREATMENT: CPT

## 2024-02-04 PROCEDURE — 83605 ASSAY OF LACTIC ACID: CPT | Performed by: STUDENT IN AN ORGANIZED HEALTH CARE EDUCATION/TRAINING PROGRAM

## 2024-02-04 PROCEDURE — 82248 BILIRUBIN DIRECT: CPT | Performed by: STUDENT IN AN ORGANIZED HEALTH CARE EDUCATION/TRAINING PROGRAM

## 2024-02-04 PROCEDURE — 36415 COLL VENOUS BLD VENIPUNCTURE: CPT | Performed by: STUDENT IN AN ORGANIZED HEALTH CARE EDUCATION/TRAINING PROGRAM

## 2024-02-04 PROCEDURE — 99232 SBSQ HOSP IP/OBS MODERATE 35: CPT | Performed by: NURSE PRACTITIONER

## 2024-02-04 PROCEDURE — 99233 SBSQ HOSP IP/OBS HIGH 50: CPT | Performed by: INTERNAL MEDICINE

## 2024-02-04 PROCEDURE — 2060000001 HC INTERMEDIATE ICU ROOM DAILY

## 2024-02-04 PROCEDURE — 2500000004 HC RX 250 GENERAL PHARMACY W/ HCPCS (ALT 636 FOR OP/ED): Performed by: NURSE PRACTITIONER

## 2024-02-04 PROCEDURE — 83735 ASSAY OF MAGNESIUM: CPT | Performed by: NURSE PRACTITIONER

## 2024-02-04 PROCEDURE — 85027 COMPLETE CBC AUTOMATED: CPT | Performed by: NURSE PRACTITIONER

## 2024-02-04 PROCEDURE — 2500000004 HC RX 250 GENERAL PHARMACY W/ HCPCS (ALT 636 FOR OP/ED): Performed by: STUDENT IN AN ORGANIZED HEALTH CARE EDUCATION/TRAINING PROGRAM

## 2024-02-04 PROCEDURE — 80053 COMPREHEN METABOLIC PANEL: CPT | Performed by: STUDENT IN AN ORGANIZED HEALTH CARE EDUCATION/TRAINING PROGRAM

## 2024-02-04 PROCEDURE — 2500000001 HC RX 250 WO HCPCS SELF ADMINISTERED DRUGS (ALT 637 FOR MEDICARE OP): Performed by: STUDENT IN AN ORGANIZED HEALTH CARE EDUCATION/TRAINING PROGRAM

## 2024-02-04 RX ORDER — POTASSIUM CHLORIDE 20 MEQ/1
40 TABLET, EXTENDED RELEASE ORAL ONCE
Status: COMPLETED | OUTPATIENT
Start: 2024-02-04 | End: 2024-02-04

## 2024-02-04 RX ADMIN — IPRATROPIUM BROMIDE 0.5 MG: 0.5 SOLUTION RESPIRATORY (INHALATION) at 20:42

## 2024-02-04 RX ADMIN — BUDESONIDE 0.5 MG: 0.5 INHALANT ORAL at 10:08

## 2024-02-04 RX ADMIN — CHOLECALCIFEROL TAB 125 MCG (5000 UNIT) 5000 UNITS: 125 TAB at 09:49

## 2024-02-04 RX ADMIN — HEPARIN SODIUM 5000 UNITS: 5000 INJECTION INTRAVENOUS; SUBCUTANEOUS at 17:02

## 2024-02-04 RX ADMIN — ASPIRIN 81 MG: 81 TABLET, COATED ORAL at 20:22

## 2024-02-04 RX ADMIN — FORMOTEROL FUMARATE 20 MCG: 20 SOLUTION RESPIRATORY (INHALATION) at 20:42

## 2024-02-04 RX ADMIN — HEPARIN SODIUM 5000 UNITS: 5000 INJECTION INTRAVENOUS; SUBCUTANEOUS at 00:29

## 2024-02-04 RX ADMIN — AMLODIPINE BESYLATE 10 MG: 10 TABLET ORAL at 13:05

## 2024-02-04 RX ADMIN — POTASSIUM CHLORIDE 40 MEQ: 1500 TABLET, EXTENDED RELEASE ORAL at 13:06

## 2024-02-04 RX ADMIN — SIMVASTATIN 40 MG: 40 TABLET, FILM COATED ORAL at 20:22

## 2024-02-04 RX ADMIN — TAMSULOSIN HYDROCHLORIDE 0.4 MG: 0.4 CAPSULE ORAL at 09:50

## 2024-02-04 RX ADMIN — IPRATROPIUM BROMIDE 0.5 MG: 0.5 SOLUTION RESPIRATORY (INHALATION) at 15:10

## 2024-02-04 RX ADMIN — IPRATROPIUM BROMIDE 0.5 MG: 0.5 SOLUTION RESPIRATORY (INHALATION) at 10:08

## 2024-02-04 RX ADMIN — FERROUS SULFATE TAB 325 MG (65 MG ELEMENTAL FE) 1 TABLET: 325 (65 FE) TAB at 09:49

## 2024-02-04 RX ADMIN — METOPROLOL SUCCINATE 25 MG: 25 TABLET, EXTENDED RELEASE ORAL at 09:50

## 2024-02-04 RX ADMIN — FORMOTEROL FUMARATE 20 MCG: 20 SOLUTION RESPIRATORY (INHALATION) at 10:08

## 2024-02-04 RX ADMIN — FAMOTIDINE 20 MG: 20 TABLET ORAL at 20:22

## 2024-02-04 RX ADMIN — HEPARIN SODIUM 5000 UNITS: 5000 INJECTION INTRAVENOUS; SUBCUTANEOUS at 09:48

## 2024-02-04 RX ADMIN — BUDESONIDE 0.5 MG: 0.5 INHALANT ORAL at 20:43

## 2024-02-04 RX ADMIN — POTASSIUM CHLORIDE 10 MEQ: 1500 TABLET, EXTENDED RELEASE ORAL at 20:22

## 2024-02-04 ASSESSMENT — PAIN SCALES - GENERAL
PAINLEVEL_OUTOF10: 0 - NO PAIN

## 2024-02-04 ASSESSMENT — COGNITIVE AND FUNCTIONAL STATUS - GENERAL
TOILETING: A LOT
TOILETING: A LOT
CLIMB 3 TO 5 STEPS WITH RAILING: A LOT
PERSONAL GROOMING: A LITTLE
MOVING FROM LYING ON BACK TO SITTING ON SIDE OF FLAT BED WITH BEDRAILS: A LOT
MOVING TO AND FROM BED TO CHAIR: A LITTLE
TURNING FROM BACK TO SIDE WHILE IN FLAT BAD: A LOT
DRESSING REGULAR UPPER BODY CLOTHING: A LITTLE
EATING MEALS: A LITTLE
CLIMB 3 TO 5 STEPS WITH RAILING: A LOT
HELP NEEDED FOR BATHING: A LOT
MOVING TO AND FROM BED TO CHAIR: A LITTLE
HELP NEEDED FOR BATHING: A LOT
DRESSING REGULAR LOWER BODY CLOTHING: A LOT
STANDING UP FROM CHAIR USING ARMS: A LITTLE
DRESSING REGULAR UPPER BODY CLOTHING: A LITTLE
MOBILITY SCORE: 15
STANDING UP FROM CHAIR USING ARMS: A LITTLE
DAILY ACTIVITIY SCORE: 15
EATING MEALS: A LITTLE
DAILY ACTIVITIY SCORE: 15
MOBILITY SCORE: 15
WALKING IN HOSPITAL ROOM: A LITTLE
TURNING FROM BACK TO SIDE WHILE IN FLAT BAD: A LOT
WALKING IN HOSPITAL ROOM: A LITTLE
DRESSING REGULAR LOWER BODY CLOTHING: A LOT
PERSONAL GROOMING: A LITTLE
MOVING FROM LYING ON BACK TO SITTING ON SIDE OF FLAT BED WITH BEDRAILS: A LOT

## 2024-02-04 ASSESSMENT — PAIN - FUNCTIONAL ASSESSMENT
PAIN_FUNCTIONAL_ASSESSMENT: 0-10

## 2024-02-04 NOTE — PROGRESS NOTES
"  Patient: José Antnoio Sandra  : 1/10/1931  MRN: [id number]    Today's Date: 24  Admission Date: 2024  Hospital Day: #3    ASSESSMENT/PLAN:  Acute on chronic systolic and diastolic heart failure: Patient is stbale and perhaps a bit better. Cont lasix gtt for antoher day. I would strongly recommend palliative care consult.   Anemia: tolerated trnasfusion well. No furhter transfusoin needs.  Acute on chronic renal insufficiency.  Renal function slightly worse again today.  If renal function continues to decline prognosis will be incredibly poor.  I would continu to hold his losartan for now.  Troponin elevation: Unsure if this is an acute coronary syndrome that caused a cardiomyopathy and chest pain or if this is a type II myocardial infarction.  Will be very difficult to tell the difference.  With his renal insufficiency and worsening heart failure cardiac catheterization would be high risk and contraindicated at this point  Left sided pleural effusion - consider therapeutic thoracentesis      OVERNIGHT EVENTS:  None    SUBJECTIVE:  Pt without chest pain.  Breathing is stable.    OBJECTIVE:  VITALS: /68 (BP Location: Right arm, Patient Position: Lying)   Pulse 68   Temp 36.5 °C (97.7 °F) (Temporal)   Resp 19   Ht 1.676 m (5' 6\")   Wt 73.5 kg (162 lb 0.6 oz)   SpO2 98%   BMI 26.15 kg/m²       Intake/Output Summary (Last 24 hours) at 2024 0950  Last data filed at 2024 0500  Gross per 24 hour   Intake 576.29 ml   Output 3250 ml   Net -2673.71 ml       Physical Exam  Vitals reviewed.   Cardiovascular:      Rate and Rhythm: Normal rate and regular rhythm.      Heart sounds: Murmur heard.      Systolic murmur is present with a grade of 3/6.      Comments: tRace bilat pedal edema  Pulmonary:      Effort: Pulmonary effort is normal. No respiratory distress.      Breath sounds: Examination of the left-middle field reveals decreased breath sounds. Examination of the right-lower field reveals " decreased breath sounds. Examination of the left-lower field reveals decreased breath sounds. Decreased breath sounds present. No wheezing or rales.   Abdominal:      General: Abdomen is flat. There is no distension.      Palpations: Abdomen is soft.   Musculoskeletal:      Right lower leg: No edema.      Left lower leg: No edema.   Skin:     General: Skin is warm and dry.   Neurological:      General: No focal deficit present.      Mental Status: He is alert and oriented to person, place, and time.   Psychiatric:         Mood and Affect: Mood normal.          Meds:Scheduled medications  amLODIPine, 10 mg, oral, Daily  aspirin, 81 mg, oral, Nightly  budesonide, 0.5 mg, nebulization, BID  cholecalciferol, 5,000 Units, oral, Daily  famotidine, 20 mg, oral, Nightly  ferrous sulfate (325 mg ferrous sulfate), 65 mg of iron, oral, Daily  formoterol, 20 mcg, nebulization, BID  heparin (porcine), 5,000 Units, subcutaneous, q8h  ipratropium, 0.5 mg, nebulization, TID  [Held by provider] losartan, 50 mg, oral, Daily  metoprolol succinate XL, 25 mg, oral, Daily  polyethylene glycol, 17 g, oral, Daily  potassium chloride CR, 10 mEq, oral, Nightly  simvastatin, 40 mg, oral, Nightly  tamsulosin, 0.4 mg, oral, Daily      Continuous medications  furosemide, 5 mg/hr, Last Rate: 5 mg/hr (02/04/24 0327)      PRN medications  PRN medications: acetaminophen, albuterol, diclofenac sodium, oxygen    Infusions:furosemide, 5 mg/hr, Last Rate: 5 mg/hr (02/04/24 0327)        DIAGNOSTIC DATA:  Results for orders placed or performed during the hospital encounter of 02/01/24 (from the past 24 hour(s))   Type and screen   Result Value Ref Range    ABO TYPE A     Rh TYPE POS     ANTIBODY SCREEN NEG    Prepare RBC: 1 Units   Result Value Ref Range    PRODUCT CODE Y9131X52     Unit Number [id number]     Unit ABO A     Unit RH POS     XM INTEP COMP     Dispense Status TR     Blood Expiration Date February 09, 2024 23:59 EST     PRODUCT BLOOD  TYPE 6200     UNIT VOLUME 400    Comprehensive Metabolic Panel   Result Value Ref Range    Glucose 93 74 - 99 mg/dL    Sodium 141 136 - 145 mmol/L    Potassium 3.7 3.5 - 5.3 mmol/L    Chloride 102 98 - 107 mmol/L    Bicarbonate 30 21 - 32 mmol/L    Anion Gap 13 10 - 20 mmol/L    Urea Nitrogen 39 (H) 6 - 23 mg/dL    Creatinine 1.77 (H) 0.50 - 1.30 mg/dL    eGFR 35 (L) >60 mL/min/1.73m*2    Calcium 8.4 (L) 8.6 - 10.3 mg/dL    Albumin 3.1 (L) 3.4 - 5.0 g/dL    Alkaline Phosphatase 36 33 - 136 U/L    Total Protein 5.9 (L) 6.4 - 8.2 g/dL    AST 8 (L) 9 - 39 U/L    Bilirubin, Total 0.5 0.0 - 1.2 mg/dL    ALT 8 (L) 10 - 52 U/L   CBC   Result Value Ref Range    WBC 8.6 4.4 - 11.3 x10*3/uL    nRBC 0.0 0.0 - 0.0 /100 WBCs    RBC 3.33 (L) 4.50 - 5.90 x10*6/uL    Hemoglobin 9.4 (L) 13.5 - 17.5 g/dL    Hematocrit 30.8 (L) 41.0 - 52.0 %    MCV 93 80 - 100 fL    MCH 28.2 26.0 - 34.0 pg    MCHC 30.5 (L) 32.0 - 36.0 g/dL    RDW 13.7 11.5 - 14.5 %    Platelets 133 (L) 150 - 450 x10*3/uL   Magnesium   Result Value Ref Range    Magnesium 1.90 1.60 - 2.40 mg/dL   Lactate   Result Value Ref Range    Lactate 0.4 0.4 - 2.0 mmol/L   Bilirubin, Direct   Result Value Ref Range    Bilirubin, Direct 0.1 0.0 - 0.3 mg/dL        No results found for this or any previous visit.     Armando Ricks MD

## 2024-02-04 NOTE — CARE PLAN
Pt A&Ox4, afebrile throughout shift. Lasix gtt running at 5mg/hr, BP stable throughout night, see flowsheets. Pt with adequate, clear, yellow urine via purewick. Pt reports SOB lying flat and sometimes at rest. Pt states legs feel less swollen.     Pt currently resting in bed, lowest and locked position, with call bell in reach and bed alarm activated. Will continue to monitor for remainder of shift.           Problem: Pain - Adult  Goal: Verbalizes/displays adequate comfort level or baseline comfort level  Outcome: Progressing     Problem: Safety - Adult  Goal: Free from fall injury  Outcome: Progressing     Problem: Discharge Planning  Goal: Discharge to home or other facility with appropriate resources  Outcome: Progressing     Problem: Chronic Conditions and Co-morbidities  Goal: Patient's chronic conditions and co-morbidity symptoms are monitored and maintained or improved  Outcome: Progressing     Problem: Pain  Goal: Takes deep breaths with improved pain control throughout the shift  Outcome: Progressing  Goal: Turns in bed with improved pain control throughout the shift  Outcome: Progressing  Goal: Walks with improved pain control throughout the shift  Outcome: Progressing  Goal: Performs ADL's with improved pain control throughout shift  Outcome: Progressing  Goal: Participates in PT with improved pain control throughout the shift  Outcome: Progressing  Goal: Free from opioid side effects throughout the shift  Outcome: Progressing  Goal: Free from acute confusion related to pain meds throughout the shift  Outcome: Progressing     Problem: Dressings Lower Extremities  Goal: STG - Patient to complete lower body dressing MOD I  Outcome: Progressing     Problem: Grooming  Goal: STG - Patient completes grooming MOD I  Outcome: Progressing  Goal: STG - Patient will tolerate standing 4-8 min  Outcome: Progressing     Problem: Nutrition  Goal: Oral intake greater 75%  Outcome: Progressing  Goal: Electrolytes  WNL  Outcome: Progressing  Goal: Reduce weight from edema/fluid  Outcome: Progressing     Problem: Skin  Goal: Decreased wound size/increased tissue granulation at next dressing change  2/4/2024 0054 by Esmer Echeverria RN  Flowsheets (Taken 2/4/2024 0054)  Decreased wound size/increased tissue granulation at next dressing change: Promote sleep for wound healing  2/4/2024 0053 by Esmer Echeverria RN  Outcome: Progressing  Goal: Prevent/manage excess moisture  2/4/2024 0054 by Esmer Echeverria RN  Flowsheets (Taken 2/4/2024 0054)  Prevent/manage excess moisture: Cleanse incontinence/protect with barrier cream  2/4/2024 0053 by Esmer Echeverria RN  Outcome: Progressing  Goal: Prevent/minimize sheer/friction injuries  2/4/2024 0054 by Esmer Echeverria RN  Flowsheets (Taken 2/4/2024 0054)  Prevent/minimize sheer/friction injuries: HOB 30 degrees or less  2/4/2024 0053 by Esmer Echeverria RN  Outcome: Progressing

## 2024-02-04 NOTE — PROGRESS NOTES
"José Antonio Sandra \"Michael\" is a 89 y.o. male on day 3 of admission presenting with Acute on chronic diastolic heart failure (CMS/HCC).    Subjective   Patient seen and examined  Resting in bed, NAD  Currently on 8 L ozymizer, states he feels better today  Denies any chief complaints    Objective     Physical Exam  General: well appearing, no acute distress  HEENT:  moist mucous membranes  CV: regular rate and rhythm, aortic crescendo decrescendo murmur, 2+ pulses in all extremities  RESP: decreased throughout, on 8 l Oxymizer  Abd: soft, nontender, nondistended  Neuro: alert and oriented x3, speech appropriate  Vascular: lower extremity peripheral edema appreciated  Skin: no rashes  MSK: no joint swelling    Last Recorded Vitals  Blood pressure 124/59, pulse 74, temperature 36.5 °C (97.7 °F), temperature source Temporal, resp. rate 22, height 1.676 m (5' 6\"), weight 73.5 kg (162 lb 0.6 oz), SpO2 94 %.  Intake/Output last 3 Shifts:  I/O last 3 completed shifts:  In: 816.3 (11.1 mL/kg) [P.O.:480; I.V.:7.8 (0.1 mL/kg); Blood:328.5]  Out: 4300 (58.5 mL/kg) [Urine:4300 (1.6 mL/kg/hr)]  Weight: 73.5 kg     Relevant Results  Scheduled medications  amLODIPine, 10 mg, oral, Daily  aspirin, 81 mg, oral, Nightly  budesonide, 0.5 mg, nebulization, BID  cholecalciferol, 5,000 Units, oral, Daily  famotidine, 20 mg, oral, Nightly  ferrous sulfate (325 mg ferrous sulfate), 65 mg of iron, oral, Daily  formoterol, 20 mcg, nebulization, BID  heparin (porcine), 5,000 Units, subcutaneous, q8h  ipratropium, 0.5 mg, nebulization, TID  [Held by provider] losartan, 50 mg, oral, Daily  metoprolol succinate XL, 25 mg, oral, Daily  polyethylene glycol, 17 g, oral, Daily  potassium chloride CR, 10 mEq, oral, Nightly  simvastatin, 40 mg, oral, Nightly  tamsulosin, 0.4 mg, oral, Daily      Continuous medications  furosemide, 5 mg/hr, Last Rate: 5 mg/hr (02/04/24 0327)    PRN medications  PRN medications: acetaminophen, albuterol, diclofenac " sodium, oxygen  Results from last 7 days   Lab Units 02/04/24  0819 02/03/24  0724 02/01/24  1310   WBC AUTO x10*3/uL 8.6 9.9 12.7*   RBC AUTO x10*6/uL 3.33* 2.95* 3.53*   HEMOGLOBIN g/dL 9.4* 8.2* 9.9*       Results from last 7 days   Lab Units 02/04/24  0819 02/03/24  0724 02/02/24  0815   SODIUM mmol/L 141 138 139   POTASSIUM mmol/L 3.7 4.6 4.1   CHLORIDE mmol/L 102 103 106   CO2 mmol/L 30 30 27   BUN mg/dL 39* 34* 31*   CREATININE mg/dL 1.77* 1.73* 1.56*   CALCIUM mg/dL 8.4* 8.4* 8.2*   MAGNESIUM mg/dL 1.90 2.14 2.05   BILIRUBIN TOTAL mg/dL 0.5 0.5 0.5   ALT U/L 8* 9* 9*   AST U/L 8* 10 10         ECG 12 lead    Result Date: 2/2/2024  Sinus rhythm with Premature atrial complexes Moderate voltage criteria for LVH, may be normal variant Borderline ECG When compared with ECG of 01-FEB-2024 12:49, (unconfirmed) Premature atrial complexes are now Present Confirmed by Avtar Cm ([id number]) on 2/2/2024 1:26:52 PM    ECG 12 lead    Result Date: 2/2/2024  Sinus rhythm with marked sinus arrhythmia Moderate voltage criteria for LVH, may be normal variant Borderline ECG When compared with ECG of 24-DEC-2023 15:58, (unconfirmed) Sinus rhythm has replaced Atrial fibrillation Non-specific change in ST segment in Lateral leads Confirmed by Avtar Cm ([id number]) on 2/2/2024 1:25:38 PM    Electrocardiogram, 12-lead PRN ACS symptoms    Result Date: 2/2/2024  Sinus rhythm with marked sinus arrhythmia Moderate voltage criteria for LVH, may be normal variant Borderline ECG When compared with ECG of 24-DEC-2023 15:58, (unconfirmed) Sinus rhythm has replaced Atrial fibrillation Non-specific change in ST segment in Lateral leads    Transthoracic Echo (TTE) Limited    Result Date: 2/2/2024            [facility] [place] [address] [facility], [facility] [address]    Tel [phone] Fax [phone] TRANSTHORACIC ECHOCARDIOGRAM REPORT  Patient Name:      MICHELLE TORRES    [place] Physician:   [id number] Armando Ricks                                                              MD Study Date:        2/2/2024            Ordering Provider:   [id number] TIFFANY CHOETI MRN/PID:           [id number]            Fellow: Accession#:        [id number]        Nurse: Date of Birth/Age: 1/10/1931 / 89      Sonographer:         Ana Laura Dalton RDCS                    years Gender:            M                   Additional Staff: Height:            167.64 cm           Admit Date:          2/1/2024 Weight:            72.12 kg            Admission Status:    Inpatient - Routine BSA:               1.81 m2             Department Location: [facility] Echo Lab Blood Pressure: 131 /69 mmHg Study Type:    TRANSTHORACIC ECHO (TTE) LIMITED Diagnosis/ICD: Chest pain, unspecified-R07.9; Acute on chronic diastolic                (congestive) heart failure (CHF)-I50.33 Indication:    CP, CHF CPT Codes:     Echo Limited-22064  Study Detail: The following Echo studies were performed: 2D, M-Mode, Doppler and               color flow.  PHYSICIAN INTERPRETATION: Left Ventricle: Left ventricular systolic function is moderately decreased, with an estimated ejection fraction of 35-40%. There is global hypokinesis of the left ventricle with minor regional variations. The left ventricular cavity size is normal. Spectral Doppler shows a pseudonormal pattern of left ventricular diastolic filling. Left Atrium: The left atrium was not assessed. Right Ventricle: The right ventricle is normal in size. There is normal right ventricular global systolic function. Right Atrium: The right atrium was not assessed. Aortic Valve: The aortic valve is probably trileaflet. There is moderate aortic valve cusp calcification. There is moderate aortic valve thickening. There is evidence of severe aortic valve stenosis. There is trivial aortic valve regurgitation. Probably severe AS. Recommend additional measurements if clinically indicated. Mitral Valve: The mitral valve is normal in structure. There is moderate mitral valve  regurgitation. Tricuspid Valve: The tricuspid valve is structurally normal. There is moderate tricuspid regurgitation. The Doppler estimated RVSP is severely elevated at 66.0 mmHg. Pulmonic Valve: The pulmonic valve is structurally normal. There is no indication of pulmonic valve regurgitation. Pericardium: There is no pericardial effusion noted. Aorta: The aortic root was not assessed. Systemic Veins: The inferior vena cava appears moderately dilated. In comparison to the previous echocardiogram(s): Prior examinations are available and were reviewed for comparison purposes. Compared to 10/2023 study the LVEF is worse.  CONCLUSIONS:  1. Left ventricular systolic function is moderately decreased with a 35-40% estimated ejection fraction.  2. Spectral Doppler shows a pseudonormal pattern of left ventricular diastolic filling.  3. Moderate mitral valve regurgitation.  4. Moderate tricuspid regurgitation.  5. Severely elevated right ventricular systolic pressure.  6. Probably severe AS. Recommend additional measurements if clinically indicated.  7. Severe aortic valve stenosis.  8. There is moderate aortic valve cusp calcification.  9. The inferior vena cava appears moderately dilated. 10. Compared to 10/2023 study the LVEF is worse. 11. There is global hypokinesis of the left ventricle with minor regional variations. QUANTITATIVE DATA SUMMARY: 2D MEASUREMENTS:                           Normal Ranges: IVSd:          1.10 cm    (0.6-1.1cm) LVPWd:         1.00 cm    (0.6-1.1cm) LVIDd:         5.10 cm    (3.9-5.9cm) LVIDs:         4.00 cm LV Mass Index: 110.7 g/m2 LV % FS        21.6 % LV SYSTOLIC FUNCTION BY 2D PLANIMETRY (MOD):                     Normal Ranges: EF-A4C View: 38.0 % (>=55%) EF-A2C View: 38.9 % EF-Biplane:  38.9 % LV DIASTOLIC FUNCTION:                        Normal Ranges: MV Peak E:    1.46 m/s (0.7-1.2 m/s) MV Peak A:    1.12 m/s (0.42-0.7 m/s) E/A Ratio:    1.30     (1.0-2.2) MV e'         0.05 m/s  (>8.0) MV lateral e' 0.06 m/s MV medial e'  0.05 m/s E/e' Ratio:   27.37    (<8.0) MITRAL VALVE:                 Normal Ranges: MV DT: 174 msec (150-240msec) AORTIC VALVE:                         Normal Ranges: LVOT Max Wilner:  0.86 m/s (<=1.1m/s) LVOT VTI:      18.70 cm LVOT Diameter: 2.10 cm  (1.8-2.4cm)  RIGHT VENTRICLE: TAPSE: 20.3 mm RV s'  0.08 m/s TRICUSPID VALVE/RVSP:                             Normal Ranges: Peak TR Velocity: 3.97 m/s RV Syst Pressure: 66.0 mmHg (< 30mmHg)  [id number] Armando Ricks MD Electronically signed on 2/2/2024 at 10:28:08 AM  ** Final **     CT chest abdomen pelvis wo IV contrast    Result Date: 2/1/2024  Interpreted By:  Noa Kwon, STUDY: CT CHEST ABDOMEN PELVIS WO CONTRAST;  2/1/2024 5:02 pm   INDICATION: Signs/Symptoms:chest pain, c/f free air on CXR.   COMPARISON: Chest x-ray performed on this day.   ACCESSION NUMBER(S): [id number]   ORDERING CLINICIAN: MICKEY HONEYCUTT   TECHNIQUE: CT of the chest, abdomen and pelvis was performed. Contiguous axial images were obtained at 3 mm slice thickness through the chest, abdomen and pelvis. Coronal and sagittal reconstructions at 3 mm slice thickness were performed.  No intravenous contrast was administered; positive oral contrast was given.   FINDINGS: Please note that the study is limited without intravenous contrast. The scan is also limited by artifacts arising from the patient's arms that are kept by his sides.   CHEST:   LUNG/PLEURA/LARGE AIRWAYS: Tracheobronchial tree this patent.  There are infiltrate/atelectasis in the posterior lower lobes. No pleural effusion. No suspicious pulmonary nodules..   VESSELS: Mild dilatation of the ascending aorta measured at 4.3 cm. Pulmonary arteries normal in caliber. Dense three-vessel coronary arterial calcifications. The scan is not optimized for coronary calcium scoring.   HEART: Heart is moderately enlarged. No pericardial effusion.   MEDIASTINUM AND GLORIA: No mediastinal lymphadenopathy.  No large  hilar lymph nodes on this nonenhanced scan. Esophagus is unremarkable   CHEST WALL AND LOWER NECK: Severe thoracic kyphosis.. Thyroid is not clearly visualized.   ABDOMEN:   LIVER: Unremarkable.   BILE DUCTS: Unremarkable.   GALLBLADDER: Tiny gallstones but with no signs of acute cholecystitis..   PANCREAS: Atrophic pancreas.   SPLEEN: Unremarkable.   ADRENAL GLANDS: Unremarkable.   KIDNEYS AND URETERS: Normal in size. Multiple bilateral cysts measured up to 7 cm. No hydronephrosis. No urinary tract calculi or obstruction.   PELVIS:   BLADDER: Unremarkable.   REPRODUCTIVE ORGANS: Brachytherapy seeds in the prostate. No pelvic masses.   BOWEL: Colon interposition anteriorly to liver which is responsible for the questionable free air on the recent chest radiograph. Stomach and intestine are otherwise unremarkable. No intestinal wall thickening. Appendix is identified..   VESSELS: Arteriosclerotic changes. Abdominal aorta and IVC are normal in caliber.   PERITONEUM/RETROPERITONEUM/LYMPH NODES: No evidence of lymphadenopathy. Retroperitoneal is unremarkable. No free fluid or free air.   ABDOMINAL WALL: A small right inguinal hernia which contains fat and fluid.   BONES: Unremarkable. Grade 1 anterolisthesis of C4-C5. No suspicious bone lesions.       Chest 1. Infiltrate/atelectasis in the posterior lower lobes. Rule out pneumonia. No pleural effusion. 2. Mild dilatation of the ascending aorta measured at 4.3 cm in diameter. 3. Exaggerated thoracic kyphosis. 4. Cardiomegaly.   Abdomen-Pelvis 1.  No evidence of free air. Colon interposition anteriorly to the liver which corresponds to the findings noted on the recent chest radiograph. 2. Multiple renal cysts. No hydronephrosis. 3. Tiny gallstones but with no signs of acute cholecystitis 4. A small right inguinal hernia containing fat and fluid. 5. Brachytherapy seeds in the prostate.     Signed by: Noa Kwon 2/1/2024 5:59 PM Dictation workstation:   [id number]    XR  chest 1 view    Result Date: 2/1/2024  Interpreted By:  Eze Ashley, STUDY: XR CHEST 1 VIEW;  2/1/2024 2:03 pm   INDICATION: Signs/Symptoms:Chest Pain.   COMPARISON: 12/24/2023   ACCESSION NUMBER(S): [id number]   ORDERING CLINICIAN: STEFANIA MACKEY   FINDINGS: Low lung volumes with vascular crowding. Borderline heart size. Atherosclerosis. Diffuse interstitial pulmonary opacities suggestive of pulmonary edema. Questionable small left pleural effusion. Gas seen deep to the right hemidiaphragm, free air versus bowel gas.       Gas deep to the right hemidiaphragm, free air versus bowel gas. Consider follow-up with decubitus radiographs and/or CT of the abdomen and pelvis as warranted.   Cardiomegaly and pulmonary edema.   Eze Ashley discussed the significance and urgency of this critical finding by telephone with  STEFANIA MACKEY on 2/1/2024 at 2:28 pm.  (**-RCF-**) Findings:  See findings.     Signed by: Eze Ashley 2/1/2024 2:29 PM Dictation workstation:   [id number]       Assessment/Plan   Principal Problem:    Acute on chronic diastolic heart failure (CMS/HCC)  Active Problems:    Aortic stenosis    Dyspnea on exertion    Gastroesophageal reflux disease    Hard of hearing    Malignant neoplasm of prostate (CMS/HCC)    Chronic respiratory failure with hypoxia (CMS/HCC)    Essential hypertension    Acute on chronic congestive heart failure, unspecified heart failure type (CMS/HCC)  Aortic valve stenosis  Acute systolic heart failure with new diagnosis of cardiomyopathy    Plan:    Appears better this am in setting of patient breathing, 02 demand and peripheral edema  Transferred from medical floor to CCU yesterday for increased oxygen requirements (10 L oxymizer from patient 2-3 L nasal cannula baseline dependency) and acute on chronic systolic and diastolic heart failure  Currently on 8 L oxymizer which is down from 10 L oxymizer yesterday and overnight  Cardiology on consult placed on Lasix  drip  Received uPRBC yesterday, hemoglobin 8.2, today 9.4  Limited echo completed with decreased EF 35-40%  Consider IR for pleural effusion  Renal function worsening, monitor-cont to decline cardiology indicating prognosis poor, discussed with patient and attending yesterday, he was strict on wanting to remain FULL CODE  Obtain Palliative care consult  POC discussed with patient and attending  Am labs including cbc, bmp, mag  Dispo: Dr. Mauro returns Monday, patient's cardiologist, cont to monitor patient condition, cont Lasix drip per cardiology wean o2 as able.     Full Code    I spent >40 minutes in the professional and overall care of this patient.      Devorah Bruce, [facility]  [address] [address]  Phone: [phone] Fax: [phone]

## 2024-02-04 NOTE — NURSING NOTE
Received patient from , report from formerly [facility]. Patient on lasix gtt at 5 mg/hr, started prior to arrival. Patient on 10 L oxymizer. Patient oriented to unit, room, and call light.

## 2024-02-05 ENCOUNTER — APPOINTMENT (OUTPATIENT)
Dept: CARDIOLOGY | Facility: HOSPITAL | Age: 89
DRG: 280 | End: 2024-02-05
Payer: MEDICARE

## 2024-02-05 LAB
ALBUMIN SERPL BCP-MCNC: 3.3 G/DL (ref 3.4–5)
ANION GAP BLDA CALCULATED.4IONS-SCNC: 5 MMO/L (ref 10–25)
ANION GAP SERPL CALC-SCNC: 13 MMOL/L (ref 10–20)
ANION GAP SERPL CALC-SCNC: 14 MMOL/L (ref 10–20)
ANION GAP SERPL CALC-SCNC: 9 MMOL/L (ref 10–20)
APPARATUS: ABNORMAL
ARTERIAL PATENCY WRIST A: POSITIVE
BASE EXCESS BLDA CALC-SCNC: 5.9 MMOL/L (ref -2–3)
BODY TEMPERATURE: ABNORMAL
BUN SERPL-MCNC: 43 MG/DL (ref 6–23)
BUN SERPL-MCNC: 46 MG/DL (ref 6–23)
BUN SERPL-MCNC: 46 MG/DL (ref 6–23)
CA-I BLDA-SCNC: 1.18 MMOL/L (ref 1.1–1.33)
CALCIUM SERPL-MCNC: 8.5 MG/DL (ref 8.6–10.3)
CALCIUM SERPL-MCNC: 8.6 MG/DL (ref 8.6–10.3)
CALCIUM SERPL-MCNC: 8.6 MG/DL (ref 8.6–10.3)
CHLORIDE BLDA-SCNC: 102 MMOL/L (ref 98–107)
CHLORIDE SERPL-SCNC: 100 MMOL/L (ref 98–107)
CO2 SERPL-SCNC: 30 MMOL/L (ref 21–32)
CO2 SERPL-SCNC: 31 MMOL/L (ref 21–32)
CO2 SERPL-SCNC: 35 MMOL/L (ref 21–32)
CREAT SERPL-MCNC: 1.85 MG/DL (ref 0.5–1.3)
CREAT SERPL-MCNC: 1.88 MG/DL (ref 0.5–1.3)
CREAT SERPL-MCNC: 1.93 MG/DL (ref 0.5–1.3)
EGFRCR SERPLBLD CKD-EPI 2021: 32 ML/MIN/1.73M*2
EGFRCR SERPLBLD CKD-EPI 2021: 33 ML/MIN/1.73M*2
EGFRCR SERPLBLD CKD-EPI 2021: 34 ML/MIN/1.73M*2
ERYTHROCYTE [DISTWIDTH] IN BLOOD BY AUTOMATED COUNT: 13.5 % (ref 11.5–14.5)
FLOW: 5 LPM
GLUCOSE BLDA-MCNC: 154 MG/DL (ref 74–99)
GLUCOSE SERPL-MCNC: 107 MG/DL (ref 74–99)
GLUCOSE SERPL-MCNC: 116 MG/DL (ref 74–99)
GLUCOSE SERPL-MCNC: 129 MG/DL (ref 74–99)
HCO3 BLDA-SCNC: 33.2 MMOL/L (ref 22–26)
HCT VFR BLD AUTO: 30.4 % (ref 41–52)
HCT VFR BLD EST: 30 % (ref 41–52)
HGB BLD-MCNC: 9.2 G/DL (ref 13.5–17.5)
HGB BLDA-MCNC: 10 G/DL (ref 13.5–17.5)
INHALED O2 CONCENTRATION: 52 %
LACTATE BLDA-SCNC: 0.4 MMOL/L (ref 0.4–2)
MAGNESIUM SERPL-MCNC: 1.89 MG/DL (ref 1.6–2.4)
MAGNESIUM SERPL-MCNC: 1.98 MG/DL (ref 1.6–2.4)
MCH RBC QN AUTO: 27.9 PG (ref 26–34)
MCHC RBC AUTO-ENTMCNC: 30.3 G/DL (ref 32–36)
MCV RBC AUTO: 92 FL (ref 80–100)
NRBC BLD-RTO: 0 /100 WBCS (ref 0–0)
OXYHGB MFR BLDA: 95.4 % (ref 94–98)
PCO2 BLDA: 63 MM HG (ref 38–42)
PH BLDA: 7.33 PH (ref 7.38–7.42)
PHOSPHATE SERPL-MCNC: 4.3 MG/DL (ref 2.5–4.9)
PLATELET # BLD AUTO: 133 X10*3/UL (ref 150–450)
PO2 BLDA: 89 MM HG (ref 85–95)
POTASSIUM BLDA-SCNC: 4.1 MMOL/L (ref 3.5–5.3)
POTASSIUM SERPL-SCNC: 3.9 MMOL/L (ref 3.5–5.3)
POTASSIUM SERPL-SCNC: 4 MMOL/L (ref 3.5–5.3)
POTASSIUM SERPL-SCNC: 4 MMOL/L (ref 3.5–5.3)
RBC # BLD AUTO: 3.3 X10*6/UL (ref 4.5–5.9)
SAO2 % BLDA: 97 % (ref 94–100)
SODIUM BLDA-SCNC: 136 MMOL/L (ref 136–145)
SODIUM SERPL-SCNC: 140 MMOL/L (ref 136–145)
SPECIMEN DRAWN FROM PATIENT: ABNORMAL
WBC # BLD AUTO: 9.1 X10*3/UL (ref 4.4–11.3)

## 2024-02-05 PROCEDURE — 2500000002 HC RX 250 W HCPCS SELF ADMINISTERED DRUGS (ALT 637 FOR MEDICARE OP, ALT 636 FOR OP/ED): Performed by: STUDENT IN AN ORGANIZED HEALTH CARE EDUCATION/TRAINING PROGRAM

## 2024-02-05 PROCEDURE — 82435 ASSAY OF BLOOD CHLORIDE: CPT | Performed by: STUDENT IN AN ORGANIZED HEALTH CARE EDUCATION/TRAINING PROGRAM

## 2024-02-05 PROCEDURE — 94640 AIRWAY INHALATION TREATMENT: CPT

## 2024-02-05 PROCEDURE — 99233 SBSQ HOSP IP/OBS HIGH 50: CPT | Performed by: STUDENT IN AN ORGANIZED HEALTH CARE EDUCATION/TRAINING PROGRAM

## 2024-02-05 PROCEDURE — 83735 ASSAY OF MAGNESIUM: CPT | Performed by: STUDENT IN AN ORGANIZED HEALTH CARE EDUCATION/TRAINING PROGRAM

## 2024-02-05 PROCEDURE — 93010 ELECTROCARDIOGRAM REPORT: CPT | Performed by: INTERNAL MEDICINE

## 2024-02-05 PROCEDURE — 2500000004 HC RX 250 GENERAL PHARMACY W/ HCPCS (ALT 636 FOR OP/ED): Performed by: STUDENT IN AN ORGANIZED HEALTH CARE EDUCATION/TRAINING PROGRAM

## 2024-02-05 PROCEDURE — 36600 WITHDRAWAL OF ARTERIAL BLOOD: CPT

## 2024-02-05 PROCEDURE — 36415 COLL VENOUS BLD VENIPUNCTURE: CPT | Performed by: NURSE PRACTITIONER

## 2024-02-05 PROCEDURE — 2500000004 HC RX 250 GENERAL PHARMACY W/ HCPCS (ALT 636 FOR OP/ED): Performed by: NURSE PRACTITIONER

## 2024-02-05 PROCEDURE — 2500000001 HC RX 250 WO HCPCS SELF ADMINISTERED DRUGS (ALT 637 FOR MEDICARE OP): Performed by: STUDENT IN AN ORGANIZED HEALTH CARE EDUCATION/TRAINING PROGRAM

## 2024-02-05 PROCEDURE — 2060000001 HC INTERMEDIATE ICU ROOM DAILY

## 2024-02-05 PROCEDURE — 36415 COLL VENOUS BLD VENIPUNCTURE: CPT | Performed by: STUDENT IN AN ORGANIZED HEALTH CARE EDUCATION/TRAINING PROGRAM

## 2024-02-05 PROCEDURE — 83735 ASSAY OF MAGNESIUM: CPT | Performed by: NURSE PRACTITIONER

## 2024-02-05 PROCEDURE — 94660 CPAP INITIATION&MGMT: CPT

## 2024-02-05 PROCEDURE — 93005 ELECTROCARDIOGRAM TRACING: CPT

## 2024-02-05 PROCEDURE — 2500000001 HC RX 250 WO HCPCS SELF ADMINISTERED DRUGS (ALT 637 FOR MEDICARE OP): Performed by: INTERNAL MEDICINE

## 2024-02-05 PROCEDURE — 84132 ASSAY OF SERUM POTASSIUM: CPT | Performed by: STUDENT IN AN ORGANIZED HEALTH CARE EDUCATION/TRAINING PROGRAM

## 2024-02-05 PROCEDURE — 85027 COMPLETE CBC AUTOMATED: CPT | Performed by: NURSE PRACTITIONER

## 2024-02-05 PROCEDURE — 80069 RENAL FUNCTION PANEL: CPT | Performed by: NURSE PRACTITIONER

## 2024-02-05 PROCEDURE — 99232 SBSQ HOSP IP/OBS MODERATE 35: CPT

## 2024-02-05 RX ORDER — IPRATROPIUM BROMIDE AND ALBUTEROL SULFATE 2.5; .5 MG/3ML; MG/3ML
3 SOLUTION RESPIRATORY (INHALATION)
Status: DISCONTINUED | OUTPATIENT
Start: 2024-02-06 | End: 2024-02-10 | Stop reason: HOSPADM

## 2024-02-05 RX ORDER — ALBUTEROL SULFATE 0.83 MG/ML
2.5 SOLUTION RESPIRATORY (INHALATION) EVERY 2 HOUR PRN
Status: DISCONTINUED | OUTPATIENT
Start: 2024-02-05 | End: 2024-02-10 | Stop reason: HOSPADM

## 2024-02-05 RX ORDER — IPRATROPIUM BROMIDE AND ALBUTEROL SULFATE 2.5; .5 MG/3ML; MG/3ML
3 SOLUTION RESPIRATORY (INHALATION)
Status: DISCONTINUED | OUTPATIENT
Start: 2024-02-05 | End: 2024-02-05

## 2024-02-05 RX ADMIN — HEPARIN SODIUM 5000 UNITS: 5000 INJECTION INTRAVENOUS; SUBCUTANEOUS at 08:33

## 2024-02-05 RX ADMIN — FAMOTIDINE 20 MG: 20 TABLET ORAL at 20:03

## 2024-02-05 RX ADMIN — BUDESONIDE 0.5 MG: 0.5 INHALANT ORAL at 20:59

## 2024-02-05 RX ADMIN — IPRATROPIUM BROMIDE AND ALBUTEROL SULFATE 3 ML: 2.5; .5 SOLUTION RESPIRATORY (INHALATION) at 20:59

## 2024-02-05 RX ADMIN — IPRATROPIUM BROMIDE 0.5 MG: 0.5 SOLUTION RESPIRATORY (INHALATION) at 09:23

## 2024-02-05 RX ADMIN — BUDESONIDE 0.5 MG: 0.5 INHALANT ORAL at 09:24

## 2024-02-05 RX ADMIN — METOPROLOL SUCCINATE 25 MG: 25 TABLET, EXTENDED RELEASE ORAL at 08:33

## 2024-02-05 RX ADMIN — FORMOTEROL FUMARATE 20 MCG: 20 SOLUTION RESPIRATORY (INHALATION) at 20:59

## 2024-02-05 RX ADMIN — SIMVASTATIN 40 MG: 40 TABLET, FILM COATED ORAL at 20:03

## 2024-02-05 RX ADMIN — HEPARIN SODIUM 5000 UNITS: 5000 INJECTION INTRAVENOUS; SUBCUTANEOUS at 18:54

## 2024-02-05 RX ADMIN — FUROSEMIDE 5 MG/HR: 10 INJECTION, SOLUTION INTRAMUSCULAR; INTRAVENOUS at 08:36

## 2024-02-05 RX ADMIN — FERROUS SULFATE TAB 325 MG (65 MG ELEMENTAL FE) 1 TABLET: 325 (65 FE) TAB at 08:33

## 2024-02-05 RX ADMIN — HEPARIN SODIUM 5000 UNITS: 5000 INJECTION INTRAVENOUS; SUBCUTANEOUS at 00:15

## 2024-02-05 RX ADMIN — POTASSIUM CHLORIDE 10 MEQ: 1500 TABLET, EXTENDED RELEASE ORAL at 20:03

## 2024-02-05 RX ADMIN — FORMOTEROL FUMARATE 20 MCG: 20 SOLUTION RESPIRATORY (INHALATION) at 09:24

## 2024-02-05 RX ADMIN — ASPIRIN 81 MG: 81 TABLET, COATED ORAL at 20:03

## 2024-02-05 RX ADMIN — IPRATROPIUM BROMIDE AND ALBUTEROL SULFATE 3 ML: 2.5; .5 SOLUTION RESPIRATORY (INHALATION) at 14:15

## 2024-02-05 RX ADMIN — TAMSULOSIN HYDROCHLORIDE 0.4 MG: 0.4 CAPSULE ORAL at 08:33

## 2024-02-05 RX ADMIN — CHOLECALCIFEROL TAB 125 MCG (5000 UNIT) 5000 UNITS: 125 TAB at 08:33

## 2024-02-05 RX ADMIN — AMLODIPINE BESYLATE 10 MG: 10 TABLET ORAL at 08:33

## 2024-02-05 ASSESSMENT — COGNITIVE AND FUNCTIONAL STATUS - GENERAL
DRESSING REGULAR LOWER BODY CLOTHING: A LOT
MOVING TO AND FROM BED TO CHAIR: A LITTLE
WALKING IN HOSPITAL ROOM: A LITTLE
PERSONAL GROOMING: A LITTLE
DAILY ACTIVITIY SCORE: 15
HELP NEEDED FOR BATHING: A LOT
STANDING UP FROM CHAIR USING ARMS: A LITTLE
DRESSING REGULAR UPPER BODY CLOTHING: A LITTLE
MOBILITY SCORE: 15
CLIMB 3 TO 5 STEPS WITH RAILING: A LOT
EATING MEALS: A LITTLE
TOILETING: A LOT
TURNING FROM BACK TO SIDE WHILE IN FLAT BAD: A LOT
MOVING FROM LYING ON BACK TO SITTING ON SIDE OF FLAT BED WITH BEDRAILS: A LOT

## 2024-02-05 ASSESSMENT — PAIN SCALES - GENERAL
PAINLEVEL_OUTOF10: 0 - NO PAIN

## 2024-02-05 ASSESSMENT — PAIN - FUNCTIONAL ASSESSMENT
PAIN_FUNCTIONAL_ASSESSMENT: 0-10

## 2024-02-05 NOTE — CONSULTS
Inpatient consult to Palliative Care  Consult performed by: ERIC Mendez-CNS  Consult ordered by: ERIC Hu-CNP  Reason for consult: Heart failure, frail state of patient  Assessment/Recommendations: Patient admitted for exacerbation of heart failure. Condition is worsening as EF has decreased to 35% and patient is very symptomatic even in the presence of a Lasix gtt.           Reason For Consult  Reason for Consult: communication / medical decision making and poor prognosis     History Of Present Illness  Michael Sandra is a 89 y.o. male with past medical history of Congestive Heart Failure is presenting with cgest pain and increased edema.      Symptoms (0 - 10, Best to Worst)  Percival Symptom Assessment System  Pain Score: 0 - No pain    BM in last 48 hours? unknown         Personal/Social History   He reports that he has never smoked. He has never used smokeless tobacco. He reports that he does not drink alcohol and does not use drugs.        Caregiving/Caregiver Support  Does the patient require assistance in some or all components of his care, including coordination of medical care? Yes  If Yes, which person serves that role?  son   Caregiver emotional or practical needs:      Past Medical History  He has a past medical history of Pain in toes of both feet (02/02/2024), Pain of toe (02/02/2024), and Personal history of malignant neoplasm of prostate (03/22/2021).    Surgical History  He has a past surgical history that includes Other surgical history (03/22/2021).     Family History  Family History   Problem Relation Name Age of Onset    Hypertension Mother      Heart attack Mother       Allergies  Penicillins    Review of Systems   Unable to perform ROS: Acuity of condition        Physical Exam  Constitutional:       General: He is in acute distress.      Appearance: He is ill-appearing.   HENT:      Head: Normocephalic.      Nose: Nose normal.      Mouth/Throat:      Mouth: Mucous  "membranes are dry.      Pharynx: Oropharynx is clear.   Eyes:      Conjunctiva/sclera: Conjunctivae normal.   Cardiovascular:      Rate and Rhythm: Rhythm irregular.      Heart sounds: Murmur heard.   Pulmonary:      Effort: Respiratory distress present.      Breath sounds: Wheezing and rhonchi present.   Abdominal:      General: Abdomen is flat. Bowel sounds are normal.      Palpations: Abdomen is soft.   Musculoskeletal:      Cervical back: Normal range of motion.      Right lower leg: Edema present.      Left lower leg: Edema present.   Skin:     General: Skin is warm and dry.      Capillary Refill: Capillary refill takes 2 to 3 seconds.      Coloration: Skin is pale.   Neurological:      Mental Status: He is alert.      Comments: Patient alertness is blunted         Last Recorded Vitals  Blood pressure 168/65, pulse 82, temperature 36.1 °C (97 °F), resp. rate (!) 28, height 1.676 m (5' 6\"), weight 71.6 kg (157 lb 13.6 oz), SpO2 96 %.    Relevant Results  Results for orders placed or performed during the hospital encounter of 02/01/24 (from the past 24 hour(s))   CBC   Result Value Ref Range    WBC 9.1 4.4 - 11.3 x10*3/uL    nRBC 0.0 0.0 - 0.0 /100 WBCs    RBC 3.30 (L) 4.50 - 5.90 x10*6/uL    Hemoglobin 9.2 (L) 13.5 - 17.5 g/dL    Hematocrit 30.4 (L) 41.0 - 52.0 %    MCV 92 80 - 100 fL    MCH 27.9 26.0 - 34.0 pg    MCHC 30.3 (L) 32.0 - 36.0 g/dL    RDW 13.5 11.5 - 14.5 %    Platelets 133 (L) 150 - 450 x10*3/uL   Basic Metabolic Panel   Result Value Ref Range    Glucose 116 (H) 74 - 99 mg/dL    Sodium 140 136 - 145 mmol/L    Potassium 3.9 3.5 - 5.3 mmol/L    Chloride 100 98 - 107 mmol/L    Bicarbonate 31 21 - 32 mmol/L    Anion Gap 13 10 - 20 mmol/L    Urea Nitrogen 43 (H) 6 - 23 mg/dL    Creatinine 1.85 (H) 0.50 - 1.30 mg/dL    eGFR 34 (L) >60 mL/min/1.73m*2    Calcium 8.6 8.6 - 10.3 mg/dL   Magnesium   Result Value Ref Range    Magnesium 1.89 1.60 - 2.40 mg/dL    amLODIPine, 10 mg, oral, Daily  aspirin, 81 mg, " oral, Nightly  budesonide, 0.5 mg, nebulization, BID  cholecalciferol, 5,000 Units, oral, Daily  famotidine, 20 mg, oral, Nightly  ferrous sulfate (325 mg ferrous sulfate), 65 mg of iron, oral, Daily  formoterol, 20 mcg, nebulization, BID  heparin (porcine), 5,000 Units, subcutaneous, q8h  ipratropium, 0.5 mg, nebulization, TID  [Held by provider] losartan, 50 mg, oral, Daily  metoprolol succinate XL, 25 mg, oral, Daily  polyethylene glycol, 17 g, oral, Daily  potassium chloride CR, 10 mEq, oral, Nightly  simvastatin, 40 mg, oral, Nightly  tamsulosin, 0.4 mg, oral, Daily          Assessment/Plan   IMP:  Patient is very weak and symptomatic with heart failure. There is some concern another process may be in place as well as patient seems a little worse today as compared to yesterday. Patient did engage in conversation with short answers but then had periods where he fell asleep during conversation. Patient has tachypnea at rest and adventitious lung sounds. We did discuss Code Status and he continues to say he would want CPR and to be placed on a ventilator if necessary. Full Code remains in place.  Called son, Michael (HCPOA) with patient permission, to relay our conversation but also to inform him that patient is a bit worse today.   Plan  Patient to remain Full Code but patient and family aware of fragile condition and will adjust if they feel appropriate. Did introduce Palliative Care at home to son should Dad improve and discharge home- very interested.   Palliative Care to continue to follow and assist as patient condition warrants.     Symptom Management  Pain: denies  Medications recommended for pain?  NA  Tiredness: yes  Nausea: denies  Depression: denies  Anxiety: denies  Drowsiness: yes  Appetite: poor  Wellbeing: unable to speak to   Dyspnea: yes  Intervention recommended for dyspnea?  yes  Other: caregiver support  Intervention recommended for constipation?  NA    Provider estimate of survival: days to  weeks if patient does not begin to show Improvement.  Patient Prognostic Awareness: patient in no condition to discuss    Patient/proxy preference for information  Prefers full information    Goals of Care  At this time to support patient decision to be Full Code and continue to provide support to family patient in this tenuous situation.     Is the patient hospice-eligible?   Yes  Was a discussion held re hospice services?   no  Was a decision made re hospice services?  NA    Other Palliative Support  Caregiver support. Continue to follow.     I spent an hour in the review, care and planning for this patient.         Meera Mccoy, APRN-CNS

## 2024-02-05 NOTE — CARE PLAN
Patient remained hds throughout shift. Lasix gtt going at 0.5 mL/hr. No c/o pain. Currently on 6L oximyzer. Safety maintained. Call light within reach.

## 2024-02-05 NOTE — CARE PLAN
The patient's goals for the shift include      The clinical goals for the shift include Patients SpO2 will remain 92% or greater throughout shift.    Goal met. Pt. Is more lethargic than previous days per dr. Mauro. discharge heprin drip. Pt. Abg showed slight c02 elevation. Dr. Christiansen recommended bipap at night.

## 2024-02-05 NOTE — PROGRESS NOTES
Michael Sandra is a 89 y.o. male on day 4 of admission presenting with Acute on chronic diastolic heart failure (CMS/HCC).      Subjective   Patient appears tired today however he is arousable and interactive, answers orientation questions. He states he feels like his breathing has subjectively improved. He denies pain. Anxious to meet with Dr Mauro today.        Objective     Last Recorded Vitals  /67   Pulse 82   Temp 36.5 °C (97.7 °F) (Temporal)   Resp (!) 28   Wt 71.6 kg (157 lb 13.6 oz)   SpO2 95%   Intake/Output last 3 Shifts:    Intake/Output Summary (Last 24 hours) at 2/5/2024 1015  Last data filed at 2/5/2024 0443  Gross per 24 hour   Intake 730.73 ml   Output 2300 ml   Net -1569.27 ml       Admission Weight  Weight: 69.4 kg (153 lb) (02/01/24 1253)    Daily Weight  02/05/24 : 71.6 kg (157 lb 13.6 oz)    Physical Exam  Gen: tired, arousable and conversant, [place]  HEENT: MMM, EOMI, no scleral icterus  Neck: supple, no LAD  CV: irregularly irregular, III/VI holosystolic murmur  Lungs: diminished breath sounds over b/l bases, L>R, crackles to mid lung field over R  Abd: soft, nontender, nondistended, normoactive BS  Ext: WWP, 1+pitting edema to mid shin b/l  Skin: no rashes/lesions  Neuro: Cns III-XII grossly intact, alert, oriented to self, place, and time of day    Relevant Results          Scheduled medications  amLODIPine, 10 mg, oral, Daily  aspirin, 81 mg, oral, Nightly  budesonide, 0.5 mg, nebulization, BID  cholecalciferol, 5,000 Units, oral, Daily  famotidine, 20 mg, oral, Nightly  ferrous sulfate (325 mg ferrous sulfate), 65 mg of iron, oral, Daily  formoterol, 20 mcg, nebulization, BID  heparin (porcine), 5,000 Units, subcutaneous, q8h  ipratropium, 0.5 mg, nebulization, TID  [Held by provider] losartan, 50 mg, oral, Daily  metoprolol succinate XL, 25 mg, oral, Daily  polyethylene glycol, 17 g, oral, Daily  potassium chloride CR, 10 mEq, oral, Nightly  simvastatin, 40 mg, oral,  Nightly  tamsulosin, 0.4 mg, oral, Daily      Continuous medications  furosemide, 5 mg/hr, Last Rate: 5 mg/hr (02/05/24 0836)      PRN medications  PRN medications: acetaminophen, albuterol, diclofenac sodium, oxygen    Results for orders placed or performed during the hospital encounter of 02/01/24 (from the past 24 hour(s))   CBC   Result Value Ref Range    WBC 9.1 4.4 - 11.3 x10*3/uL    nRBC 0.0 0.0 - 0.0 /100 WBCs    RBC 3.30 (L) 4.50 - 5.90 x10*6/uL    Hemoglobin 9.2 (L) 13.5 - 17.5 g/dL    Hematocrit 30.4 (L) 41.0 - 52.0 %    MCV 92 80 - 100 fL    MCH 27.9 26.0 - 34.0 pg    MCHC 30.3 (L) 32.0 - 36.0 g/dL    RDW 13.5 11.5 - 14.5 %    Platelets 133 (L) 150 - 450 x10*3/uL   Basic Metabolic Panel   Result Value Ref Range    Glucose 116 (H) 74 - 99 mg/dL    Sodium 140 136 - 145 mmol/L    Potassium 3.9 3.5 - 5.3 mmol/L    Chloride 100 98 - 107 mmol/L    Bicarbonate 31 21 - 32 mmol/L    Anion Gap 13 10 - 20 mmol/L    Urea Nitrogen 43 (H) 6 - 23 mg/dL    Creatinine 1.85 (H) 0.50 - 1.30 mg/dL    eGFR 34 (L) >60 mL/min/1.73m*2    Calcium 8.6 8.6 - 10.3 mg/dL   Magnesium   Result Value Ref Range    Magnesium 1.89 1.60 - 2.40 mg/dL       XR chest 1 view    Result Date: 2/4/2024  Interpreted By:  Jamison Lagunas, STUDY: XR CHEST 1 VIEW;  2/3/2024 11:32 am   INDICATION: Signs/Symptoms:hypoxia.   COMPARISON: CT chest and chest radiograph 02/01/2024   ACCESSION NUMBER(S): [id number]   ORDERING CLINICIAN: EVELYNE CARRILLO   FINDINGS: AP radiograph of the chest was provided.   DEVICES: None.   CARDIOMEDIASTINAL SILHOUETTE: Cardiomediastinal silhouette is stable in size and configuration.Atherosclerotic calcifications of the aortic arch.   LUNGS: Bilateral low lung volumes. Interval worsening interstitial thickening and airspace opacities predominantly in the left mid to lower lung zones. Moderate left pleural effusion, also new since prior. No pneumothorax.   BONES: No acute osseous changes.       1.  Interval worsening since  02/01/2024.     Signed by: Jamison Lagunas 2/4/2024 12:05 PM Dictation workstation:   [id number]          Assessment/Plan   This patient currently has cardiac telemetry ordered; if you would like to modify or discontinue the telemetry order, click here to go to the orders activity to modify/discontinue the order.              Principal Problem:    Acute on chronic diastolic heart failure (CMS/HCC)  Active Problems:    Aortic stenosis    Dyspnea on exertion    Gastroesophageal reflux disease    Hard of hearing    Malignant neoplasm of prostate (CMS/HCC)    Chronic respiratory failure with hypoxia (CMS/HCC)    Essential hypertension    Acute on chronic congestive heart failure, unspecified heart failure type (CMS/HCC)    Mr Sandra is a 89yo man with COPD (bl 2L NC) pulmonary HTN, moderate to severe aortic stenosis, heart failure, ascending aortic aneurysm, DLD admitted for chest pain and acute decompensated HF. Found on TTE to have worsening ejection fraction with EF 35-40%, as well as probably severe aortic stenosis. Unfortunately, over the weekend, patient decompensated, developing acute hypoxic respiratory failure and worsening acute heart failure. He is currently on lasix gtt with slight improvement in respiratory status as of this AM. Cardiology following. Palliative care consulted given poor prognosis.     #acute systolic heart failure  #acute on chronic hypoxic respiratory failure  #severe aortic stenosis  -TTE with EF 35-40%, severe aortic stenosis, global hypokinesis LV  -patient currently on lasix gtt at 5mg/h - net neg 1.3L - appreciate cardiology guidance on dosing given uptrending sCr and continued tenuous respiratory status  -lactate, LFTs WNL  -holding losartan given uptrending sCr  -currently on 6L oxymizer satting in mid 90s  -XR with L pleural effusion - may consider thora pending progress with diuresis, GOC  -cont metoprolol succinate 25mg  -cont ASA  -cont statin  -?Afib - irregularly irregular  rhythm - follow up EKG. Would be new  -patient appears slightly more lethargic today - follow up ABG - likely BiPAP at night  -given poor prognosis - palliative care consulted  -Discussed code status with patient on Saturday - he elected to be full code  -cardiology had discussed potential cath today - NPO in the event his occurs although today with JESSICA and tenuous status      #COPD  -cont ICS, DIANE, LABA  -cont albuterol PRN     #Misc: cont rest of home meds as ordered    FEN/GI: NPO pending cardiology recs  Access: PIV  Ppx: RONNIE  Code: full         Gracy Christiansen MD

## 2024-02-05 NOTE — DOCUMENTATION CLARIFICATION NOTE
"    PATIENT:               MICHELLE TORRES  ACCT #:                  [id number]  MRN:                       [id number]  :                       1/10/1931  ADMIT DATE:       2024 1:43 PM  DISCH DATE:  RESPONDING PROVIDER #:        [id number]          PROVIDER RESPONSE TEXT:    Acute on chronic Hypoxemic Respiratory Failure    CDI QUERY TEXT:    [facility]_Respiratory Failure Acute    Instruction:    Based on your assessment of the patient and the clinical information, please provide the requested documentation by clicking on the appropriate radio button and enter any additional information if prompted.    Question: Please clarify acuity of respiratory failure as:    When answering this query, please exercise your independent professional judgment. The fact that a question is being asked, does not imply that any particular answer is desired or expected.    The patient's clinical indicators include:  Clinical Information: 89 y/o M admitted with acute on chronic systolic and diastolic HF    Clinical Indicators:    Respirs: 15-35  90-98 on 2L, 88-90 6L, 94-96 10L, 94-98 venturi mask, 93-99 10L,  4L, 95 on 4L    ED note  SLillianaHuh: \"presents with concern for chest pain.  onset of left-sided chest pain that radiates to the back.  .  Chest x-ray performed revealing diffuse pulmonary edema and concern for possible free air versus bowel gas deep to the right hemidiaphragm.  admitted for further management of his chest pain and CHF exacerbation\"    H/P  Dr. Phillips:  \"Acute on chronic diastolic heart failure, (baseline- 2 L NC), Chronic respiratory failure with hypoxia.  Nasal cannula in place, mildly coarse breath sounds at bases\"    cardiology consult  Dr. Kyle: \"Acute on chronic diastolic heart failure, severe aortic stenosis, Chronic respiratory failure with hypoxia\"    MD VILLANUEVA 2/2 Pari: \"Chronic respiratory failure with hypoxia\"    MD VILLANUEVA 2/3 Pari: \"Increased o2 requirements overnight 6 lpm nasal " "cannula up from 2 lpm nasal cannula at baseline.  RESP: bilateral basilar crackles at bases, on 6 lpm nasal cannula\"    cardiology PN 2/4 Dr. Ricks: \"Left sided pleural effusion - consider therapeutic thoracentesis\"    MD PN 2/4 Dr. Christiansen:\"Weaned from 11L oxymizer to 8L in room. Satting mid 90s. Edema improved. Patient still with decreased breath sounds over L side and crackles to mid lung field over R side. \"    Treatment: supplemental oxygen, HiFlo oxygen, venturi mask, CXR, echocardiogram, cardiology consult, albuterol, pulmicort, preforomist, atrovent, lasix drip    Risk Factors: acute on chronic CHF, chronic hypoxic respiratory failure  Options provided:  -- Acute on chronic Hypoxemic Respiratory Failure  -- chronic Hypoxemic Respiratory Failure only  -- Other - I will add my own diagnosis  -- Refer to Clinical Documentation Reviewer    Query created by: Lynda Shelley on 2/5/2024 10:51 AM      Electronically signed by:  EVELYNE CHRISTIANSEN MD 2/5/2024 1:16 PM          "

## 2024-02-05 NOTE — PROGRESS NOTES
"Physical Therapy                 Therapy Communication Note    Patient Name: José Antonio Sandra \"Michael\"  MRN: [id number]  Today's Date: 2/5/2024     Discipline: Physical Therapy    Missed Visit Reason: Missed Visit Reason: Patient sleeping    Missed Time: Attempt    Comment: Pt has increased lethargy with delayed response and not follow directions this date per nurse. Will continue to see per POC.  "

## 2024-02-05 NOTE — PROGRESS NOTES
"José Antonio Sandra \"Michael\" is a 89 y.o. male on day 4 of admission presenting with Acute on chronic diastolic heart failure (CMS/HCC).      Subjective   Patient was seen and examined today in the morning at bedside.  No overnight events.  He reports that his breathing is improved denies any chest pain palpitation or change to his urinary habits or bowel habit.       Objective     Last Recorded Vitals  /65   Pulse 82   Temp 36.1 °C (97 °F)   Resp (!) 28   Wt 71.6 kg (157 lb 13.6 oz)   SpO2 98%   Intake/Output last 3 Shifts:    Intake/Output Summary (Last 24 hours) at 2/5/2024 1451  Last data filed at 2/5/2024 0443  Gross per 24 hour   Intake 490.73 ml   Output 2300 ml   Net -1809.27 ml       Admission Weight  Weight: 69.4 kg (153 lb) (02/01/24 1253)    Daily Weight  02/05/24 : 71.6 kg (157 lb 13.6 oz)      Physical Exam:  General: ANO x 3 in mild distress with Oxymizer at 6 L  HEENT: PERRLA, head intact and normocephalic  Neck: Normal to inspection  Lungs: Decreased air entry over the bases with crackles bilateral.  Cardiac: Irregular rhythm with pansystolic murmur  Abdomen: Soft nontender, positive bowel sounds  : Exam deferred  Skin: Intact  Hematology: No petechia or excessive ecchymosis  Musculoskeletal: Bilateral pitting edema  Neurological: Alert awake oriented, no focal deficit, cranial nerves grossly intact      Relevant Results  Scheduled medications  amLODIPine, 10 mg, oral, Daily  aspirin, 81 mg, oral, Nightly  budesonide, 0.5 mg, nebulization, BID  cholecalciferol, 5,000 Units, oral, Daily  famotidine, 20 mg, oral, Nightly  ferrous sulfate (325 mg ferrous sulfate), 65 mg of iron, oral, Daily  formoterol, 20 mcg, nebulization, BID  heparin (porcine), 5,000 Units, subcutaneous, q8h  ipratropium-albuteroL, 3 mL, nebulization, q6h  [Held by provider] losartan, 50 mg, oral, Daily  metoprolol succinate XL, 25 mg, oral, Daily  polyethylene glycol, 17 g, oral, Daily  potassium chloride CR, 10 mEq, " oral, Nightly  simvastatin, 40 mg, oral, Nightly  tamsulosin, 0.4 mg, oral, Daily      Continuous medications     PRN medications  PRN medications: acetaminophen, albuterol, diclofenac sodium, oxygen     Results for orders placed or performed during the hospital encounter of 02/01/24 (from the past 24 hour(s))   CBC   Result Value Ref Range    WBC 9.1 4.4 - 11.3 x10*3/uL    nRBC 0.0 0.0 - 0.0 /100 WBCs    RBC 3.30 (L) 4.50 - 5.90 x10*6/uL    Hemoglobin 9.2 (L) 13.5 - 17.5 g/dL    Hematocrit 30.4 (L) 41.0 - 52.0 %    MCV 92 80 - 100 fL    MCH 27.9 26.0 - 34.0 pg    MCHC 30.3 (L) 32.0 - 36.0 g/dL    RDW 13.5 11.5 - 14.5 %    Platelets 133 (L) 150 - 450 x10*3/uL   Basic Metabolic Panel   Result Value Ref Range    Glucose 116 (H) 74 - 99 mg/dL    Sodium 140 136 - 145 mmol/L    Potassium 3.9 3.5 - 5.3 mmol/L    Chloride 100 98 - 107 mmol/L    Bicarbonate 31 21 - 32 mmol/L    Anion Gap 13 10 - 20 mmol/L    Urea Nitrogen 43 (H) 6 - 23 mg/dL    Creatinine 1.85 (H) 0.50 - 1.30 mg/dL    eGFR 34 (L) >60 mL/min/1.73m*2    Calcium 8.6 8.6 - 10.3 mg/dL   Magnesium   Result Value Ref Range    Magnesium 1.89 1.60 - 2.40 mg/dL   Renal function panel   Result Value Ref Range    Glucose 107 (H) 74 - 99 mg/dL    Sodium 140 136 - 145 mmol/L    Potassium 4.0 3.5 - 5.3 mmol/L    Chloride 100 98 - 107 mmol/L    Bicarbonate 30 21 - 32 mmol/L    Anion Gap 14 10 - 20 mmol/L    Urea Nitrogen 46 (H) 6 - 23 mg/dL    Creatinine 1.93 (H) 0.50 - 1.30 mg/dL    eGFR 32 (L) >60 mL/min/1.73m*2    Calcium 8.5 (L) 8.6 - 10.3 mg/dL    Phosphorus 4.3 2.5 - 4.9 mg/dL    Albumin 3.3 (L) 3.4 - 5.0 g/dL   ECG 12 Lead   Result Value Ref Range    Ventricular Rate 87 BPM    Atrial Rate 87 BPM    NY Interval 184 ms    QRS Duration 86 ms    QT Interval 374 ms    QTC Calculation(Bazett) 450 ms    P Axis 19 degrees    R Axis -22 degrees    T Axis 26 degrees    QRS Count 13 beats    Q Onset 222 ms    T Offset 409 ms    QTC Fredericia 423 ms   Blood Gas Arterial Full  Panel   Result Value Ref Range    POCT pH, Arterial 7.33 (L) 7.38 - 7.42 pH    POCT pCO2, Arterial 63 (H) 38 - 42 mm Hg    POCT pO2, Arterial 89 85 - 95 mm Hg    POCT SO2, Arterial 97 94 - 100 %    POCT Oxy Hemoglobin, Arterial 95.4 94.0 - 98.0 %    POCT Hematocrit Calculated, Arterial 30.0 (L) 41.0 - 52.0 %    POCT Sodium, Arterial 136 136 - 145 mmol/L    POCT Potassium, Arterial 4.1 3.5 - 5.3 mmol/L    POCT Chloride, Arterial 102 98 - 107 mmol/L    POCT Ionized Calcium, Arterial 1.18 1.10 - 1.33 mmol/L    POCT Glucose, Arterial 154 (H) 74 - 99 mg/dL    POCT Lactate, Arterial 0.4 0.4 - 2.0 mmol/L    POCT Base Excess, Arterial 5.9 (H) -2.0 - 3.0 mmol/L    POCT HCO3 Calculated, Arterial 33.2 (H) 22.0 - 26.0 mmol/L    POCT Hemoglobin, Arterial 10.0 (L) 13.5 - 17.5 g/dL    POCT Anion Gap, Arterial 5 (L) 10 - 25 mmo/L    Patient Temperature      FiO2 52 %    Apparatus OXIMIZER     Flow 5.0 LPM    Site of Arterial Puncture Radial Right     Ascencion's Test Positive              ECG 12 Lead    Result Date: 2/5/2024  Sinus rhythm with Premature atrial complexes Voltage criteria for left ventricular hypertrophy Nonspecific T wave abnormality Abnormal ECG When compared with ECG of 01-FEB-2024 14:23, T wave amplitude has decreased in Lateral leads    XR chest 1 view    Result Date: 2/4/2024  Interpreted By:  Jamison Lagunas, STUDY: XR CHEST 1 VIEW;  2/3/2024 11:32 am   INDICATION: Signs/Symptoms:hypoxia.   COMPARISON: CT chest and chest radiograph 02/01/2024   ACCESSION NUMBER(S): [id number]   ORDERING CLINICIAN: EVELYNE CARRILLO   FINDINGS: AP radiograph of the chest was provided.   DEVICES: None.   CARDIOMEDIASTINAL SILHOUETTE: Cardiomediastinal silhouette is stable in size and configuration.Atherosclerotic calcifications of the aortic arch.   LUNGS: Bilateral low lung volumes. Interval worsening interstitial thickening and airspace opacities predominantly in the left mid to lower lung zones. Moderate left pleural effusion,  also new since prior. No pneumothorax.   BONES: No acute osseous changes.       1.  Interval worsening since 02/01/2024.     Signed by: Jamison Lagunas 2/4/2024 12:05 PM Dictation workstation:   [id number]    Electrocardiogram, 12-lead PRN ACS symptoms    Result Date: 2/3/2024  Sinus rhythm with occasional nonconducted premature atrial complexes Moderate voltage criteria for LVH, may be normal variant Late transition Borderline ECG When compared with ECG of 24-DEC-2023 15:58, (unconfirmed) Sinus rhythm has replaced Atrial fibrillation Non-specific change in ST segment in Lateral leads Confirmed by Michael Sharma ([id number]) on 2/3/2024 10:51:26 AM    ECG 12 lead    Result Date: 2/2/2024  Sinus rhythm with Premature atrial complexes Moderate voltage criteria for LVH, may be normal variant Borderline ECG When compared with ECG of 01-FEB-2024 12:49, (unconfirmed) Premature atrial complexes are now Present Confirmed by Avtar Cm ([id number]) on 2/2/2024 1:26:52 PM    ECG 12 lead    Result Date: 2/2/2024  Sinus rhythm with marked sinus arrhythmia Moderate voltage criteria for LVH, may be normal variant Borderline ECG When compared with ECG of 24-DEC-2023 15:58, (unconfirmed) Sinus rhythm has replaced Atrial fibrillation Non-specific change in ST segment in Lateral leads Confirmed by Avtar Cm ([id number]) on 2/2/2024 1:25:38 PM    Transthoracic Echo (TTE) Limited    Result Date: 2/2/2024            [facility] [address] [address]    Tel [phone] Fax [phone] TRANSTHORACIC ECHOCARDIOGRAM REPORT  Patient Name:      MICHELLE Nuñez Physician:   [id number] Armando Ricks MD Study Date:        2/2/2024            Ordering Provider:   [id number] TIFFANY JORDAN MRN/PID:           [id number]            Fellow: Accession#:        [id number]        Nurse: Date of Birth/Age: 1/10/1931 / 89      Sonographer:         Ana Laura Dalton [facility]                     years Gender:            M                   Additional Staff: Height:            167.64 cm           Admit Date:          2/1/2024 Weight:            72.12 kg            Admission Status:    Inpatient - Routine BSA:               1.81 m2             Department Location: [facility] Echo Lab Blood Pressure: 131 /69 mmHg Study Type:    TRANSTHORACIC ECHO (TTE) LIMITED Diagnosis/ICD: Chest pain, unspecified-R07.9; Acute on chronic diastolic                (congestive) heart failure (CHF)-I50.33 Indication:    CP, CHF CPT Codes:     Echo Limited-97318  Study Detail: The following Echo studies were performed: 2D, M-Mode, Doppler and               color flow.  PHYSICIAN INTERPRETATION: Left Ventricle: Left ventricular systolic function is moderately decreased, with an estimated ejection fraction of 35-40%. There is global hypokinesis of the left ventricle with minor regional variations. The left ventricular cavity size is normal. Spectral Doppler shows a pseudonormal pattern of left ventricular diastolic filling. Left Atrium: The left atrium was not assessed. Right Ventricle: The right ventricle is normal in size. There is normal right ventricular global systolic function. Right Atrium: The right atrium was not assessed. Aortic Valve: The aortic valve is probably trileaflet. There is moderate aortic valve cusp calcification. There is moderate aortic valve thickening. There is evidence of severe aortic valve stenosis. There is trivial aortic valve regurgitation. Probably severe AS. Recommend additional measurements if clinically indicated. Mitral Valve: The mitral valve is normal in structure. There is moderate mitral valve regurgitation. Tricuspid Valve: The tricuspid valve is structurally normal. There is moderate tricuspid regurgitation. The Doppler estimated RVSP is severely elevated at 66.0 mmHg. Pulmonic Valve: The pulmonic valve is structurally normal. There is no indication of pulmonic valve regurgitation.  Pericardium: There is no pericardial effusion noted. Aorta: The aortic root was not assessed. Systemic Veins: The inferior vena cava appears moderately dilated. In comparison to the previous echocardiogram(s): Prior examinations are available and were reviewed for comparison purposes. Compared to 10/2023 study the LVEF is worse.  CONCLUSIONS:  1. Left ventricular systolic function is moderately decreased with a 35-40% estimated ejection fraction.  2. Spectral Doppler shows a pseudonormal pattern of left ventricular diastolic filling.  3. Moderate mitral valve regurgitation.  4. Moderate tricuspid regurgitation.  5. Severely elevated right ventricular systolic pressure.  6. Probably severe AS. Recommend additional measurements if clinically indicated.  7. Severe aortic valve stenosis.  8. There is moderate aortic valve cusp calcification.  9. The inferior vena cava appears moderately dilated. 10. Compared to 10/2023 study the LVEF is worse. 11. There is global hypokinesis of the left ventricle with minor regional variations. QUANTITATIVE DATA SUMMARY: 2D MEASUREMENTS:                           Normal Ranges: IVSd:          1.10 cm    (0.6-1.1cm) LVPWd:         1.00 cm    (0.6-1.1cm) LVIDd:         5.10 cm    (3.9-5.9cm) LVIDs:         4.00 cm LV Mass Index: 110.7 g/m2 LV % FS        21.6 % LV SYSTOLIC FUNCTION BY 2D PLANIMETRY (MOD):                     Normal Ranges: EF-A4C View: 38.0 % (>=55%) EF-A2C View: 38.9 % EF-Biplane:  38.9 % LV DIASTOLIC FUNCTION:                        Normal Ranges: MV Peak E:    1.46 m/s (0.7-1.2 m/s) MV Peak A:    1.12 m/s (0.42-0.7 m/s) E/A Ratio:    1.30     (1.0-2.2) MV e'         0.05 m/s (>8.0) MV lateral e' 0.06 m/s MV medial e'  0.05 m/s E/e' Ratio:   27.37    (<8.0) MITRAL VALVE:                 Normal Ranges: MV DT: 174 msec (150-240msec) AORTIC VALVE:                         Normal Ranges: LVOT Max Wilner:  0.86 m/s (<=1.1m/s) LVOT VTI:      18.70 cm LVOT Diameter: 2.10 cm   (1.8-2.4cm)  RIGHT VENTRICLE: TAPSE: 20.3 mm RV s'  0.08 m/s TRICUSPID VALVE/RVSP:                             Normal Ranges: Peak TR Velocity: 3.97 m/s RV Syst Pressure: 66.0 mmHg (< 30mmHg)  [id number] Armando Ricks MD Electronically signed on 2/2/2024 at 10:28:08 AM  ** Final **     CT chest abdomen pelvis wo IV contrast    Result Date: 2/1/2024  Interpreted By:  Noa Kwon, STUDY: CT CHEST ABDOMEN PELVIS WO CONTRAST;  2/1/2024 5:02 pm   INDICATION: Signs/Symptoms:chest pain, c/f free air on CXR.   COMPARISON: Chest x-ray performed on this day.   ACCESSION NUMBER(S): [id number]   ORDERING CLINICIAN: MICKEY HONEYCUTT   TECHNIQUE: CT of the chest, abdomen and pelvis was performed. Contiguous axial images were obtained at 3 mm slice thickness through the chest, abdomen and pelvis. Coronal and sagittal reconstructions at 3 mm slice thickness were performed.  No intravenous contrast was administered; positive oral contrast was given.   FINDINGS: Please note that the study is limited without intravenous contrast. The scan is also limited by artifacts arising from the patient's arms that are kept by his sides.   CHEST:   LUNG/PLEURA/LARGE AIRWAYS: Tracheobronchial tree this patent.  There are infiltrate/atelectasis in the posterior lower lobes. No pleural effusion. No suspicious pulmonary nodules..   VESSELS: Mild dilatation of the ascending aorta measured at 4.3 cm. Pulmonary arteries normal in caliber. Dense three-vessel coronary arterial calcifications. The scan is not optimized for coronary calcium scoring.   HEART: Heart is moderately enlarged. No pericardial effusion.   MEDIASTINUM AND GLORIA: No mediastinal lymphadenopathy.  No large hilar lymph nodes on this nonenhanced scan. Esophagus is unremarkable   CHEST WALL AND LOWER NECK: Severe thoracic kyphosis.. Thyroid is not clearly visualized.   ABDOMEN:   LIVER: Unremarkable.   BILE DUCTS: Unremarkable.   GALLBLADDER: Tiny gallstones but with no signs of acute cholecystitis..    PANCREAS: Atrophic pancreas.   SPLEEN: Unremarkable.   ADRENAL GLANDS: Unremarkable.   KIDNEYS AND URETERS: Normal in size. Multiple bilateral cysts measured up to 7 cm. No hydronephrosis. No urinary tract calculi or obstruction.   PELVIS:   BLADDER: Unremarkable.   REPRODUCTIVE ORGANS: Brachytherapy seeds in the prostate. No pelvic masses.   BOWEL: Colon interposition anteriorly to liver which is responsible for the questionable free air on the recent chest radiograph. Stomach and intestine are otherwise unremarkable. No intestinal wall thickening. Appendix is identified..   VESSELS: Arteriosclerotic changes. Abdominal aorta and IVC are normal in caliber.   PERITONEUM/RETROPERITONEUM/LYMPH NODES: No evidence of lymphadenopathy. Retroperitoneal is unremarkable. No free fluid or free air.   ABDOMINAL WALL: A small right inguinal hernia which contains fat and fluid.   BONES: Unremarkable. Grade 1 anterolisthesis of C4-C5. No suspicious bone lesions.       Chest 1. Infiltrate/atelectasis in the posterior lower lobes. Rule out pneumonia. No pleural effusion. 2. Mild dilatation of the ascending aorta measured at 4.3 cm in diameter. 3. Exaggerated thoracic kyphosis. 4. Cardiomegaly.   Abdomen-Pelvis 1.  No evidence of free air. Colon interposition anteriorly to the liver which corresponds to the findings noted on the recent chest radiograph. 2. Multiple renal cysts. No hydronephrosis. 3. Tiny gallstones but with no signs of acute cholecystitis 4. A small right inguinal hernia containing fat and fluid. 5. Brachytherapy seeds in the prostate.     Signed by: Noa Kwon 2/1/2024 5:59 PM Dictation workstation:   [id number]    XR chest 1 view    Result Date: 2/1/2024  Interpreted By:  Eze Ashley, STUDY: XR CHEST 1 VIEW;  2/1/2024 2:03 pm   INDICATION: Signs/Symptoms:Chest Pain.   COMPARISON: 12/24/2023   ACCESSION NUMBER(S): [id number]   ORDERING CLINICIAN: STEFANIA MACKEY   FINDINGS: Low lung volumes with vascular  "crowding. Borderline heart size. Atherosclerosis. Diffuse interstitial pulmonary opacities suggestive of pulmonary edema. Questionable small left pleural effusion. Gas seen deep to the right hemidiaphragm, free air versus bowel gas.       Gas deep to the right hemidiaphragm, free air versus bowel gas. Consider follow-up with decubitus radiographs and/or CT of the abdomen and pelvis as warranted.   Cardiomegaly and pulmonary edema.   Eze Ashley discussed the significance and urgency of this critical finding by telephone with  STEFANIA MACKEY on 2/1/2024 at 2:28 pm.  (**-RCF-**) Findings:  See findings.     Signed by: Eze Ashley 2/1/2024 2:29 PM Dictation workstation:   [id number]       José Antonio Sandra \"Michael\" is a 89 y.o. male on day 4 of admission presenting with Acute on chronic diastolic heart failure (CMS/HCC).  Assessment/Plan   This patient currently has cardiac telemetry ordered; if you would like to modify or discontinue the telemetry order, click here to go to the orders activity to modify/discontinue the order.  Principal Problem:    Acute on chronic diastolic heart failure (CMS/HCC)  Active Problems:    Aortic stenosis    Dyspnea on exertion    Gastroesophageal reflux disease    Hard of hearing    Malignant neoplasm of prostate (CMS/HCC)    Chronic respiratory failure with hypoxia (CMS/HCC)    Essential hypertension    Acute on chronic congestive heart failure, unspecified heart failure type (CMS/HCC)    Acute on chronic systolic/diastolic heart failure   Severe aortic stenosis  Type II MI  New HFrEF with EF 35 to 40%  Troponinemia secondary to demand ischemia  Pulmonary hypertension  Left sided pleural effusion   Plan  - In setting of renal insufficiency and worsening respiratory status, patient is a poor candidate for cardiac catheterization considered high risk.  - Continue conservative management  - Continue to hold losartan for worsening renal function  - Palliative care consulted "     The note is not complete, pending discussion with Dr. Mauro regarding assessment and plan.     Aneesh Kyle MD   Internal Medicine, PGY-1 .

## 2024-02-06 LAB
ANION GAP SERPL CALC-SCNC: 13 MMOL/L (ref 10–20)
BUN SERPL-MCNC: 54 MG/DL (ref 6–23)
CALCIUM SERPL-MCNC: 8.6 MG/DL (ref 8.6–10.3)
CHLORIDE SERPL-SCNC: 101 MMOL/L (ref 98–107)
CO2 SERPL-SCNC: 34 MMOL/L (ref 21–32)
CREAT SERPL-MCNC: 1.95 MG/DL (ref 0.5–1.3)
EGFRCR SERPLBLD CKD-EPI 2021: 31 ML/MIN/1.73M*2
ERYTHROCYTE [DISTWIDTH] IN BLOOD BY AUTOMATED COUNT: 13 % (ref 11.5–14.5)
GLUCOSE SERPL-MCNC: 124 MG/DL (ref 74–99)
HCT VFR BLD AUTO: 33.3 % (ref 41–52)
HGB BLD-MCNC: 9.8 G/DL (ref 13.5–17.5)
MAGNESIUM SERPL-MCNC: 2.15 MG/DL (ref 1.6–2.4)
MCH RBC QN AUTO: 27.7 PG (ref 26–34)
MCHC RBC AUTO-ENTMCNC: 29.4 G/DL (ref 32–36)
MCV RBC AUTO: 94 FL (ref 80–100)
NRBC BLD-RTO: 0 /100 WBCS (ref 0–0)
PLATELET # BLD AUTO: 141 X10*3/UL (ref 150–450)
POTASSIUM SERPL-SCNC: 4.5 MMOL/L (ref 3.5–5.3)
RBC # BLD AUTO: 3.54 X10*6/UL (ref 4.5–5.9)
SODIUM SERPL-SCNC: 143 MMOL/L (ref 136–145)
WBC # BLD AUTO: 6.7 X10*3/UL (ref 4.4–11.3)

## 2024-02-06 PROCEDURE — 97112 NEUROMUSCULAR REEDUCATION: CPT | Mod: GP,CQ

## 2024-02-06 PROCEDURE — 5A09357 ASSISTANCE WITH RESPIRATORY VENTILATION, LESS THAN 24 CONSECUTIVE HOURS, CONTINUOUS POSITIVE AIRWAY PRESSURE: ICD-10-PCS | Performed by: INTERNAL MEDICINE

## 2024-02-06 PROCEDURE — 2060000001 HC INTERMEDIATE ICU ROOM DAILY

## 2024-02-06 PROCEDURE — 2500000004 HC RX 250 GENERAL PHARMACY W/ HCPCS (ALT 636 FOR OP/ED)

## 2024-02-06 PROCEDURE — 99232 SBSQ HOSP IP/OBS MODERATE 35: CPT | Performed by: STUDENT IN AN ORGANIZED HEALTH CARE EDUCATION/TRAINING PROGRAM

## 2024-02-06 PROCEDURE — 2500000005 HC RX 250 GENERAL PHARMACY W/O HCPCS: Performed by: STUDENT IN AN ORGANIZED HEALTH CARE EDUCATION/TRAINING PROGRAM

## 2024-02-06 PROCEDURE — 99232 SBSQ HOSP IP/OBS MODERATE 35: CPT | Performed by: NURSE PRACTITIONER

## 2024-02-06 PROCEDURE — 97530 THERAPEUTIC ACTIVITIES: CPT | Mod: GO,CO

## 2024-02-06 PROCEDURE — 83735 ASSAY OF MAGNESIUM: CPT | Performed by: STUDENT IN AN ORGANIZED HEALTH CARE EDUCATION/TRAINING PROGRAM

## 2024-02-06 PROCEDURE — 85027 COMPLETE CBC AUTOMATED: CPT | Performed by: STUDENT IN AN ORGANIZED HEALTH CARE EDUCATION/TRAINING PROGRAM

## 2024-02-06 PROCEDURE — 2500000002 HC RX 250 W HCPCS SELF ADMINISTERED DRUGS (ALT 637 FOR MEDICARE OP, ALT 636 FOR OP/ED): Performed by: STUDENT IN AN ORGANIZED HEALTH CARE EDUCATION/TRAINING PROGRAM

## 2024-02-06 PROCEDURE — 94660 CPAP INITIATION&MGMT: CPT

## 2024-02-06 PROCEDURE — 36415 COLL VENOUS BLD VENIPUNCTURE: CPT | Performed by: NURSE PRACTITIONER

## 2024-02-06 PROCEDURE — 94640 AIRWAY INHALATION TREATMENT: CPT

## 2024-02-06 PROCEDURE — 2500000004 HC RX 250 GENERAL PHARMACY W/ HCPCS (ALT 636 FOR OP/ED): Performed by: STUDENT IN AN ORGANIZED HEALTH CARE EDUCATION/TRAINING PROGRAM

## 2024-02-06 PROCEDURE — 2500000001 HC RX 250 WO HCPCS SELF ADMINISTERED DRUGS (ALT 637 FOR MEDICARE OP): Performed by: INTERNAL MEDICINE

## 2024-02-06 PROCEDURE — 2500000001 HC RX 250 WO HCPCS SELF ADMINISTERED DRUGS (ALT 637 FOR MEDICARE OP): Performed by: STUDENT IN AN ORGANIZED HEALTH CARE EDUCATION/TRAINING PROGRAM

## 2024-02-06 PROCEDURE — 36415 COLL VENOUS BLD VENIPUNCTURE: CPT | Performed by: STUDENT IN AN ORGANIZED HEALTH CARE EDUCATION/TRAINING PROGRAM

## 2024-02-06 PROCEDURE — 80048 BASIC METABOLIC PNL TOTAL CA: CPT | Performed by: NURSE PRACTITIONER

## 2024-02-06 PROCEDURE — 97530 THERAPEUTIC ACTIVITIES: CPT | Mod: GP,CQ

## 2024-02-06 RX ORDER — FAMOTIDINE 20 MG/1
10 TABLET, FILM COATED ORAL NIGHTLY
Status: DISCONTINUED | OUTPATIENT
Start: 2024-02-06 | End: 2024-02-10 | Stop reason: HOSPADM

## 2024-02-06 RX ADMIN — IPRATROPIUM BROMIDE AND ALBUTEROL SULFATE 3 ML: 2.5; .5 SOLUTION RESPIRATORY (INHALATION) at 20:39

## 2024-02-06 RX ADMIN — Medication 6 L/MIN: at 12:27

## 2024-02-06 RX ADMIN — BUDESONIDE 0.5 MG: 0.5 INHALANT ORAL at 09:56

## 2024-02-06 RX ADMIN — AMLODIPINE BESYLATE 10 MG: 10 TABLET ORAL at 12:31

## 2024-02-06 RX ADMIN — HEPARIN SODIUM 5000 UNITS: 5000 INJECTION INTRAVENOUS; SUBCUTANEOUS at 00:02

## 2024-02-06 RX ADMIN — CHOLECALCIFEROL TAB 125 MCG (5000 UNIT) 5000 UNITS: 125 TAB at 09:35

## 2024-02-06 RX ADMIN — POLYETHYLENE GLYCOL 3350 17 G: 17 POWDER, FOR SOLUTION ORAL at 09:35

## 2024-02-06 RX ADMIN — TAMSULOSIN HYDROCHLORIDE 0.4 MG: 0.4 CAPSULE ORAL at 09:35

## 2024-02-06 RX ADMIN — FERROUS SULFATE TAB 325 MG (65 MG ELEMENTAL FE) 1 TABLET: 325 (65 FE) TAB at 09:35

## 2024-02-06 RX ADMIN — ASPIRIN 81 MG: 81 TABLET, COATED ORAL at 20:01

## 2024-02-06 RX ADMIN — HEPARIN SODIUM 5000 UNITS: 5000 INJECTION INTRAVENOUS; SUBCUTANEOUS at 09:33

## 2024-02-06 RX ADMIN — FAMOTIDINE 10 MG: 20 TABLET, FILM COATED ORAL at 20:01

## 2024-02-06 RX ADMIN — SIMVASTATIN 40 MG: 40 TABLET, FILM COATED ORAL at 20:02

## 2024-02-06 RX ADMIN — FORMOTEROL FUMARATE 20 MCG: 20 SOLUTION RESPIRATORY (INHALATION) at 09:56

## 2024-02-06 RX ADMIN — METOPROLOL SUCCINATE 25 MG: 25 TABLET, EXTENDED RELEASE ORAL at 09:34

## 2024-02-06 RX ADMIN — POTASSIUM CHLORIDE 10 MEQ: 1500 TABLET, EXTENDED RELEASE ORAL at 20:01

## 2024-02-06 RX ADMIN — BUDESONIDE 0.5 MG: 0.5 INHALANT ORAL at 20:39

## 2024-02-06 RX ADMIN — IPRATROPIUM BROMIDE AND ALBUTEROL SULFATE 3 ML: 2.5; .5 SOLUTION RESPIRATORY (INHALATION) at 16:46

## 2024-02-06 RX ADMIN — HEPARIN SODIUM 5000 UNITS: 5000 INJECTION INTRAVENOUS; SUBCUTANEOUS at 15:59

## 2024-02-06 RX ADMIN — IPRATROPIUM BROMIDE AND ALBUTEROL SULFATE 3 ML: 2.5; .5 SOLUTION RESPIRATORY (INHALATION) at 09:57

## 2024-02-06 RX ADMIN — FORMOTEROL FUMARATE 20 MCG: 20 SOLUTION RESPIRATORY (INHALATION) at 20:39

## 2024-02-06 ASSESSMENT — COGNITIVE AND FUNCTIONAL STATUS - GENERAL
STANDING UP FROM CHAIR USING ARMS: A LITTLE
TURNING FROM BACK TO SIDE WHILE IN FLAT BAD: A LOT
MOVING TO AND FROM BED TO CHAIR: A LOT
TOILETING: A LOT
WALKING IN HOSPITAL ROOM: A LOT
DRESSING REGULAR LOWER BODY CLOTHING: A LOT
CLIMB 3 TO 5 STEPS WITH RAILING: A LOT
STANDING UP FROM CHAIR USING ARMS: A LOT
MOVING TO AND FROM BED TO CHAIR: A LITTLE
WALKING IN HOSPITAL ROOM: A LOT
TURNING FROM BACK TO SIDE WHILE IN FLAT BAD: A LOT
PERSONAL GROOMING: A LITTLE
PERSONAL GROOMING: A LITTLE
HELP NEEDED FOR BATHING: A LOT
DRESSING REGULAR UPPER BODY CLOTHING: A LITTLE
DAILY ACTIVITIY SCORE: 15
CLIMB 3 TO 5 STEPS WITH RAILING: A LOT
MOBILITY SCORE: 14
WALKING IN HOSPITAL ROOM: A LOT
TOILETING: A LOT
DRESSING REGULAR UPPER BODY CLOTHING: A LITTLE
TURNING FROM BACK TO SIDE WHILE IN FLAT BAD: A LOT
MOVING FROM LYING ON BACK TO SITTING ON SIDE OF FLAT BED WITH BEDRAILS: A LOT
CLIMB 3 TO 5 STEPS WITH RAILING: A LOT
MOVING FROM LYING ON BACK TO SITTING ON SIDE OF FLAT BED WITH BEDRAILS: A LOT
EATING MEALS: A LITTLE
HELP NEEDED FOR BATHING: A LOT
MOVING FROM LYING ON BACK TO SITTING ON SIDE OF FLAT BED WITH BEDRAILS: A LOT
MOVING TO AND FROM BED TO CHAIR: A LOT
DRESSING REGULAR LOWER BODY CLOTHING: A LOT
STANDING UP FROM CHAIR USING ARMS: A LOT
EATING MEALS: A LITTLE
MOBILITY SCORE: 12
MOBILITY SCORE: 12
DAILY ACTIVITIY SCORE: 15

## 2024-02-06 ASSESSMENT — PAIN - FUNCTIONAL ASSESSMENT
PAIN_FUNCTIONAL_ASSESSMENT: 0-10

## 2024-02-06 ASSESSMENT — PAIN SCALES - GENERAL
PAINLEVEL_OUTOF10: 0 - NO PAIN

## 2024-02-06 ASSESSMENT — ACTIVITIES OF DAILY LIVING (ADL): EFFECT OF PAIN ON DAILY ACTIVITIES: .

## 2024-02-06 NOTE — PROGRESS NOTES
"Physical Therapy    Physical Therapy    Physical Therapy Treatment    Patient Name: José Antonio Sandra \"Michael\"  MRN: [id number]  Today's Date: 2/6/2024  Time Calculation  Start Time: 1352  Stop Time: 1424  Time Calculation (min): 32 min        02/06/24 1352   PT  Visit   PT Received On 02/06/24   Response to Previous Treatment Patient with no complaints from previous session.   General   Prior to Session Communication Bedside nurse   Patient Position Received Bed, 3 rail up;Alarm on   General Comment Very [place]- agreeable to therapy and cleared for participation.   Precautions   Medical Precautions Fall precautions   Vital Signs   SpO2   (96-98% 6L)   Pain Assessment   Pain Assessment 0-10   Pain Score 0 - No pain   Effect of Pain on Daily Activities .   Cognition   Overall Cognitive Status WFL   Therapeutic Exercise   Therapeutic Exercise Performed Yes   Therapeutic Exercise Activity 1 AP/LAQ/march 2x10   Balance/Neuromuscular Re-Education   Balance/Neuromuscular Re-Education Activity Performed Yes   Balance/Neuromuscular Re-Education Activity 1 seated and standing balance trials. CGA for seated balance with unilatetal UE support. minAx2, max cues to faciliatet upight posture. Pt presents with cervical kyphosis.   Bed Mobility   Bed Mobility Yes   Bed Mobility 1   Bed Mobility 1 Supine to sitting   Level of Assistance 1 Maximum assistance;Moderate verbal cues;Moderate tactile cues  (x2)   Bed Mobility Comments 1 max cues for sequencing and safety   Ambulation/Gait Training   Ambulation/Gait Training Performed Yes   Ambulation/Gait Training 1   Surface 1 Level tile   Device 1 Rolling walker   Gait Support Devices Gait belt   Assistance 1 Minimum assistance;Moderate verbal cues;Moderate tactile cues  (x2)   Quality of Gait 1 Diminished heel strike;Decreased step length;Shuffling gait   Comments/Distance (ft) 1 8' short shuffling steps, max cues for safety and sequencing with fair follow through   Transfers   Transfer Yes "   Transfer 1   Transfer From 1 Bed to   Transfer to 1 Chair with arms   Technique 1 Sit to stand;Stand to sit   Transfer Device 1 Walker;Gait belt   Transfer Level of Assistance 1 Minimum assistance;Moderate verbal cues;Moderate tactile cues;+2   Trials/Comments 1 cues for hand placement and sequencing   Activity Tolerance   Endurance Tolerates 10 - 20 min exercise with multiple rests   PT Assessment   PT Assessment Results Decreased strength;Decreased endurance;Impaired balance;Decreased mobility   Rehab Prognosis Good   End of Session Communication Bedside nurse   Assessment Comment Pt is motivated to get out of bed and move. Pt is minAx2 for most activities when upright. MaxAx2 for bed mobility.   End of Session Patient Position Up in chair;Alarm on   PT Plan   Inpatient/Swing Bed or Outpatient Inpatient     Outcome Measures:  [facility] Basic Mobility  Turning from your back to your side while in a flat bed without using bedrails: A lot  Moving from lying on your back to sitting on the side of a flat bed without using bedrails: A lot  Moving to and from bed to chair (including a wheelchair): A lot  Standing up from a chair using your arms (e.g. wheelchair or bedside chair): A lot  To walk in hospital room: A lot  Climbing 3-5 steps with railing: A lot  Basic Mobility - Total Score: 12                             EDUCATION:  Outpatient Education  Individual(s) Educated: Patient  Education Provided: Fall Risk  Education Documentation  Mobility Training, taught by Gloria Dukes PTA at 2/6/2024  2:57 PM.  Learner: Family, Patient  Readiness: Acceptance  Method: Explanation  Response: Verbalizes Understanding, Demonstrated Understanding, Needs Reinforcement    Education Comments  No comments found.        GOALS:  Encounter Problems       Encounter Problems (Active)       PT Problem       Bed mobility (Progressing)       Start:  02/02/24    Expected End:  02/10/24       Pt will perform supine<>sit with HOB flat, MARTIN.            Transfers (Progressing)       Start:  02/02/24    Expected End:  02/10/24       Pt will perform all transfers with LRAD, MARTIN.            Gait (Progressing)       Start:  02/02/24    Expected End:  02/10/24       Pt will amb 150' with LRAD, MARTIN with reciprocal gait, upright posture and improved activity tolerance as demonstrated by vitals.           Balance (Progressing)       Start:  02/02/24    Expected End:  02/10/24       Pt will tolerate standing x2 min without BUE support, SBAx1.           Strength (Progressing)       Start:  02/02/24    Expected End:  02/10/24       Pt will improve gross BLE strength to 4-/5.            Pain - Adult          Transfers       STG - Patient will perform bed mobility MOD I       Start:  02/02/24    Expected End:  02/10/24            STG - Patient will perform toilet transfer MOD I       Start:  02/02/24    Expected End:  02/10/24

## 2024-02-06 NOTE — PROGRESS NOTES
"Michael Sandra is a 89 y.o. male on day 5 of admission presenting with Acute on chronic diastolic heart failure (CMS/HCC).    Subjective      Met with pt's HPOA and multiple other family members at length. Pt resting on 6L with Oxymizer, pt required Bi-Pap overnight. Discussed care plan moving forward including Hospice vs Palliative, Hospice IPU ,at home, and at facility. Also discussed SNF and PT/ OT. SNF list given to family. Pt previously adamant that he wishes to remain Full Code. Family would like to discuss code status with pt privately.  Family considering all options for care SNF vs Hospice vs home with [facility], they are agreeable to continue goals of care discussions. Will follow.     Objective     Physical Exam    Last Recorded Vitals  Blood pressure 117/59, pulse 74, temperature 36.6 °C (97.9 °F), temperature source Temporal, resp. rate 24, height 1.676 m (5' 6\"), weight 70 kg (154 lb 5.2 oz), SpO2 95 %.  Intake/Output last 3 Shifts:  I/O last 3 completed shifts:  In: 343.8 (4.9 mL/kg) [P.O.:340; I.V.:3.8 (0.1 mL/kg)]  Out: 1400 (20 mL/kg) [Urine:1400 (0.6 mL/kg/hr)]  Weight: 70 kg     Relevant Results           This patient currently has cardiac telemetry ordered; if you would like to modify or discontinue the telemetry order, click here to go to the orders activity to modify/discontinue the order.                 Assessment/Plan   Principal Problem:    Acute on chronic diastolic heart failure (CMS/HCC)  Active Problems:    Aortic stenosis    Dyspnea on exertion    Gastroesophageal reflux disease    Hard of hearing    Malignant neoplasm of prostate (CMS/HCC)    Chronic respiratory failure with hypoxia (CMS/HCC)    Essential hypertension    Acute on chronic congestive heart failure, unspecified heart failure type (CMS/HCC)             Kym Narvaez LCSW      "

## 2024-02-06 NOTE — CARE PLAN
Problem: Safety - Adult  Goal: Free from fall injury  2/6/2024 1820 by Tamica Matos RN  Outcome: Progressing  2/6/2024 1820 by Tamica Matos RN  Reactivated     Problem: Skin  Goal: Prevent/manage excess moisture  Recent Flowsheet Documentation  Taken 2/6/2024 1820 by Tamica Matos RN  Prevent/manage excess moisture: Moisturize dry skin     Problem: Skin  Goal: Prevent/manage excess moisture  2/6/2024 1820 by Tamica Matos RN  Outcome: Progressing  Flowsheets (Taken 2/6/2024 1820)  Prevent/manage excess moisture: Moisturize dry skin  2/6/2024 1820 by Tamica Matos RN  Reactivated  Flowsheets (Taken 2/6/2024 1820)  Prevent/manage excess moisture: Moisturize dry skin   The patient's goals for the shift include  to decrease the amount of oxygen.    The clinical goals for the shift include Patient's Spo2 will re    Over the shift, the patient did not make progress toward the following goals. Patient on 6l oxymizer this shift. Patient up in chair for couple hours today, tolerated well. Appetite better today, eating and drinking 50% of meals.

## 2024-02-06 NOTE — CARE PLAN
Patient placed on BIPAP last night; SpO2 % throughout shift. No c.o pain or SOB. Safety maintained. Call light within reach.

## 2024-02-06 NOTE — PROGRESS NOTES
Michael Sandra is a 89 y.o. male on day 5 of admission presenting with Acute on chronic diastolic heart failure (CMS/HCC).      Subjective   Patient seen and examined  Family at bedside, indicates they believe patient is more active and alert today  Patient wore bipap HS overnight, currently on 4 L ozymizer  Denies any complaints  Discussed with son at bedside, discharge planning    Objective     Last Recorded Vitals  /59 (BP Location: Right arm, Patient Position: Lying)   Pulse 74   Temp 36.6 °C (97.9 °F) (Temporal)   Resp 24   Wt 70 kg (154 lb 5.2 oz)   SpO2 95%   Intake/Output last 3 Shifts:    Intake/Output Summary (Last 24 hours) at 2/6/2024 1134  Last data filed at 2/6/2024 0500  Gross per 24 hour   Intake 100 ml   Output 200 ml   Net -100 ml         Admission Weight  Weight: 69.4 kg (153 lb) (02/01/24 1253)    Daily Weight  02/06/24 : 70 kg (154 lb 5.2 oz)    Physical Exam  Gen: tired, arousable and conversant, [place]  HEENT: MMM, EOMI, no scleral icterus  Neck: supple, no LAD  CV: irregularly irregular, III/VI holosystolic murmur  Lungs: diminished breath sounds over b/l bases, L>R, crackles to mid lung field over R  Abd: soft, nontender, nondistended, normoactive BS  Ext: WWP, 1+pitting edema to mid shin b/l  Skin: no rashes/lesions  Neuro: Cns III-XII grossly intact, alert, oriented to self, place, and time of day    Scheduled medications  amLODIPine, 10 mg, oral, Daily  aspirin, 81 mg, oral, Nightly  budesonide, 0.5 mg, nebulization, BID  cholecalciferol, 5,000 Units, oral, Daily  famotidine, 20 mg, oral, Nightly  ferrous sulfate (325 mg ferrous sulfate), 65 mg of iron, oral, Daily  formoterol, 20 mcg, nebulization, BID  heparin (porcine), 5,000 Units, subcutaneous, q8h  ipratropium-albuteroL, 3 mL, nebulization, 4x daily  [Held by provider] losartan, 50 mg, oral, Daily  metoprolol succinate XL, 25 mg, oral, Daily  polyethylene glycol, 17 g, oral, Daily  potassium chloride CR, 10 mEq, oral,  Nightly  simvastatin, 40 mg, oral, Nightly  tamsulosin, 0.4 mg, oral, Daily      Continuous medications       PRN medications  PRN medications: acetaminophen, albuterol, diclofenac sodium, oxygen, oxygen    Results for orders placed or performed during the hospital encounter of 02/01/24 (from the past 24 hour(s))   Blood Gas Arterial Full Panel   Result Value Ref Range    POCT pH, Arterial 7.33 (L) 7.38 - 7.42 pH    POCT pCO2, Arterial 63 (H) 38 - 42 mm Hg    POCT pO2, Arterial 89 85 - 95 mm Hg    POCT SO2, Arterial 97 94 - 100 %    POCT Oxy Hemoglobin, Arterial 95.4 94.0 - 98.0 %    POCT Hematocrit Calculated, Arterial 30.0 (L) 41.0 - 52.0 %    POCT Sodium, Arterial 136 136 - 145 mmol/L    POCT Potassium, Arterial 4.1 3.5 - 5.3 mmol/L    POCT Chloride, Arterial 102 98 - 107 mmol/L    POCT Ionized Calcium, Arterial 1.18 1.10 - 1.33 mmol/L    POCT Glucose, Arterial 154 (H) 74 - 99 mg/dL    POCT Lactate, Arterial 0.4 0.4 - 2.0 mmol/L    POCT Base Excess, Arterial 5.9 (H) -2.0 - 3.0 mmol/L    POCT HCO3 Calculated, Arterial 33.2 (H) 22.0 - 26.0 mmol/L    POCT Hemoglobin, Arterial 10.0 (L) 13.5 - 17.5 g/dL    POCT Anion Gap, Arterial 5 (L) 10 - 25 mmo/L    Patient Temperature      FiO2 52 %    Apparatus OXIMIZER     Flow 5.0 LPM    Site of Arterial Puncture Radial Right     Ascenicon's Test Positive    Basic metabolic panel   Result Value Ref Range    Glucose 129 (H) 74 - 99 mg/dL    Sodium 140 136 - 145 mmol/L    Potassium 4.0 3.5 - 5.3 mmol/L    Chloride 100 98 - 107 mmol/L    Bicarbonate 35 (H) 21 - 32 mmol/L    Anion Gap 9 (L) 10 - 20 mmol/L    Urea Nitrogen 46 (H) 6 - 23 mg/dL    Creatinine 1.88 (H) 0.50 - 1.30 mg/dL    eGFR 33 (L) >60 mL/min/1.73m*2    Calcium 8.6 8.6 - 10.3 mg/dL   Magnesium   Result Value Ref Range    Magnesium 1.98 1.60 - 2.40 mg/dL   Magnesium   Result Value Ref Range    Magnesium 2.15 1.60 - 2.40 mg/dL   CBC   Result Value Ref Range    WBC 6.7 4.4 - 11.3 x10*3/uL    nRBC 0.0 0.0 - 0.0 /100 WBCs     RBC 3.54 (L) 4.50 - 5.90 x10*6/uL    Hemoglobin 9.8 (L) 13.5 - 17.5 g/dL    Hematocrit 33.3 (L) 41.0 - 52.0 %    MCV 94 80 - 100 fL    MCH 27.7 26.0 - 34.0 pg    MCHC 29.4 (L) 32.0 - 36.0 g/dL    RDW 13.0 11.5 - 14.5 %    Platelets 141 (L) 150 - 450 x10*3/uL   Basic Metabolic Panel   Result Value Ref Range    Glucose 124 (H) 74 - 99 mg/dL    Sodium 143 136 - 145 mmol/L    Potassium 4.5 3.5 - 5.3 mmol/L    Chloride 101 98 - 107 mmol/L    Bicarbonate 34 (H) 21 - 32 mmol/L    Anion Gap 13 10 - 20 mmol/L    Urea Nitrogen 54 (H) 6 - 23 mg/dL    Creatinine 1.95 (H) 0.50 - 1.30 mg/dL    eGFR 31 (L) >60 mL/min/1.73m*2    Calcium 8.6 8.6 - 10.3 mg/dL       XR chest 1 view    Result Date: 2/4/2024  Interpreted By:  Jamison Lagunas, STUDY: XR CHEST 1 VIEW;  2/3/2024 11:32 am   INDICATION: Signs/Symptoms:hypoxia.   COMPARISON: CT chest and chest radiograph 02/01/2024   ACCESSION NUMBER(S): [id number]   ORDERING CLINICIAN: EVELYNE CARRILLO   FINDINGS: AP radiograph of the chest was provided.   DEVICES: None.   CARDIOMEDIASTINAL SILHOUETTE: Cardiomediastinal silhouette is stable in size and configuration.Atherosclerotic calcifications of the aortic arch.   LUNGS: Bilateral low lung volumes. Interval worsening interstitial thickening and airspace opacities predominantly in the left mid to lower lung zones. Moderate left pleural effusion, also new since prior. No pneumothorax.   BONES: No acute osseous changes.       1.  Interval worsening since 02/01/2024.     Signed by: Jamison Lagunas 2/4/2024 12:05 PM Dictation workstation:   [id number]         Assessment/Plan     Principal Problem:    Acute on chronic diastolic heart failure (CMS/HCC)  Active Problems:    Aortic stenosis    Dyspnea on exertion    Gastroesophageal reflux disease    Hard of hearing    Malignant neoplasm of prostate (CMS/HCC)    Chronic respiratory failure with hypoxia (CMS/HCC)    Essential hypertension    Acute on chronic congestive heart failure,  unspecified heart failure type (CMS/[facility])    Mr Sandra is a 89yo man with COPD (bl 2L NC) pulmonary HTN, moderate to severe aortic stenosis, heart failure, ascending aortic aneurysm, DLD admitted for chest pain and acute decompensated HF. Found on TTE to have worsening ejection fraction with EF 35-40%, as well as probably severe aortic stenosis. Unfortunately, over the weekend, patient decompensated, developing acute hypoxic respiratory failure and worsening acute heart failure. He is currently on lasix gtt with slight improvement in respiratory status as of this AM. Cardiology following. Palliative care consulted given poor prognosis.     #acute systolic heart failure  #acute on chronic hypoxic respiratory failure  #severe aortic stenosis  #COPD    Plan:    -TTE with EF 35-40%, severe aortic stenosis, global hypokinesis LV  -Lasix drip discontinued yesterday  -slightly worsened kidney function from yesterday, hold diuretics today  -Wore BiPAP HS and currently on 4 L oxymizer, bicarb currently 34, mentation did improve from yesterday  -Cardiology following:  In setting of renal insufficiency and worsening respiratory status, patient is a poor candidate for cardiac catheterization considered high risk. Given his age and his comorbidities I believe that his life expectancy is less than 6 months.   -cont holding losartan given uptrending sCr  -XR with L pleural effusion - may consider thora pending progress with diuresis, GOC  -cont metoprolol succinate 25mg  -cont ASA,cont statin, cont ICS, DIANE, LABA,-cont albuterol PRN  -Misc: cont rest of home meds as ordered  -cardiac diet  -am labs including cbc, bmp, mag  -POC discussed with patient, patient's son and family, and attending at bedside  -PT OT, will need re evaluation for discharge planning  -Discussed code status with patient on Saturday - he elected to be full code    Code: full    ERIC [facility]  [facility]  [id number] [place] [address]  Phone: [phone] Fax: [phone]

## 2024-02-06 NOTE — PROGRESS NOTES
"Occupational Therapy    OT Treatment    Patient Name: José Antonio Sandra \"Dionte"  MRN: [id number]  Today's Date: 2/6/2024  Time Calculation  Start Time: 1355  Stop Time: 1424  Time Calculation (min): 29 min         Assessment:  Pt is very pleasant, cooperative and eager for OOB activity, pt pleased to be up in chair. Max A x2 for bed mobility, however Min A x2 for functional transfers with fww and gait belt. Rest breaks and cues for breathing throughout, SpO2 95-97% during tx.    End of Session Communication: Bedside nurse  End of Session Patient Position: Up in chair, Alarm on (multiple family members present at end of tx)       Plan:  OT Frequency: 3 times per week  Equipment Recommended upon Discharge: Wheelchair  At this time pt is requiring increased assist with bed mobility and transfers; anticipate pt may benefit from moderate intensity therapy at a facility once cleared for discharge prior to returning to [facility] to increase safety, balance and activity tolerance needed for functional transfers and ADLs.      Subjective      02/06/24 1355   OT Last Visit   OT Received On 02/06/24   General   Prior to Session Communication Bedside nurse   Patient Position Received Bed, 3 rail up   General Comment Pt agreeable to tx and cleared for participation.   Precautions   Medical Precautions Fall precautions;Oxygen therapy device and L/min   Precautions Comment Pt on 6L oxymizer; very [place], requires increased volume   Vital Signs   Heart Rate 68   Heart Rate Source Monitor   SpO2   (95-97% during activity; 100% end of tx resting in chair)   /55   BP Location Right arm   BP Method Automatic   Patient Position Lying   Pain Assessment   Pain Assessment 0-10   Pain Score 0 - No pain   Cognition   Overall Cognitive Status WFL   Functional Standing Tolerance   Time >1 minute   Activity static standing   Functional Standing Tolerance Comments B UE support   Bed Mobility 1   Bed Mobility 1 Supine to sitting   Level of Assistance " 1 Maximum assistance  (x2)   Bed Mobility Comments 1 HOB elevated, max cues for technique, use of draw sheet to assist scooting out toward EOB.   Transfer 1   Technique 1 Sit to stand;Stand to sit   Transfer Device 1 Walker;Gait belt   Transfer Level of Assistance 1 Minimum assistance;+2;Moderate verbal cues;Moderate tactile cues   Trials/Comments 1 STS from EOB and recliner surfaces with cues for proper UE/LE placement with fair follow through. Pt ambulated few turning steps bed>recliner placed on R side due to multiple lines, Min A x2 with cues for technqiue and safety with fww   Static Sitting Balance   Static Sitting-Balance Support Bilateral upper extremity supported   Static Sitting-Level of Assistance Contact guard   Static Sitting-Comment/Number of Minutes fair   Static Standing Balance   Static Standing-Balance Support Bilateral upper extremity supported   Static Standing-Level of Assistance Minimum assistance   Static Standing-Comment/Number of Minutes fair/fair-   Dynamic Standing Balance   Dynamic Standing-Balance Support Bilateral upper extremity supported   Dynamic Standing-Comments Min A, fair-   Therapeutic Activity   Therapeutic Activity Performed Yes   Therapeutic Activity 1 Pt engaged in static sitting and standing activities with emphasis on upright posture, breathing throughout with fair follow through, CGA while seated, Min A x1-2 in stance for safety.   Therapeutic Activity 2 Pt demo's cervical flexion in which family reports use of pool noodle at home for support. Pt provide with towel roll-up for support while seated in recliner; pt reports improved comfort.   IP OT Assessment   End of Session Communication Bedside nurse   End of Session Patient Position Up in chair;Alarm on  (multiple family members present at end of tx)   Inpatient Plan   OT Frequency 3 times per week   Equipment Recommended upon Discharge Wheelchair     Outcome Measures:[facility] Daily Activity  Putting on and taking off  regular lower body clothing: A lot  Bathing (including washing, rinsing, drying): A lot  Putting on and taking off regular upper body clothing: A little  Toileting, which includes using toilet, bedpan or urinal: A lot  Taking care of personal grooming such as brushing teeth: A little  Eating Meals: A little  Daily Activity - Total Score: 15  Education Documentation  Body Mechanics, taught by MARIVEL Hollingsworth at 2/6/2024  3:22 PM.  Learner: Patient  Readiness: Acceptance  Method: Explanation  Response: Verbalizes Understanding  Comment: functional transfer safety with pacing and slow positional changes, emphasis on breathing throughout activity    ADL Training, taught by MARIVEL Hollingsworth at 2/6/2024  3:22 PM.  Learner: Patient  Readiness: Acceptance  Method: Explanation  Response: Verbalizes Understanding  Comment: functional transfer safety with pacing and slow positional changes, emphasis on breathing throughout activity    Education Comments  No comments found.            Goals:  Encounter Problems       Encounter Problems (Active)       Dressings Lower Extremities       STG - Patient to complete lower body dressing MOD I (Progressing)       Start:  02/02/24    Expected End:  02/07/24               Grooming       STG - Patient completes grooming MOD I (Progressing)       Start:  02/02/24    Expected End:  02/07/24            STG - Patient will tolerate standing 4-8 min (Progressing)       Start:  02/02/24    Expected End:  02/07/24               Transfers       STG - Patient will perform bed mobility MOD I       Start:  02/02/24    Expected End:  02/10/24            STG - Patient will perform toilet transfer MOD I       Start:  02/02/24    Expected End:  02/10/24

## 2024-02-06 NOTE — PROGRESS NOTES
PHARMACIST CONSULT  RENAL DOSING ADJUSTMENT    Patient Name: José Antonio Sandra  MRN: [id number]  Admission Date: 2/1/2024  1:43 PM  Date/Time of Consult: 02/06/24 at 2:08 PM    In accordance with the inpatient pharmacy renal dose adjustment consult agreement the following medication changes have been made:  Famotidine: currently ordered 20 mg q24hr, will be adjusted to 10 mg q24hr      Results from last 72 hours   Lab Units 02/06/24  0936 02/05/24  1418 02/05/24  0620   CREATININE mg/dL 1.95* 1.88* 1.93*  1.85*   BUN mg/dL 54* 46* 46*  43*       Serum creatinine: 1.95 mg/dL (H) 02/06/24 0936  Estimated creatinine clearance: 21.4 mL/min (A)      Per consult agreement, pharmacy will order related labs, evaluate results, and adjust dosing as it pertains to the drug therapy being managed.  Thank you for allowing me to participate in the care for this patient.    Signature: Cali Marcano, PharmD  02/06/24 at 2:08 PM

## 2024-02-07 LAB
ANION GAP SERPL CALC-SCNC: 10 MMOL/L (ref 10–20)
BNP SERPL-MCNC: 383 PG/ML (ref 0–99)
BUN SERPL-MCNC: 59 MG/DL (ref 6–23)
CALCIUM SERPL-MCNC: 8.2 MG/DL (ref 8.6–10.3)
CHLORIDE SERPL-SCNC: 99 MMOL/L (ref 98–107)
CO2 SERPL-SCNC: 33 MMOL/L (ref 21–32)
CREAT SERPL-MCNC: 1.83 MG/DL (ref 0.5–1.3)
EGFRCR SERPLBLD CKD-EPI 2021: 34 ML/MIN/1.73M*2
ERYTHROCYTE [DISTWIDTH] IN BLOOD BY AUTOMATED COUNT: 13.2 % (ref 11.5–14.5)
GLUCOSE SERPL-MCNC: 110 MG/DL (ref 74–99)
HCT VFR BLD AUTO: 28.9 % (ref 41–52)
HGB BLD-MCNC: 8.7 G/DL (ref 13.5–17.5)
MAGNESIUM SERPL-MCNC: 2.18 MG/DL (ref 1.6–2.4)
MCH RBC QN AUTO: 27.8 PG (ref 26–34)
MCHC RBC AUTO-ENTMCNC: 30.1 G/DL (ref 32–36)
MCV RBC AUTO: 92 FL (ref 80–100)
NRBC BLD-RTO: 0 /100 WBCS (ref 0–0)
PHOSPHATE SERPL-MCNC: 3.7 MG/DL (ref 2.5–4.9)
PLATELET # BLD AUTO: 129 X10*3/UL (ref 150–450)
POTASSIUM SERPL-SCNC: 4.9 MMOL/L (ref 3.5–5.3)
RBC # BLD AUTO: 3.13 X10*6/UL (ref 4.5–5.9)
SODIUM SERPL-SCNC: 137 MMOL/L (ref 136–145)
WBC # BLD AUTO: 9.3 X10*3/UL (ref 4.4–11.3)

## 2024-02-07 PROCEDURE — 2500000001 HC RX 250 WO HCPCS SELF ADMINISTERED DRUGS (ALT 637 FOR MEDICARE OP): Performed by: INTERNAL MEDICINE

## 2024-02-07 PROCEDURE — 2060000001 HC INTERMEDIATE ICU ROOM DAILY

## 2024-02-07 PROCEDURE — 36415 COLL VENOUS BLD VENIPUNCTURE: CPT | Performed by: NURSE PRACTITIONER

## 2024-02-07 PROCEDURE — 2500000001 HC RX 250 WO HCPCS SELF ADMINISTERED DRUGS (ALT 637 FOR MEDICARE OP): Performed by: STUDENT IN AN ORGANIZED HEALTH CARE EDUCATION/TRAINING PROGRAM

## 2024-02-07 PROCEDURE — 83735 ASSAY OF MAGNESIUM: CPT | Performed by: NURSE PRACTITIONER

## 2024-02-07 PROCEDURE — 2500000002 HC RX 250 W HCPCS SELF ADMINISTERED DRUGS (ALT 637 FOR MEDICARE OP, ALT 636 FOR OP/ED): Performed by: STUDENT IN AN ORGANIZED HEALTH CARE EDUCATION/TRAINING PROGRAM

## 2024-02-07 PROCEDURE — 99232 SBSQ HOSP IP/OBS MODERATE 35: CPT | Performed by: NURSE PRACTITIONER

## 2024-02-07 PROCEDURE — 2500000004 HC RX 250 GENERAL PHARMACY W/ HCPCS (ALT 636 FOR OP/ED)

## 2024-02-07 PROCEDURE — 99232 SBSQ HOSP IP/OBS MODERATE 35: CPT

## 2024-02-07 PROCEDURE — 97110 THERAPEUTIC EXERCISES: CPT | Mod: GP,CQ

## 2024-02-07 PROCEDURE — 85027 COMPLETE CBC AUTOMATED: CPT | Performed by: NURSE PRACTITIONER

## 2024-02-07 PROCEDURE — 94640 AIRWAY INHALATION TREATMENT: CPT

## 2024-02-07 PROCEDURE — 82374 ASSAY BLOOD CARBON DIOXIDE: CPT | Performed by: NURSE PRACTITIONER

## 2024-02-07 PROCEDURE — 97112 NEUROMUSCULAR REEDUCATION: CPT | Mod: GP,CQ

## 2024-02-07 PROCEDURE — 84100 ASSAY OF PHOSPHORUS: CPT | Performed by: NURSE PRACTITIONER

## 2024-02-07 PROCEDURE — 83880 ASSAY OF NATRIURETIC PEPTIDE: CPT

## 2024-02-07 PROCEDURE — 99232 SBSQ HOSP IP/OBS MODERATE 35: CPT | Performed by: STUDENT IN AN ORGANIZED HEALTH CARE EDUCATION/TRAINING PROGRAM

## 2024-02-07 PROCEDURE — 2500000004 HC RX 250 GENERAL PHARMACY W/ HCPCS (ALT 636 FOR OP/ED): Performed by: STUDENT IN AN ORGANIZED HEALTH CARE EDUCATION/TRAINING PROGRAM

## 2024-02-07 PROCEDURE — 94660 CPAP INITIATION&MGMT: CPT

## 2024-02-07 RX ORDER — SENNOSIDES 8.6 MG/1
1 TABLET ORAL NIGHTLY
Status: DISCONTINUED | OUTPATIENT
Start: 2024-02-07 | End: 2024-02-10 | Stop reason: HOSPADM

## 2024-02-07 RX ORDER — TORSEMIDE 20 MG/1
10 TABLET ORAL DAILY
Status: DISCONTINUED | OUTPATIENT
Start: 2024-02-08 | End: 2024-02-10 | Stop reason: HOSPADM

## 2024-02-07 RX ORDER — TORSEMIDE 20 MG/1
10 TABLET ORAL DAILY
Status: DISCONTINUED | OUTPATIENT
Start: 2024-02-07 | End: 2024-02-07

## 2024-02-07 RX ADMIN — POLYETHYLENE GLYCOL 3350 17 G: 17 POWDER, FOR SOLUTION ORAL at 10:01

## 2024-02-07 RX ADMIN — POTASSIUM CHLORIDE 10 MEQ: 1500 TABLET, EXTENDED RELEASE ORAL at 20:08

## 2024-02-07 RX ADMIN — BUDESONIDE 0.5 MG: 0.5 INHALANT ORAL at 08:43

## 2024-02-07 RX ADMIN — HEPARIN SODIUM 5000 UNITS: 5000 INJECTION INTRAVENOUS; SUBCUTANEOUS at 08:14

## 2024-02-07 RX ADMIN — ASPIRIN 81 MG: 81 TABLET, COATED ORAL at 20:07

## 2024-02-07 RX ADMIN — SIMVASTATIN 40 MG: 40 TABLET, FILM COATED ORAL at 20:07

## 2024-02-07 RX ADMIN — CHOLECALCIFEROL TAB 125 MCG (5000 UNIT) 5000 UNITS: 125 TAB at 10:03

## 2024-02-07 RX ADMIN — FERROUS SULFATE TAB 325 MG (65 MG ELEMENTAL FE) 1 TABLET: 325 (65 FE) TAB at 10:03

## 2024-02-07 RX ADMIN — IPRATROPIUM BROMIDE AND ALBUTEROL SULFATE 3 ML: 2.5; .5 SOLUTION RESPIRATORY (INHALATION) at 14:14

## 2024-02-07 RX ADMIN — FAMOTIDINE 10 MG: 20 TABLET, FILM COATED ORAL at 20:09

## 2024-02-07 RX ADMIN — HEPARIN SODIUM 5000 UNITS: 5000 INJECTION INTRAVENOUS; SUBCUTANEOUS at 00:13

## 2024-02-07 RX ADMIN — HEPARIN SODIUM 5000 UNITS: 5000 INJECTION INTRAVENOUS; SUBCUTANEOUS at 16:49

## 2024-02-07 RX ADMIN — HEPARIN SODIUM 5000 UNITS: 5000 INJECTION INTRAVENOUS; SUBCUTANEOUS at 23:22

## 2024-02-07 RX ADMIN — TAMSULOSIN HYDROCHLORIDE 0.4 MG: 0.4 CAPSULE ORAL at 10:02

## 2024-02-07 RX ADMIN — SENNOSIDES 8.6 MG: 8.6 TABLET, FILM COATED ORAL at 20:07

## 2024-02-07 RX ADMIN — FORMOTEROL FUMARATE 20 MCG: 20 SOLUTION RESPIRATORY (INHALATION) at 20:25

## 2024-02-07 RX ADMIN — BUDESONIDE 0.5 MG: 0.5 INHALANT ORAL at 20:25

## 2024-02-07 RX ADMIN — IPRATROPIUM BROMIDE AND ALBUTEROL SULFATE 3 ML: 2.5; .5 SOLUTION RESPIRATORY (INHALATION) at 18:11

## 2024-02-07 RX ADMIN — FORMOTEROL FUMARATE 20 MCG: 20 SOLUTION RESPIRATORY (INHALATION) at 08:43

## 2024-02-07 RX ADMIN — METOPROLOL SUCCINATE 25 MG: 25 TABLET, EXTENDED RELEASE ORAL at 10:01

## 2024-02-07 ASSESSMENT — COGNITIVE AND FUNCTIONAL STATUS - GENERAL
CLIMB 3 TO 5 STEPS WITH RAILING: A LOT
TURNING FROM BACK TO SIDE WHILE IN FLAT BAD: A LITTLE
MOVING FROM LYING ON BACK TO SITTING ON SIDE OF FLAT BED WITH BEDRAILS: A LOT
WALKING IN HOSPITAL ROOM: A LOT
EATING MEALS: A LITTLE
CLIMB 3 TO 5 STEPS WITH RAILING: A LOT
MOBILITY SCORE: 12
WALKING IN HOSPITAL ROOM: A LOT
DRESSING REGULAR LOWER BODY CLOTHING: A LITTLE
STANDING UP FROM CHAIR USING ARMS: A LOT
MOVING TO AND FROM BED TO CHAIR: A LOT
DRESSING REGULAR UPPER BODY CLOTHING: A LOT
DAILY ACTIVITIY SCORE: 14
MOVING TO AND FROM BED TO CHAIR: A LOT
TOILETING: A LOT
MOBILITY SCORE: 15
PERSONAL GROOMING: A LOT
HELP NEEDED FOR BATHING: A LOT
STANDING UP FROM CHAIR USING ARMS: A LOT
TURNING FROM BACK TO SIDE WHILE IN FLAT BAD: A LOT

## 2024-02-07 ASSESSMENT — PAIN SCALES - GENERAL
PAINLEVEL_OUTOF10: 0 - NO PAIN

## 2024-02-07 ASSESSMENT — PAIN - FUNCTIONAL ASSESSMENT
PAIN_FUNCTIONAL_ASSESSMENT: 0-10

## 2024-02-07 ASSESSMENT — ACTIVITIES OF DAILY LIVING (ADL): EFFECT OF PAIN ON DAILY ACTIVITIES: .

## 2024-02-07 NOTE — PROGRESS NOTES
"José Antonio Sandra \"Michael\" is a 89 y.o. male on day 6 of admission presenting with Acute on chronic diastolic heart failure (CMS/HCC).      Subjective   Patient was seen and examined today in the morning.  No acute events overnight.  Patient tolerated the BiPAP at night.  He stated that his shortness of breath is improving and he has no more chest pain.       Objective     Last Recorded Vitals  /58 (BP Location: Right arm, Patient Position: Lying)   Pulse 68   Temp 36.5 °C (97.7 °F) (Temporal)   Resp 24   Wt 70.6 kg (155 lb 10.3 oz)   SpO2 98%   Intake/Output last 3 Shifts:    Intake/Output Summary (Last 24 hours) at 2/7/2024 1045  Last data filed at 2/7/2024 0600  Gross per 24 hour   Intake 840 ml   Output 1150 ml   Net -310 ml       Admission Weight  Weight: 69.4 kg (153 lb) (02/01/24 1253)    Daily Weight  02/07/24 : 70.6 kg (155 lb 10.3 oz)      Physical Exam:  General: ANO x 3, on 4 L nasal cannula, frail  HEENT: PERRLA, head intact and normocephalic  Neck: JVD  Lungs: Decreased air entry at the bases with respiratory crackles at the bases with no wheezes or rhonchi  Cardiac: Irregular rate with ejection systolic murmur and holosystolic murmur.  Abdomen: Soft nontender, positive bowel sounds  : Exam deferred  Skin: Intact  Musculoskeletal: No peripheral edema  Neurological: Alert awake oriented, no focal deficit, cranial nerves grossly intact  Psych: No suicidal ideation or homicidal ideation    Relevant Results  Scheduled medications  [Held by provider] amLODIPine, 10 mg, oral, Daily  aspirin, 81 mg, oral, Nightly  budesonide, 0.5 mg, nebulization, BID  cholecalciferol, 5,000 Units, oral, Daily  famotidine, 10 mg, oral, Nightly  ferrous sulfate (325 mg ferrous sulfate), 65 mg of iron, oral, Daily  formoterol, 20 mcg, nebulization, BID  heparin (porcine), 5,000 Units, subcutaneous, q8h  ipratropium-albuteroL, 3 mL, nebulization, 4x daily  [Held by provider] losartan, 50 mg, oral, Daily  metoprolol " succinate XL, 25 mg, oral, Daily  polyethylene glycol, 17 g, oral, Daily  potassium chloride CR, 10 mEq, oral, Nightly  simvastatin, 40 mg, oral, Nightly  tamsulosin, 0.4 mg, oral, Daily      Continuous medications     PRN medications  PRN medications: acetaminophen, albuterol, diclofenac sodium, oxygen, oxygen     Results for orders placed or performed during the hospital encounter of 02/01/24 (from the past 24 hour(s))   CBC   Result Value Ref Range    WBC 9.3 4.4 - 11.3 x10*3/uL    nRBC 0.0 0.0 - 0.0 /100 WBCs    RBC 3.13 (L) 4.50 - 5.90 x10*6/uL    Hemoglobin 8.7 (L) 13.5 - 17.5 g/dL    Hematocrit 28.9 (L) 41.0 - 52.0 %    MCV 92 80 - 100 fL    MCH 27.8 26.0 - 34.0 pg    MCHC 30.1 (L) 32.0 - 36.0 g/dL    RDW 13.2 11.5 - 14.5 %    Platelets 129 (L) 150 - 450 x10*3/uL   Basic Metabolic Panel   Result Value Ref Range    Glucose 110 (H) 74 - 99 mg/dL    Sodium 137 136 - 145 mmol/L    Potassium 4.9 3.5 - 5.3 mmol/L    Chloride 99 98 - 107 mmol/L    Bicarbonate 33 (H) 21 - 32 mmol/L    Anion Gap 10 10 - 20 mmol/L    Urea Nitrogen 59 (H) 6 - 23 mg/dL    Creatinine 1.83 (H) 0.50 - 1.30 mg/dL    eGFR 34 (L) >60 mL/min/1.73m*2    Calcium 8.2 (L) 8.6 - 10.3 mg/dL   Magnesium   Result Value Ref Range    Magnesium 2.18 1.60 - 2.40 mg/dL   Phosphorus   Result Value Ref Range    Phosphorus 3.7 2.5 - 4.9 mg/dL   B-type natriuretic peptide   Result Value Ref Range     (H) 0 - 99 pg/mL             ECG 12 Lead    Result Date: 2/5/2024  Sinus rhythm with Premature atrial complexes Voltage criteria for left ventricular hypertrophy Nonspecific T wave abnormality Abnormal ECG When compared with ECG of 01-FEB-2024 14:23, T wave amplitude has decreased in Lateral leads    XR chest 1 view    Result Date: 2/4/2024  Interpreted By:  Jamison Lagunas, STUDY: XR CHEST 1 VIEW;  2/3/2024 11:32 am   INDICATION: Signs/Symptoms:hypoxia.   COMPARISON: CT chest and chest radiograph 02/01/2024   ACCESSION NUMBER(S): [id number]   ORDERING  CLINICIAN: EVELYNE CARRILLO   FINDINGS: AP radiograph of the chest was provided.   DEVICES: None.   CARDIOMEDIASTINAL SILHOUETTE: Cardiomediastinal silhouette is stable in size and configuration.Atherosclerotic calcifications of the aortic arch.   LUNGS: Bilateral low lung volumes. Interval worsening interstitial thickening and airspace opacities predominantly in the left mid to lower lung zones. Moderate left pleural effusion, also new since prior. No pneumothorax.   BONES: No acute osseous changes.       1.  Interval worsening since 02/01/2024.     Signed by: Jamison Lagunas 2/4/2024 12:05 PM Dictation workstation:   [id number]    Electrocardiogram, 12-lead PRN ACS symptoms    Result Date: 2/3/2024  Sinus rhythm with occasional nonconducted premature atrial complexes Moderate voltage criteria for LVH, may be normal variant Late transition Borderline ECG When compared with ECG of 24-DEC-2023 15:58, (unconfirmed) Sinus rhythm has replaced Atrial fibrillation Non-specific change in ST segment in Lateral leads Confirmed by Michael Sharma ([id number]) on 2/3/2024 10:51:26 AM    ECG 12 lead    Result Date: 2/2/2024  Sinus rhythm with Premature atrial complexes Moderate voltage criteria for LVH, may be normal variant Borderline ECG When compared with ECG of 01-FEB-2024 12:49, (unconfirmed) Premature atrial complexes are now Present Confirmed by Avtar Cm ([id number]) on 2/2/2024 1:26:52 PM    ECG 12 lead    Result Date: 2/2/2024  Sinus rhythm with marked sinus arrhythmia Moderate voltage criteria for LVH, may be normal variant Borderline ECG When compared with ECG of 24-DEC-2023 15:58, (unconfirmed) Sinus rhythm has replaced Atrial fibrillation Non-specific change in ST segment in Lateral leads Confirmed by Avtar Cm ([id number]) on 2/2/2024 1:25:38 PM    Transthoracic Echo (TTE) Limited    Result Date: 2/2/2024            [facility] [address] [facility], [facility] [address]    Tel [phone] Fax [phone]  TRANSTHORACIC ECHOCARDIOGRAM REPORT  Patient Name:      MICHELLE TORRES    Reading Physician:   [id number] Armando Ricks MD Study Date:        2/2/2024            Ordering Provider:   [id number] TIFFANY CHOETI MRN/PID:           [id number]            Fellow: Accession#:        [id number]        Nurse: Date of Birth/Age: 1/10/1931 / 89      Sonographer:         Ana Laura Dalton RDCS                    years Gender:            M                   Additional Staff: Height:            167.64 cm           Admit Date:          2/1/2024 Weight:            72.12 kg            Admission Status:    Inpatient - Routine BSA:               1.81 m2             Department Location: [facility] Echo Lab Blood Pressure: 131 /69 mmHg Study Type:    TRANSTHORACIC ECHO (TTE) LIMITED Diagnosis/ICD: Chest pain, unspecified-R07.9; Acute on chronic diastolic                (congestive) heart failure (CHF)-I50.33 Indication:    CP, CHF CPT Codes:     Echo Limited-85783  Study Detail: The following Echo studies were performed: 2D, M-Mode, Doppler and               color flow.  PHYSICIAN INTERPRETATION: Left Ventricle: Left ventricular systolic function is moderately decreased, with an estimated ejection fraction of 35-40%. There is global hypokinesis of the left ventricle with minor regional variations. The left ventricular cavity size is normal. Spectral Doppler shows a pseudonormal pattern of left ventricular diastolic filling. Left Atrium: The left atrium was not assessed. Right Ventricle: The right ventricle is normal in size. There is normal right ventricular global systolic function. Right Atrium: The right atrium was not assessed. Aortic Valve: The aortic valve is probably trileaflet. There is moderate aortic valve cusp calcification. There is moderate aortic valve thickening. There is evidence of severe aortic valve stenosis. There is trivial aortic valve regurgitation. Probably severe AS.  Recommend additional measurements if clinically indicated. Mitral Valve: The mitral valve is normal in structure. There is moderate mitral valve regurgitation. Tricuspid Valve: The tricuspid valve is structurally normal. There is moderate tricuspid regurgitation. The Doppler estimated RVSP is severely elevated at 66.0 mmHg. Pulmonic Valve: The pulmonic valve is structurally normal. There is no indication of pulmonic valve regurgitation. Pericardium: There is no pericardial effusion noted. Aorta: The aortic root was not assessed. Systemic Veins: The inferior vena cava appears moderately dilated. In comparison to the previous echocardiogram(s): Prior examinations are available and were reviewed for comparison purposes. Compared to 10/2023 study the LVEF is worse.  CONCLUSIONS:  1. Left ventricular systolic function is moderately decreased with a 35-40% estimated ejection fraction.  2. Spectral Doppler shows a pseudonormal pattern of left ventricular diastolic filling.  3. Moderate mitral valve regurgitation.  4. Moderate tricuspid regurgitation.  5. Severely elevated right ventricular systolic pressure.  6. Probably severe AS. Recommend additional measurements if clinically indicated.  7. Severe aortic valve stenosis.  8. There is moderate aortic valve cusp calcification.  9. The inferior vena cava appears moderately dilated. 10. Compared to 10/2023 study the LVEF is worse. 11. There is global hypokinesis of the left ventricle with minor regional variations. QUANTITATIVE DATA SUMMARY: 2D MEASUREMENTS:                           Normal Ranges: IVSd:          1.10 cm    (0.6-1.1cm) LVPWd:         1.00 cm    (0.6-1.1cm) LVIDd:         5.10 cm    (3.9-5.9cm) LVIDs:         4.00 cm LV Mass Index: 110.7 g/m2 LV % FS        21.6 % LV SYSTOLIC FUNCTION BY 2D PLANIMETRY (MOD):                     Normal Ranges: EF-A4C View: 38.0 % (>=55%) EF-A2C View: 38.9 % EF-Biplane:  38.9 % LV DIASTOLIC FUNCTION:                         Normal Ranges: MV Peak E:    1.46 m/s (0.7-1.2 m/s) MV Peak A:    1.12 m/s (0.42-0.7 m/s) E/A Ratio:    1.30     (1.0-2.2) MV e'         0.05 m/s (>8.0) MV lateral e' 0.06 m/s MV medial e'  0.05 m/s E/e' Ratio:   27.37    (<8.0) MITRAL VALVE:                 Normal Ranges: MV DT: 174 msec (150-240msec) AORTIC VALVE:                         Normal Ranges: LVOT Max Wilner:  0.86 m/s (<=1.1m/s) LVOT VTI:      18.70 cm LVOT Diameter: 2.10 cm  (1.8-2.4cm)  RIGHT VENTRICLE: TAPSE: 20.3 mm RV s'  0.08 m/s TRICUSPID VALVE/RVSP:                             Normal Ranges: Peak TR Velocity: 3.97 m/s RV Syst Pressure: 66.0 mmHg (< 30mmHg)  [id number] Armando Ricks MD Electronically signed on 2/2/2024 at 10:28:08 AM  ** Final **     CT chest abdomen pelvis wo IV contrast    Result Date: 2/1/2024  Interpreted By:  Noa Kwon, STUDY: CT CHEST ABDOMEN PELVIS WO CONTRAST;  2/1/2024 5:02 pm   INDICATION: Signs/Symptoms:chest pain, c/f free air on CXR.   COMPARISON: Chest x-ray performed on this day.   ACCESSION NUMBER(S): [id number]   ORDERING CLINICIAN: MICKEY HONEYCUTT   TECHNIQUE: CT of the chest, abdomen and pelvis was performed. Contiguous axial images were obtained at 3 mm slice thickness through the chest, abdomen and pelvis. Coronal and sagittal reconstructions at 3 mm slice thickness were performed.  No intravenous contrast was administered; positive oral contrast was given.   FINDINGS: Please note that the study is limited without intravenous contrast. The scan is also limited by artifacts arising from the patient's arms that are kept by his sides.   CHEST:   LUNG/PLEURA/LARGE AIRWAYS: Tracheobronchial tree this patent.  There are infiltrate/atelectasis in the posterior lower lobes. No pleural effusion. No suspicious pulmonary nodules..   VESSELS: Mild dilatation of the ascending aorta measured at 4.3 cm. Pulmonary arteries normal in caliber. Dense three-vessel coronary arterial calcifications. The scan is not optimized for coronary  calcium scoring.   HEART: Heart is moderately enlarged. No pericardial effusion.   MEDIASTINUM AND GLORIA: No mediastinal lymphadenopathy.  No large hilar lymph nodes on this nonenhanced scan. Esophagus is unremarkable   CHEST WALL AND LOWER NECK: Severe thoracic kyphosis.. Thyroid is not clearly visualized.   ABDOMEN:   LIVER: Unremarkable.   BILE DUCTS: Unremarkable.   GALLBLADDER: Tiny gallstones but with no signs of acute cholecystitis..   PANCREAS: Atrophic pancreas.   SPLEEN: Unremarkable.   ADRENAL GLANDS: Unremarkable.   KIDNEYS AND URETERS: Normal in size. Multiple bilateral cysts measured up to 7 cm. No hydronephrosis. No urinary tract calculi or obstruction.   PELVIS:   BLADDER: Unremarkable.   REPRODUCTIVE ORGANS: Brachytherapy seeds in the prostate. No pelvic masses.   BOWEL: Colon interposition anteriorly to liver which is responsible for the questionable free air on the recent chest radiograph. Stomach and intestine are otherwise unremarkable. No intestinal wall thickening. Appendix is identified..   VESSELS: Arteriosclerotic changes. Abdominal aorta and IVC are normal in caliber.   PERITONEUM/RETROPERITONEUM/LYMPH NODES: No evidence of lymphadenopathy. Retroperitoneal is unremarkable. No free fluid or free air.   ABDOMINAL WALL: A small right inguinal hernia which contains fat and fluid.   BONES: Unremarkable. Grade 1 anterolisthesis of C4-C5. No suspicious bone lesions.       Chest 1. Infiltrate/atelectasis in the posterior lower lobes. Rule out pneumonia. No pleural effusion. 2. Mild dilatation of the ascending aorta measured at 4.3 cm in diameter. 3. Exaggerated thoracic kyphosis. 4. Cardiomegaly.   Abdomen-Pelvis 1.  No evidence of free air. Colon interposition anteriorly to the liver which corresponds to the findings noted on the recent chest radiograph. 2. Multiple renal cysts. No hydronephrosis. 3. Tiny gallstones but with no signs of acute cholecystitis 4. A small right inguinal hernia  "containing fat and fluid. 5. Brachytherapy seeds in the prostate.     Signed by: Noa Kwon 2/1/2024 5:59 PM Dictation workstation:   [id number]    XR chest 1 view    Result Date: 2/1/2024  Interpreted By:  Eze Ashley, STUDY: XR CHEST 1 VIEW;  2/1/2024 2:03 pm   INDICATION: Signs/Symptoms:Chest Pain.   COMPARISON: 12/24/2023   ACCESSION NUMBER(S): [id number]   ORDERING CLINICIAN: STEFANIA MACKEY   FINDINGS: Low lung volumes with vascular crowding. Borderline heart size. Atherosclerosis. Diffuse interstitial pulmonary opacities suggestive of pulmonary edema. Questionable small left pleural effusion. Gas seen deep to the right hemidiaphragm, free air versus bowel gas.       Gas deep to the right hemidiaphragm, free air versus bowel gas. Consider follow-up with decubitus radiographs and/or CT of the abdomen and pelvis as warranted.   Cardiomegaly and pulmonary edema.   Eze Ashley discussed the significance and urgency of this critical finding by telephone with  STEFANIA MACKEY on 2/1/2024 at 2:28 pm.  (**-RCF-**) Findings:  See findings.     Signed by: Eze Ashley 2/1/2024 2:29 PM Dictation workstation:   [id number]       José Antonio Sandra \"Michael\" is a 89 y.o. male on day 6 of admission presenting with Acute on chronic diastolic heart failure (CMS/HCC).  Assessment/Plan   This patient currently has cardiac telemetry ordered; if you would like to modify or discontinue the telemetry order, click here to go to the orders activity to modify/discontinue the order.  Principal Problem:    Acute on chronic diastolic heart failure (CMS/HCC)  Active Problems:    Aortic stenosis    Dyspnea on exertion    Gastroesophageal reflux disease    Hard of hearing    Malignant neoplasm of prostate (CMS/HCC)    Chronic respiratory failure with hypoxia (CMS/HCC)    Essential hypertension    Acute on chronic congestive heart failure, unspecified heart failure type (CMS/HCC)  Acute on chronic systolic/diastolic heart " failure   Severe aortic stenosis  New HFrEF with EF 35 to 40%  Type II MI  Troponinemia secondary to demand ischemia  Pulmonary hypertension  Left sided pleural effusion   Plan  -The patient is currently on a diuresis holiday, and kidney function is showing improvement from 1.95 to 1.83. I would recommend continuing the holiday for an additional day.    -The new systolic heart failure appears to be multifactorial, likely from severe aortic stenosis coupled with new CAD. Due to the patient's CKD, we will refrain from performing a left heart catheterization and structural heart evaluation for surgical valve replacement or TAVR, as the patient is not a suitable surgical candidate. Following further assessment from the structural heart evaluation, outpatient left heart catheterization may be recommended.    -In terms of GDMT, we will continue to withhold losartan pending improvement in kidney function. Amlodipine will also be withheld to aid in kidney function improvement. Consideration will be given to switching from metoprolol succinate to carvedilol at a later stage.    -Currently, the patient's volume status is approaching baseline, with the patient requiring 4 L nasal cannula compared to their baseline of 2 L nasal cannula. Typically, a right heart catheterization would be performed to better assess volume status by evaluating capillary wedge pressure, but we aim to achieve this goal through noninvasive assessment methods.      Assessment and plan discussed with my attending.   Aneesh Kyle MD   Internal Medicine, PGY-1 .

## 2024-02-07 NOTE — PROGRESS NOTES
"Physical Therapy    Physical Therapy    Physical Therapy Treatment    Patient Name: José Antonio Sandra \"Michael\"  MRN: [id number]  Today's Date: 2/7/2024  Time Calculation  Start Time: 1106  Stop Time: 1131  Time Calculation (min): 25 min      02/07/24 1106   PT  Visit   PT Received On 02/07/24   Response to Previous Treatment Patient with no complaints from previous session.   General   Prior to Session Communication Bedside nurse   Patient Position Received Bed, 3 rail up;Alarm on   General Comment Encouragement needed to sit in chair, cleared for participation.   Precautions   Medical Precautions Fall precautions   Precautions Comment Pt on 6L oxymizer; very [place], requires increased volume   Pain Assessment   Pain Assessment 0-10   Pain Score 0 - No pain   Effect of Pain on Daily Activities .   Cognition   Overall Cognitive Status WFL   Therapeutic Exercise   Therapeutic Exercise Performed Yes   Therapeutic Exercise Activity 1 AP/LAQ/ABD witrh manual resistance/seated march x10   Balance/Neuromuscular Re-Education   Balance/Neuromuscular Re-Education Activity Performed Yes   Balance/Neuromuscular Re-Education Activity 1 standing balance trials x3 to increase strength and endurance for functional activities. Standing march x5. pt is retropulsive requiring mod/maxA and max v/t cues to maintain midline.   Bed Mobility   Bed Mobility Yes   Bed Mobility 1   Bed Mobility 1 Supine to sitting   Level of Assistance 1 Moderate assistance;Moderate verbal cues;Moderate tactile cues   Bed Mobility Comments 1 HOB elevated, increased time and effort to complete   Transfers   Transfer Yes   Transfer 1   Transfer From 1 Bed to   Transfer to 1 Chair with arms   Technique 1 Sit to stand;Stand to sit   Transfer Device 1 Walker;Gait belt   Transfer Level of Assistance 1 Moderate assistance;Maximum verbal cues;Maximum tactile cues   Trials/Comments 1 Pt is retropulsive, max v/t forhand placement and anterior weight shift for safe transfer " to chair. small/shuffling turning steps to chair.   Activity Tolerance   Endurance Tolerates 10 - 20 min exercise with multiple rests   PT Assessment   PT Assessment Results Decreased strength;Decreased endurance;Impaired balance;Decreased mobility   Rehab Prognosis Good   End of Session Communication Bedside nurse   Assessment Comment Pt is pleasant and cooperative. mod/maxAx1 for all activities. Pt continues to be retropulsive requiring max v/t cues to maintain midline. Unsafe to ambulate at this time d/t safety concerns.   End of Session Patient Position Up in chair;Alarm on       Outcome Measures:  [facility] Basic Mobility  Turning from your back to your side while in a flat bed without using bedrails: A lot  Moving from lying on your back to sitting on the side of a flat bed without using bedrails: A lot  Moving to and from bed to chair (including a wheelchair): A lot  Standing up from a chair using your arms (e.g. wheelchair or bedside chair): A lot  To walk in hospital room: A lot  Climbing 3-5 steps with railing: A lot  Basic Mobility - Total Score: 12                             EDUCATION:  Outpatient Education  Individual(s) Educated: Patient  Education Provided: Fall Risk  Education Documentation  Mobility Training, taught by Gloria Dukes PTA at 2/7/2024 11:45 AM.  Learner: Family, Patient  Readiness: Acceptance  Method: Explanation, Demonstration  Response: Verbalizes Understanding, Demonstrated Understanding, Needs Reinforcement    Body Mechanics, taught by Gloria Dukes PTA at 2/7/2024 11:45 AM.  Learner: Family, Patient  Readiness: Acceptance  Method: Explanation, Demonstration  Response: Verbalizes Understanding, Demonstrated Understanding, Needs Reinforcement    ADL Training, taught by Gloria Dukes PTA at 2/7/2024 11:45 AM.  Learner: Family, Patient  Readiness: Acceptance  Method: Explanation, Demonstration  Response: Verbalizes Understanding, Demonstrated Understanding, Needs  Reinforcement    Mobility Training, taught by Gloria Dukes PTA at 2/6/2024  2:57 PM.  Learner: Family, Patient  Readiness: Acceptance  Method: Explanation  Response: Verbalizes Understanding, Demonstrated Understanding, Needs Reinforcement    Education Comments  No comments found.        GOALS:  Encounter Problems       Encounter Problems (Active)       PT Problem       Bed mobility (Progressing)       Start:  02/02/24    Expected End:  02/10/24       Pt will perform supine<>sit with HOB flat, MARTIN.           Transfers (Progressing)       Start:  02/02/24    Expected End:  02/10/24       Pt will perform all transfers with LRAD, MARTIN.            Gait (Progressing)       Start:  02/02/24    Expected End:  02/10/24       Pt will amb 150' with LRAD, MARTIN with reciprocal gait, upright posture and improved activity tolerance as demonstrated by vitals.           Balance (Progressing)       Start:  02/02/24    Expected End:  02/10/24       Pt will tolerate standing x2 min without BUE support, SBAx1.           Strength (Progressing)       Start:  02/02/24    Expected End:  02/10/24       Pt will improve gross BLE strength to 4-/5.            Pain - Adult          Transfers       STG - Patient will perform bed mobility MOD I       Start:  02/02/24    Expected End:  02/10/24            STG - Patient will perform toilet transfer MOD I       Start:  02/02/24    Expected End:  02/10/24

## 2024-02-07 NOTE — CARE PLAN
Patient remained hds throughout shift. Tolerating BIPAP at night well. Converted to NSR, EKG in chart. No c/o pain or SOB. Safety maintained.

## 2024-02-07 NOTE — PROGRESS NOTES
Nutrition Follow Up Assessment:   Nutrition Assessment         Patient is a 89 y.o. male presenting from home w/wife for acute on chronic diastolic heart failure. Pt family at bedside. Cardiology on consult.     2/7/24: follow up note. Palliative Care and Cardiology on consult. Pt on 4L O2 oxymizer and BiPAP overnight. Pt last BM 2/1 - RN aware and addressing.    Meal intakes: 2/6 100%B (371kcal, 14g pro), no lunch, 75%D (486kcal, 15g pro - for full meal); 2/5 refused dinner and family report pt slept during breakfast and lunch; 2/4 100% B and L. Family report pt has been ordering the same breakfast and dinner (grilled cheese) daily.     Upon 2/7 RD visit family was present (2 sons and wife) and they noted pt had to avoid OJ. Upon discussion w/RN, she reports pt drank 8 containers of OJ yesterday and RN suggested more water and less OJ intakes.       Past Medical History  HFpEF (EF: 50 to 55% October 2023), COPD (baseline- 2 L NC), dyslipidemia, CKD (baseline CR: 1.8), COPD, anemia, pulmonary hypertension, prostate cancer and moderate to severe aortic stenosis     has a past medical history of Pain in toes of both feet (02/02/2024), Pain of toe (02/02/2024), and Personal history of malignant neoplasm of prostate (03/22/2021).  Surgical History   has a past surgical history that includes Other surgical history (03/22/2021).    Nutrition History:  Energy Intake: Good > 75 %  Food and Nutrient History: Pt w/low sodium diet at home PTA. Pt eats a late breakfast (9:30-10am) of eggs or corn flakes w/fruit or instant hot cereal. Pt has a late dinner (7-8pm) often a frozen meal since pt can no longer prepare meals. Pt wife is also unable to cook. Pt has family locally and they will bring takeout food or pt will order pizza, etc a couple times a month. Pt is sleeping more and more per family. Pt does not add salt to foods.  Vitamin/Herbal Supplement Use: no oral nutrition supplements.  Food Allergies/Intolerances:  None  GI  "Symptoms: Constipation - last BM 2/1 - RN aware and addressing  Oral Problems: None       Anthropometrics:  Height: 167.6 cm (5' 6\")   Weight: 70.6 kg (155 lb 10.3 oz) ; 2/7/24  70.6kg.  BMI (Calculated): 25.13             Weight History:     Weight Change %:  Weight History / % Weight Change: per archives 11/17/22 168#, 5/31/23 162#. Per pt 153# last week. 8.9% wt loss x <3 months using pt wt.  Significant Weight Loss: Yes  Interpretation of Weight Loss: >7.5% in 3 months    Nutrition Focused Physical Exam Findings:    Subcutaneous Fat Loss:   Orbital Fat Pads: Mild-Moderate (slight dark circles and slight hollowing)  Buccal Fat Pads: Well nourished (full, rounded cheeks)  Triceps: Well nourished (ample fat tissue)  Muscle Wasting:  Temporalis: Mild-Moderate (slight depression)  Pectoralis (Clavicular Region): Well nourished (clavicle not visible)  Quadriceps: Well nourished (well developed, well rounded)  Gastrocnemius: Well nourished (well developed bulbous muscle)  Edema:  Edema: +1 trace  Edema Location: BLLE  Physical Findings:  Skin: Negative (pale, dry, bruising, redness sacrum.)    Nutrition Significant Labs:  CBC Trend:   Results from last 7 days   Lab Units 02/07/24  0417 02/06/24  0633 02/05/24  0620 02/04/24  0819   WBC AUTO x10*3/uL 9.3 6.7 9.1 8.6   RBC AUTO x10*6/uL 3.13* 3.54* 3.30* 3.33*   HEMOGLOBIN g/dL 8.7* 9.8* 9.2* 9.4*   HEMATOCRIT % 28.9* 33.3* 30.4* 30.8*   MCV fL 92 94 92 93   PLATELETS AUTO x10*3/uL 129* 141* 133* 133*    , BMP Trend:   Results from last 7 days   Lab Units 02/07/24  0417 02/06/24  0936 02/05/24  1418 02/05/24  0620   GLUCOSE mg/dL 110* 124* 129* 107*  116*   CALCIUM mg/dL 8.2* 8.6 8.6 8.5*  8.6   SODIUM mmol/L 137 143 140 140  140   POTASSIUM mmol/L 4.9 4.5 4.0 4.0  3.9   CO2 mmol/L 33* 34* 35* 30  31   CHLORIDE mmol/L 99 101 100 100  100   BUN mg/dL 59* 54* 46* 46*  43*   CREATININE mg/dL 1.83* 1.95* 1.88* 1.93*  1.85*    , A1C:No results found for: \"HGBA1C\", BG " "POCT trend:    , Liver Function Trend:   Results from last 7 days   Lab Units 02/04/24 0819 02/03/24  0724 02/02/24  0815 02/01/24  1310   ALK PHOS U/L 36 38 36 44   AST U/L 8* 10 10 12   ALT U/L 8* 9* 9* 10   BILIRUBIN TOTAL mg/dL 0.5 0.5 0.5 0.7    , Renal Lab Trend:   Results from last 7 days   Lab Units 02/07/24 0417 02/06/24 0936 02/06/24  0633 02/05/24  1418 02/05/24  0620   POTASSIUM mmol/L 4.9 4.5  --  4.0 4.0  3.9   PHOSPHORUS mg/dL 3.7  --   --   --  4.3   SODIUM mmol/L 137 143  --  140 140  140   MAGNESIUM mg/dL 2.18  --    < > 1.98 1.89   EGFR mL/min/1.73m*2 34* 31*  --  33* 32*  34*   BUN mg/dL 59* 54*  --  46* 46*  43*   CREATININE mg/dL 1.83* 1.95*  --  1.88* 1.93*  1.85*    < > = values in this interval not displayed.    , TPN/PPN Labs:   Results from last 7 days   Lab Units 02/07/24 0417 02/06/24 0936 02/06/24 0633 02/05/24  1418 02/05/24  0620 02/04/24  0819 02/04/24  0819   GLUCOSE mg/dL 110* 124*  --  129* 107*  116*  --  93   POTASSIUM mmol/L 4.9 4.5  --  4.0 4.0  3.9  --  3.7   PHOSPHORUS mg/dL 3.7  --   --   --  4.3   < >  --    MAGNESIUM mg/dL 2.18  --    < > 1.98 1.89  --  1.90   SODIUM mmol/L 137 143  --  140 140  140  --  141   CHLORIDE mmol/L 99 101  --  100 100  100  --  102   ALT U/L  --   --   --   --   --   --  8*   AST U/L  --   --   --   --   --   --  8*   ALK PHOS U/L  --   --   --   --   --   --  36   BILIRUBIN TOTAL mg/dL  --   --   --   --   --   --  0.5    < > = values in this interval not displayed.    , Lipid Panel:   Lab Results   Component Value Date    CHOL 116 03/29/2022    HDL 51.0 03/29/2022    CHHDL 2.3 03/29/2022    LDLF 50 03/29/2022    VLDL 15 03/29/2022    TRIG 77 03/29/2022    , Vit D:   Lab Results   Component Value Date    VITD25 63 10/30/2023    , Vit B12:   Lab Results   Component Value Date    [id number] 980 (H) 12/22/2022    , Iron Panel:   Lab Results   Component Value Date    FERRITIN 63 03/29/2022    , Folate: No results found for: \"FOLATE\" "     Nutrition Specific Medications:  Scheduled medications  [Held by provider] amLODIPine, 10 mg, oral, Daily  aspirin, 81 mg, oral, Nightly  budesonide, 0.5 mg, nebulization, BID  cholecalciferol, 5,000 Units, oral, Daily  famotidine, 10 mg, oral, Nightly  ferrous sulfate (325 mg ferrous sulfate), 65 mg of iron, oral, Daily  formoterol, 20 mcg, nebulization, BID  heparin (porcine), 5,000 Units, subcutaneous, q8h  ipratropium-albuteroL, 3 mL, nebulization, 4x daily  [Held by provider] losartan, 50 mg, oral, Daily  metoprolol succinate XL, 25 mg, oral, Daily  polyethylene glycol, 17 g, oral, Daily  potassium chloride CR, 10 mEq, oral, Nightly  simvastatin, 40 mg, oral, Nightly  tamsulosin, 0.4 mg, oral, Daily  [START ON 2/8/2024] torsemide, 10 mg, oral, Daily      Continuous medications     PRN medications  PRN medications: acetaminophen, albuterol, diclofenac sodium, oxygen, oxygen    Pain Score: 0 - No pain     I/O:   Last BM Date: 02/01/24;          Dietary Orders (From admission, onward)       Start     Ordered    02/07/24 1536  Oral nutritional supplements  Until discontinued        Comments: Chocolate   Question Answer Comment   Deliver with Dinner    Select supplement: Magic Cup        02/07/24 1535    02/07/24 1536  Oral nutritional supplements  Until discontinued        Comments: chocolate   Question Answer Comment   Deliver with Breakfast    Select supplement: Mighty Shake        02/07/24 1535    02/05/24 1247  Adult diet Cardiac; 70 gm fat; 2 - 3 grams Sodium  Diet effective now        Question Answer Comment   Diet type Cardiac    Fat restriction: 70 gm fat    Sodium restriction: 2 - 3 grams Sodium        02/05/24 1246    02/01/24 2054  May Participate in Room Service  Once        Question:  .  Answer:  Yes    02/01/24 2053                     Estimated Needs:   Total Energy Estimated Needs (kCal):  (1460-4413 (23-26kcal/kg))     Total Protein Estimated Needs (g):  (72-86 (1.0-1.2g/kg))     Total Fluid  "Estimated Needs (mL):  (1mL/kcal or per Cardiology)           Nutrition Diagnosis   Malnutrition Diagnosis  Patient has Malnutrition Diagnosis: No    Nutrition Diagnosis  Patient has Nutrition Diagnosis: Yes  Diagnosis Status (1): Ongoing  Nutrition Diagnosis 1: Unintended weight loss  Related to (1): suspected fluid fluctuation in setting of CHF  As Evidenced by (1): 8.9% wt loss per pt x <3 months.       Nutrition Interventions/Recommendations         Nutrition Prescription:  Individualized Nutrition Prescription Provided for : Diet: continue with Cardiac, 70g fat, 2-3gm sodium diet as ordered.        Nutrition Interventions:   Food and/or Nutrient Delivery Interventions  Interventions: Meals and snacks, Medical food supplement  Meals and Snacks: Fat-modified diet, Mineral-modified diet  Goal: will consume 75% of meals.  Medical Food Supplement: Commercial beverage  Goal: will provide Mighty Shake at breakfast (330kcal, 9g pro per 6oz serving) and Magic Cup (290kcal, 9g pro per 4oz serving).    Coordination of Nutrition Care by a Nutrition Professional  Collaboration and Referral of Nutrition Care: Collaboration by nutrition professional with other providers  Goal: NATY Davey    Nutrition Education:   Education Documentation  Nutrition Care Manual, taught by Sharla Anderson RD at 2/2/2024  3:02 PM.  Learner: Family, Patient  Readiness: Acceptance  Method: Explanation, Handout  Response: Verbalizes Understanding  Comment: Provided AND handout from [facility] \"Heart Failure Nutrition Therapy\" and reviewed w/pt and family. Discussed MOW (therapeutic w/MD Rx) for low sodium meals. Encouraged label reading and suggestions for lower sodium alternatives.      2/2: Pt completes daily wts and reinforced tracking on a calendar.   Provided pt w/a copy of the [facility] menu w/sodium content of foods listed.       Nutrition Monitoring and Evaluation   Food/Nutrient Related History Monitoring  Monitoring and Evaluation Plan: Energy " intake  Energy Intake: Estimated energy intake  Criteria: will consume 75% of meals and oral nutrition supplements.    Body Composition/Growth/Weight History  Monitoring and Evaluation Plan: Weight change  Weight: Measured weight  Weight Change: Weight loss  Criteria: achieve decrease in edema.    Biochemical Data, Medical Tests and Procedures  Monitoring and Evaluation Plan: Electrolyte/renal panel  Electrolyte and Renal Panel: Sodium, Potassium, Phosphorus, Magnesium  Criteria: WNL            Time Spent/Follow-up Reminder:   Time Spent (min): 45 minutes  Last Date of Nutrition Visit: 02/07/24  Nutrition Follow-Up Needed?: 3-8 days  Follow up Comment: JARON

## 2024-02-07 NOTE — PROGRESS NOTES
Michael Sandra is a 89 y.o. male on day 6 of admission presenting with Acute on chronic diastolic heart failure (CMS/HCC).      Subjective   Patient seen and examined  Patient wore bipap HS overnight, currently on 4 L ozymizer  Denies any complaints    Objective     Last Recorded Vitals  /58 (BP Location: Right arm, Patient Position: Lying)   Pulse 68   Temp 36.5 °C (97.7 °F) (Temporal)   Resp 24   Wt 70.6 kg (155 lb 10.3 oz)   SpO2 98%   Intake/Output last 3 Shifts:    Intake/Output Summary (Last 24 hours) at 2/7/2024 1109  Last data filed at 2/7/2024 0600  Gross per 24 hour   Intake 840 ml   Output 1150 ml   Net -310 ml         Admission Weight  Weight: 69.4 kg (153 lb) (02/01/24 1253)    Daily Weight  02/07/24 : 70.6 kg (155 lb 10.3 oz)    Physical Exam  Gen: NAD, alert oriented, awake, pleasant cooperative  HEENT: MMM, EOMI, no scleral icterus  Neck: supple, no LAD  CV: irregularly irregular  Lungs: CTA upper lung fields, diminished at bases, no conversational dyspnea  Abd: soft, nontender, nondistended, normoactive BS  Ext: WWP, no lower extremity pitting edema  Skin: no rashes/lesions  Neuro: Cns III-XII grossly intact, alert, oriented     Scheduled medications  [Held by provider] amLODIPine, 10 mg, oral, Daily  aspirin, 81 mg, oral, Nightly  budesonide, 0.5 mg, nebulization, BID  cholecalciferol, 5,000 Units, oral, Daily  famotidine, 10 mg, oral, Nightly  ferrous sulfate (325 mg ferrous sulfate), 65 mg of iron, oral, Daily  formoterol, 20 mcg, nebulization, BID  heparin (porcine), 5,000 Units, subcutaneous, q8h  ipratropium-albuteroL, 3 mL, nebulization, 4x daily  [Held by provider] losartan, 50 mg, oral, Daily  metoprolol succinate XL, 25 mg, oral, Daily  polyethylene glycol, 17 g, oral, Daily  potassium chloride CR, 10 mEq, oral, Nightly  simvastatin, 40 mg, oral, Nightly  tamsulosin, 0.4 mg, oral, Daily      Continuous medications       PRN medications  PRN medications: acetaminophen,  albuterol, diclofenac sodium, oxygen, oxygen    Results for orders placed or performed during the hospital encounter of 02/01/24 (from the past 24 hour(s))   CBC   Result Value Ref Range    WBC 9.3 4.4 - 11.3 x10*3/uL    nRBC 0.0 0.0 - 0.0 /100 WBCs    RBC 3.13 (L) 4.50 - 5.90 x10*6/uL    Hemoglobin 8.7 (L) 13.5 - 17.5 g/dL    Hematocrit 28.9 (L) 41.0 - 52.0 %    MCV 92 80 - 100 fL    MCH 27.8 26.0 - 34.0 pg    MCHC 30.1 (L) 32.0 - 36.0 g/dL    RDW 13.2 11.5 - 14.5 %    Platelets 129 (L) 150 - 450 x10*3/uL   Basic Metabolic Panel   Result Value Ref Range    Glucose 110 (H) 74 - 99 mg/dL    Sodium 137 136 - 145 mmol/L    Potassium 4.9 3.5 - 5.3 mmol/L    Chloride 99 98 - 107 mmol/L    Bicarbonate 33 (H) 21 - 32 mmol/L    Anion Gap 10 10 - 20 mmol/L    Urea Nitrogen 59 (H) 6 - 23 mg/dL    Creatinine 1.83 (H) 0.50 - 1.30 mg/dL    eGFR 34 (L) >60 mL/min/1.73m*2    Calcium 8.2 (L) 8.6 - 10.3 mg/dL   Magnesium   Result Value Ref Range    Magnesium 2.18 1.60 - 2.40 mg/dL   Phosphorus   Result Value Ref Range    Phosphorus 3.7 2.5 - 4.9 mg/dL   B-type natriuretic peptide   Result Value Ref Range     (H) 0 - 99 pg/mL       XR chest 1 view    Result Date: 2/4/2024  Interpreted By:  Jamison Lagunas, STUDY: XR CHEST 1 VIEW;  2/3/2024 11:32 am   INDICATION: Signs/Symptoms:hypoxia.   COMPARISON: CT chest and chest radiograph 02/01/2024   ACCESSION NUMBER(S): [id number]   ORDERING CLINICIAN: EVELYNE CARRILLO   FINDINGS: AP radiograph of the chest was provided.   DEVICES: None.   CARDIOMEDIASTINAL SILHOUETTE: Cardiomediastinal silhouette is stable in size and configuration.Atherosclerotic calcifications of the aortic arch.   LUNGS: Bilateral low lung volumes. Interval worsening interstitial thickening and airspace opacities predominantly in the left mid to lower lung zones. Moderate left pleural effusion, also new since prior. No pneumothorax.   BONES: No acute osseous changes.       1.  Interval worsening since 02/01/2024.      Signed by: Jamison Lagunas 2/4/2024 12:05 PM Dictation workstation:   [id number]         Assessment/Plan     Principal Problem:    Acute on chronic diastolic heart failure (CMS/HCC)  Active Problems:    Aortic stenosis    Dyspnea on exertion    Gastroesophageal reflux disease    Hard of hearing    Malignant neoplasm of prostate (CMS/HCC)    Chronic respiratory failure with hypoxia (CMS/HCC)    Essential hypertension    Acute on chronic congestive heart failure, unspecified heart failure type (CMS/HCC)    Mr Sandra is a 89yo man with COPD (bl 2L NC) pulmonary HTN, moderate to severe aortic stenosis, heart failure, ascending aortic aneurysm, DLD admitted for chest pain and acute decompensated HF. Found on TTE to have worsening ejection fraction with EF 35-40%, as well as probably severe aortic stenosis. Unfortunately, over the weekend, patient decompensated, developing acute hypoxic respiratory failure and worsening acute heart failure. He is currently on lasix gtt with slight improvement in respiratory status as of this AM. Cardiology following. Palliative care consulted given poor prognosis.     #acute systolic heart failure  #acute on chronic hypoxic respiratory failure  #severe aortic stenosis  #COPD    Plan:    -TTE with EF 35-40%, severe aortic stenosis, global hypokinesis LV  -will resume patient home 10 mg torsemide for tomorrow  -no significant change in renal function overnight  -Wore BiPAP HS and currently on 4 L oxymizer  -Cardiology following:  In setting of renal insufficiency and worsening respiratory status, patient is a poor candidate for cardiac catheterization considered high risk. Given his age and his comorbidities I believe that his life expectancy is less than 6 months.   -cont holding losartan   -cont metoprolol succinate 25mg  -cont ASA,cont statin, cont ICS, DIANE, LABA,-cont albuterol PRN  -hemoglobin 8.7, post 1 uPRBC on hospitalization  -Misc: cont rest of home meds as  ordered  -cardiac diet  -am labs including cbc, bmp, mag  -POC discussed with patient and attending at bedside  -PT OT, am pac 12, dc planning, likely need SNF  -Discussed code status with patient on Saturday - he elected to be full code    Code: full    [facility]  [address] [address], [place]  [address]  Phone: [phone] Fax: [phone]

## 2024-02-07 NOTE — PROGRESS NOTES
"Michael Sandra is a 89 y.o. male on day 6 of admission presenting with Acute on chronic diastolic heart failure (CMS/HCC).    Subjective      Pt up in chair , more alert, on 4L 02 Oxymizer on Bipap overnight. Met with family bedside, pt's two additional sons arriving into town today. Family still deciding between SNF and Hospice.  Family reviewing SNF list. Hospice Info given to family. Await results of family/pt code status discussion. Support provided. Will follow.     4p  Pt seen by HF Navigator, family understanding pt's overall poor prognosis. Long family meeting with pts sons who arrived from [place], [facility] , Spouse and others. Discussed Hospice vs SNF extensively. Family considering rehab with Palliative vs [facility]. Family to discuss amongst themselves ,and are agreeable to continue goals of care discussions again tomorrow. Will follow.      Last Recorded Vitals  Blood pressure 112/58, pulse 68, temperature 36.5 °C (97.7 °F), temperature source Temporal, resp. rate 24, height 1.676 m (5' 6\"), weight 70.6 kg (155 lb 10.3 oz), SpO2 98 %.  Intake/Output last 3 Shifts:  I/O last 3 completed shifts:  In: 1360 (19.3 mL/kg) [P.O.:1360]  Out: 1350 (19.1 mL/kg) [Urine:1350 (0.5 mL/kg/hr)]  Weight: 70.6 kg     Relevant Results           This patient currently has cardiac telemetry ordered; if you would like to modify or discontinue the telemetry order, click here to go to the orders activity to modify/discontinue the order.                 Assessment/Plan   Principal Problem:    Acute on chronic diastolic heart failure (CMS/HCC)  Active Problems:    Aortic stenosis    Dyspnea on exertion    Gastroesophageal reflux disease    Hard of hearing    Malignant neoplasm of prostate (CMS/HCC)    Chronic respiratory failure with hypoxia (CMS/HCC)    Essential hypertension    Acute on chronic congestive heart failure, unspecified heart failure type (CMS/HCC)               Kym Narvaez, LCSW      "

## 2024-02-08 ENCOUNTER — TELEPHONE (OUTPATIENT)
Dept: PRIMARY CARE | Facility: CLINIC | Age: 89
End: 2024-02-08
Payer: MEDICARE

## 2024-02-08 LAB
ANION GAP SERPL CALC-SCNC: 11 MMOL/L (ref 10–20)
ATRIAL RATE: 87 BPM
BUN SERPL-MCNC: 56 MG/DL (ref 6–23)
CALCIUM SERPL-MCNC: 8.5 MG/DL (ref 8.6–10.3)
CHLORIDE SERPL-SCNC: 100 MMOL/L (ref 98–107)
CO2 SERPL-SCNC: 32 MMOL/L (ref 21–32)
CREAT SERPL-MCNC: 1.7 MG/DL (ref 0.5–1.3)
EGFRCR SERPLBLD CKD-EPI 2021: 37 ML/MIN/1.73M*2
ERYTHROCYTE [DISTWIDTH] IN BLOOD BY AUTOMATED COUNT: 13.1 % (ref 11.5–14.5)
GLUCOSE SERPL-MCNC: 129 MG/DL (ref 74–99)
HCT VFR BLD AUTO: 27 % (ref 41–52)
HGB BLD-MCNC: 8.3 G/DL (ref 13.5–17.5)
MAGNESIUM SERPL-MCNC: 2.32 MG/DL (ref 1.6–2.4)
MCH RBC QN AUTO: 28.5 PG (ref 26–34)
MCHC RBC AUTO-ENTMCNC: 30.7 G/DL (ref 32–36)
MCV RBC AUTO: 93 FL (ref 80–100)
NRBC BLD-RTO: 0 /100 WBCS (ref 0–0)
P AXIS: 19 DEGREES
PLATELET # BLD AUTO: 120 X10*3/UL (ref 150–450)
POTASSIUM SERPL-SCNC: 5.1 MMOL/L (ref 3.5–5.3)
PR INTERVAL: 184 MS
Q ONSET: 222 MS
QRS COUNT: 13 BEATS
QRS DURATION: 86 MS
QT INTERVAL: 374 MS
QTC CALCULATION(BAZETT): 450 MS
QTC FREDERICIA: 423 MS
R AXIS: -22 DEGREES
RBC # BLD AUTO: 2.91 X10*6/UL (ref 4.5–5.9)
SODIUM SERPL-SCNC: 138 MMOL/L (ref 136–145)
T AXIS: 26 DEGREES
T OFFSET: 409 MS
VENTRICULAR RATE: 87 BPM
WBC # BLD AUTO: 9.1 X10*3/UL (ref 4.4–11.3)

## 2024-02-08 PROCEDURE — 94640 AIRWAY INHALATION TREATMENT: CPT

## 2024-02-08 PROCEDURE — 83735 ASSAY OF MAGNESIUM: CPT | Performed by: NURSE PRACTITIONER

## 2024-02-08 PROCEDURE — 2500000004 HC RX 250 GENERAL PHARMACY W/ HCPCS (ALT 636 FOR OP/ED)

## 2024-02-08 PROCEDURE — 85027 COMPLETE CBC AUTOMATED: CPT | Performed by: NURSE PRACTITIONER

## 2024-02-08 PROCEDURE — 2500000001 HC RX 250 WO HCPCS SELF ADMINISTERED DRUGS (ALT 637 FOR MEDICARE OP): Performed by: INTERNAL MEDICINE

## 2024-02-08 PROCEDURE — 2500000001 HC RX 250 WO HCPCS SELF ADMINISTERED DRUGS (ALT 637 FOR MEDICARE OP)

## 2024-02-08 PROCEDURE — 36415 COLL VENOUS BLD VENIPUNCTURE: CPT | Performed by: NURSE PRACTITIONER

## 2024-02-08 PROCEDURE — 2500000002 HC RX 250 W HCPCS SELF ADMINISTERED DRUGS (ALT 637 FOR MEDICARE OP, ALT 636 FOR OP/ED): Performed by: STUDENT IN AN ORGANIZED HEALTH CARE EDUCATION/TRAINING PROGRAM

## 2024-02-08 PROCEDURE — 97112 NEUROMUSCULAR REEDUCATION: CPT | Mod: GP,CQ

## 2024-02-08 PROCEDURE — 2500000004 HC RX 250 GENERAL PHARMACY W/ HCPCS (ALT 636 FOR OP/ED): Performed by: STUDENT IN AN ORGANIZED HEALTH CARE EDUCATION/TRAINING PROGRAM

## 2024-02-08 PROCEDURE — 97530 THERAPEUTIC ACTIVITIES: CPT | Mod: GP,CQ

## 2024-02-08 PROCEDURE — 97535 SELF CARE MNGMENT TRAINING: CPT | Mod: GO,CO

## 2024-02-08 PROCEDURE — 94660 CPAP INITIATION&MGMT: CPT

## 2024-02-08 PROCEDURE — 2500000001 HC RX 250 WO HCPCS SELF ADMINISTERED DRUGS (ALT 637 FOR MEDICARE OP): Performed by: NURSE PRACTITIONER

## 2024-02-08 PROCEDURE — 99232 SBSQ HOSP IP/OBS MODERATE 35: CPT

## 2024-02-08 PROCEDURE — 2500000001 HC RX 250 WO HCPCS SELF ADMINISTERED DRUGS (ALT 637 FOR MEDICARE OP): Performed by: STUDENT IN AN ORGANIZED HEALTH CARE EDUCATION/TRAINING PROGRAM

## 2024-02-08 PROCEDURE — 99232 SBSQ HOSP IP/OBS MODERATE 35: CPT | Performed by: STUDENT IN AN ORGANIZED HEALTH CARE EDUCATION/TRAINING PROGRAM

## 2024-02-08 PROCEDURE — 80048 BASIC METABOLIC PNL TOTAL CA: CPT | Performed by: NURSE PRACTITIONER

## 2024-02-08 PROCEDURE — 2060000001 HC INTERMEDIATE ICU ROOM DAILY

## 2024-02-08 RX ORDER — LACTULOSE 10 G/15ML
20 SOLUTION ORAL 2 TIMES DAILY
Status: DISCONTINUED | OUTPATIENT
Start: 2024-02-08 | End: 2024-02-10 | Stop reason: HOSPADM

## 2024-02-08 RX ORDER — DAPAGLIFLOZIN 10 MG/1
10 TABLET, FILM COATED ORAL DAILY
Status: DISCONTINUED | OUTPATIENT
Start: 2024-02-08 | End: 2024-02-10 | Stop reason: HOSPADM

## 2024-02-08 RX ADMIN — METOPROLOL SUCCINATE 25 MG: 25 TABLET, EXTENDED RELEASE ORAL at 08:21

## 2024-02-08 RX ADMIN — IPRATROPIUM BROMIDE AND ALBUTEROL SULFATE 3 ML: 2.5; .5 SOLUTION RESPIRATORY (INHALATION) at 09:21

## 2024-02-08 RX ADMIN — HEPARIN SODIUM 5000 UNITS: 5000 INJECTION INTRAVENOUS; SUBCUTANEOUS at 08:21

## 2024-02-08 RX ADMIN — CHOLECALCIFEROL TAB 125 MCG (5000 UNIT) 5000 UNITS: 125 TAB at 08:22

## 2024-02-08 RX ADMIN — FORMOTEROL FUMARATE 20 MCG: 20 SOLUTION RESPIRATORY (INHALATION) at 09:21

## 2024-02-08 RX ADMIN — POLYETHYLENE GLYCOL 3350 17 G: 17 POWDER, FOR SOLUTION ORAL at 08:21

## 2024-02-08 RX ADMIN — TAMSULOSIN HYDROCHLORIDE 0.4 MG: 0.4 CAPSULE ORAL at 08:22

## 2024-02-08 RX ADMIN — FAMOTIDINE 10 MG: 20 TABLET, FILM COATED ORAL at 20:17

## 2024-02-08 RX ADMIN — BUDESONIDE 0.5 MG: 0.5 INHALANT ORAL at 21:05

## 2024-02-08 RX ADMIN — FERROUS SULFATE TAB 325 MG (65 MG ELEMENTAL FE) 1 TABLET: 325 (65 FE) TAB at 08:22

## 2024-02-08 RX ADMIN — IPRATROPIUM BROMIDE AND ALBUTEROL SULFATE 3 ML: 2.5; .5 SOLUTION RESPIRATORY (INHALATION) at 21:05

## 2024-02-08 RX ADMIN — TORSEMIDE 10 MG: 20 TABLET ORAL at 08:22

## 2024-02-08 RX ADMIN — POTASSIUM CHLORIDE 10 MEQ: 1500 TABLET, EXTENDED RELEASE ORAL at 20:16

## 2024-02-08 RX ADMIN — IPRATROPIUM BROMIDE AND ALBUTEROL SULFATE 3 ML: 2.5; .5 SOLUTION RESPIRATORY (INHALATION) at 16:32

## 2024-02-08 RX ADMIN — FORMOTEROL FUMARATE 20 MCG: 20 SOLUTION RESPIRATORY (INHALATION) at 21:05

## 2024-02-08 RX ADMIN — IPRATROPIUM BROMIDE AND ALBUTEROL SULFATE 3 ML: 2.5; .5 SOLUTION RESPIRATORY (INHALATION) at 14:26

## 2024-02-08 RX ADMIN — LACTULOSE 20 G: 20 SOLUTION ORAL at 08:21

## 2024-02-08 RX ADMIN — SIMVASTATIN 40 MG: 40 TABLET, FILM COATED ORAL at 20:17

## 2024-02-08 RX ADMIN — HEPARIN SODIUM 5000 UNITS: 5000 INJECTION INTRAVENOUS; SUBCUTANEOUS at 23:09

## 2024-02-08 RX ADMIN — ASPIRIN 81 MG: 81 TABLET, COATED ORAL at 20:17

## 2024-02-08 RX ADMIN — HEPARIN SODIUM 5000 UNITS: 5000 INJECTION INTRAVENOUS; SUBCUTANEOUS at 15:50

## 2024-02-08 RX ADMIN — DAPAGLIFLOZIN 10 MG: 10 TABLET, FILM COATED ORAL at 12:21

## 2024-02-08 RX ADMIN — SENNOSIDES 8.6 MG: 8.6 TABLET, FILM COATED ORAL at 20:17

## 2024-02-08 RX ADMIN — BUDESONIDE 0.5 MG: 0.5 INHALANT ORAL at 09:21

## 2024-02-08 ASSESSMENT — COGNITIVE AND FUNCTIONAL STATUS - GENERAL
DRESSING REGULAR UPPER BODY CLOTHING: A LOT
CLIMB 3 TO 5 STEPS WITH RAILING: TOTAL
DRESSING REGULAR LOWER BODY CLOTHING: A LOT
WALKING IN HOSPITAL ROOM: A LOT
MOBILITY SCORE: 14
TURNING FROM BACK TO SIDE WHILE IN FLAT BAD: A LITTLE
TOILETING: A LOT
MOVING TO AND FROM BED TO CHAIR: A LOT
DAILY ACTIVITIY SCORE: 14
STANDING UP FROM CHAIR USING ARMS: A LOT
EATING MEALS: A LITTLE
PERSONAL GROOMING: A LITTLE
HELP NEEDED FOR BATHING: A LOT

## 2024-02-08 ASSESSMENT — PAIN SCALES - GENERAL
PAINLEVEL_OUTOF10: 0 - NO PAIN

## 2024-02-08 ASSESSMENT — PAIN - FUNCTIONAL ASSESSMENT
PAIN_FUNCTIONAL_ASSESSMENT: 0-10

## 2024-02-08 ASSESSMENT — ACTIVITIES OF DAILY LIVING (ADL)
EFFECT OF PAIN ON DAILY ACTIVITIES: .
HOME_MANAGEMENT_TIME_ENTRY: 42

## 2024-02-08 NOTE — TELEPHONE ENCOUNTER
Sandra Sandra is asking about Rehab recommendations for Pt.  I let her know that typically the hospital handles that.  She was told by the hospital to look into different rehabs with skilled nursing- minimum 21 day stay.     Also- Pt's grandson will be dropping off Family medical leave paperwork to be filled out so he can be the caretaker for the Pt.

## 2024-02-08 NOTE — DISCHARGE INSTRUCTIONS
Follow up with PCP within 1 week of discharge  Follow up with Cardiology within 2 weeks of discharge

## 2024-02-08 NOTE — CARE PLAN
The patient's goals for the shift include      The clinical goals for the shift include paitent will maintain pulse ox >92% this shift      Problem: Chronic Conditions and Co-morbidities  Goal: Patient's chronic conditions and co-morbidity symptoms are monitored and maintained or improved  Outcome: Progressing     Problem: Pain  Goal: Walks with improved pain control throughout the shift  Outcome: Progressing  Goal: Performs ADL's with improved pain control throughout shift  Outcome: Progressing  Goal: Participates in PT with improved pain control throughout the shift  Outcome: Progressing  Goal: Free from opioid side effects throughout the shift  Outcome: Progressing  Goal: Free from acute confusion related to pain meds throughout the shift  Outcome: Progressing     Problem: Dressings Lower Extremities  Goal: STG - Patient to complete lower body dressing MOD I  Outcome: Progressing     Problem: Grooming  Goal: STG - Patient completes grooming MOD I  Outcome: Progressing  Goal: STG - Patient will tolerate standing 4-8 min  Outcome: Progressing     Problem: Nutrition  Goal: Oral intake greater 75%  Outcome: Progressing  Goal: Electrolytes WNL  Outcome: Progressing  Goal: Reduce weight from edema/fluid  Outcome: Progressing     Problem: Skin  Goal: Decreased wound size/increased tissue granulation at next dressing change  Outcome: Progressing     Problem: Discharge Barriers  Goal: My discharge needs are met  Outcome: Progressing

## 2024-02-08 NOTE — PROGRESS NOTES
"Occupational Therapy    OT Treatment    Patient Name: José Antonio Sandra \"Michael\"  MRN: [id number]  Today's Date: 2/8/2024  Time Calculation  Start Time: 1345  Stop Time: 1427  Time Calculation (min): 42 min         Assessment:  Pt in bed upon arrival, pleasant, cooperative. Pt reports feeling ok, rest breaks throughout to promote pacing and to maintain SpO2.   End of Session Communication: Bedside nurse  End of Session Patient Position: Up in chair, Alarm on       Plan:  OT Frequency: 3 times per week  OT Discharge Recommendations: Low intensity level of continued care  Equipment Recommended upon Discharge: Wheeled walker     Subjective      02/08/24 1345   OT Last Visit   OT Received On 02/08/24   General   Prior to Session Communication Bedside nurse   Patient Position Received Bed, 3 rail up   General Comment Pt agreeable to tx and cleared for participation.   Precautions   Hearing/Visual Limitations Pt is very Native, requires increased volume and repetition, can read lips.   Medical Precautions Fall precautions;Oxygen therapy device and L/min   Precautions Comment multiple lines   Vital Signs   SpO2   (SpO2 86% at lowest read while EOB with 2L, titrated to 3L during activity with SpO2 95% throughout, returned to 2L end of tx)   Pain Assessment   Pain Assessment 0-10   Pain Score 0 - No pain   Cognition   Overall Cognitive Status WFL   Grooming   Grooming Level of Assistance Close supervision   Grooming Where Assessed Recliner   Grooming Comments Pt provided with setup of grooming items, completed grooming from seated level with SBA.   Toileting   Toileting Level of Assistance Maximum assistance   Where Assessed Bedside commode   Toileting Comments Pt noted to be slightly incontinent of stool once OOB, Max A for blake care while in stance. Pt later voicing need for BM, BSC obtained and pt completed toileting task. Pt require Max A for hygiene while maintaining B UE support on AD, assist of second person to ensure " safety and maintain balance.   Functional Standing Tolerance   Time ~1 minute   Activity static standing trials   Bed Mobility 1   Bed Mobility 1 Supine to sitting   Level of Assistance 1 Moderate assistance;Moderate verbal cues  (x2)   Bed Mobility Comments 1 HOB elevated, use of draw sheet to assist scooting out toward EOB.   Transfer 1   Technique 1 Sit to stand;Stand to sit   Transfer Device 1 Gait belt;Walker   Transfer Level of Assistance 1 Moderate assistance;+2;Moderate verbal cues;Moderate tactile cues   Trials/Comments 1 STS from various surfaces including EOB, recliner and BSC, v/t cues needed for proper UE/LE placement. Pt ambulated short distance bed>chair and chair<>BSC with Mod A x2, cues for safety including turning and approach to seated surface, pt retropulsive at times.   IP OT Assessment   End of Session Communication Bedside nurse   End of Session Patient Position Up in chair;Alarm on   Inpatient Plan   OT Frequency 3 times per week   OT Discharge Recommendations Low intensity level of continued care   Equipment Recommended upon Discharge Wheeled walker     Outcome Measures:[facility] Daily Activity  Putting on and taking off regular lower body clothing: A lot  Bathing (including washing, rinsing, drying): A lot  Putting on and taking off regular upper body clothing: A lot  Toileting, which includes using toilet, bedpan or urinal: A lot  Taking care of personal grooming such as brushing teeth: A little  Eating Meals: A little  Daily Activity - Total Score: 14  Education Documentation  Body Mechanics, taught by MARIVEL Hollingsworth at 2/8/2024  3:34 PM.  Learner: Patient  Readiness: Acceptance  Method: Explanation  Response: Verbalizes Understanding  Comment: functional transfer safety, pacing, pursed lip breathing    ADL Training, taught by MARIVEL Hollingsworth at 2/8/2024  3:34 PM.  Learner: Patient  Readiness: Acceptance  Method: Explanation  Response: Verbalizes Understanding  Comment: functional  transfer safety, pacing, pursed lip breathing    Education Comments  No comments found.            Goals:  Encounter Problems       Encounter Problems (Active)       Dressings Lower Extremities       STG - Patient to complete lower body dressing MOD I (Progressing)       Start:  02/02/24    Expected End:  02/07/24               Grooming       STG - Patient completes grooming MOD I (Progressing)       Start:  02/02/24    Expected End:  02/07/24            STG - Patient will tolerate standing 4-8 min (Progressing)       Start:  02/02/24    Expected End:  02/07/24               Transfers       STG - Patient will perform bed mobility MOD I       Start:  02/02/24    Expected End:  02/10/24            STG - Patient will perform toilet transfer MOD I       Start:  02/02/24    Expected End:  02/10/24

## 2024-02-08 NOTE — PROGRESS NOTES
"Michael Sandra is a 89 y.o. male on day 7 of admission presenting with Acute on chronic diastolic heart failure (CMS/HCC).    Subjective   Symptoms (0 - 10, Best to Worst)  Bruno Symptom Assessment System  Pain Score: 0 - No pain  denies       Objective     Physical Exam  Constitutional:       Appearance: He is ill-appearing.   HENT:      Head: Normocephalic.      Nose: Nose normal.      Mouth/Throat:      Mouth: Mucous membranes are dry.      Pharynx: Oropharynx is clear.   Eyes:      Conjunctiva/sclera: Conjunctivae normal.   Cardiovascular:      Rate and Rhythm: Normal rate and regular rhythm.      Pulses: Normal pulses.      Heart sounds: Murmur heard.   Pulmonary:      Effort: Respiratory distress present.      Breath sounds: Rhonchi present.   Abdominal:      General: Bowel sounds are normal.      Palpations: Abdomen is soft.   Musculoskeletal:      Cervical back: Rigidity present.   Skin:     General: Skin is dry.      Capillary Refill: Capillary refill takes 2 to 3 seconds.   Neurological:      Mental Status: He is alert.      Sensory: Sensory deficit present.      Motor: Weakness present.      Gait: Gait abnormal.         Last Recorded Vitals  Blood pressure 113/59, pulse 84, temperature 36.8 °C (98.2 °F), temperature source Temporal, resp. rate (!) 34, height 1.676 m (5' 6\"), weight 66.7 kg (147 lb 0.8 oz), SpO2 97 %.  Intake/Output last 3 Shifts:  I/O last 3 completed shifts:  In: 580 (8.7 mL/kg) [P.O.:580]  Out: 1800 (27 mL/kg) [Urine:1800 (0.7 mL/kg/hr)]  Weight: 66.7 kg     Relevant Results  [Held by provider] amLODIPine, 10 mg, oral, Daily  aspirin, 81 mg, oral, Nightly  budesonide, 0.5 mg, nebulization, BID  cholecalciferol, 5,000 Units, oral, Daily  famotidine, 10 mg, oral, Nightly  ferrous sulfate (325 mg ferrous sulfate), 65 mg of iron, oral, Daily  formoterol, 20 mcg, nebulization, BID  heparin (porcine), 5,000 Units, subcutaneous, q8h  ipratropium-albuteroL, 3 mL, nebulization, 4x " daily  lactulose, 20 g, oral, BID  [Held by provider] losartan, 50 mg, oral, Daily  metoprolol succinate XL, 25 mg, oral, Daily  polyethylene glycol, 17 g, oral, Daily  potassium chloride CR, 10 mEq, oral, Nightly  sennosides, 1 tablet, oral, Nightly  simvastatin, 40 mg, oral, Nightly  tamsulosin, 0.4 mg, oral, Daily  torsemide, 10 mg, oral, Daily       Results for orders placed or performed during the hospital encounter of 02/01/24 (from the past 24 hour(s))   CBC   Result Value Ref Range    WBC 9.1 4.4 - 11.3 x10*3/uL    nRBC 0.0 0.0 - 0.0 /100 WBCs    RBC 2.91 (L) 4.50 - 5.90 x10*6/uL    Hemoglobin 8.3 (L) 13.5 - 17.5 g/dL    Hematocrit 27.0 (L) 41.0 - 52.0 %    MCV 93 80 - 100 fL    MCH 28.5 26.0 - 34.0 pg    MCHC 30.7 (L) 32.0 - 36.0 g/dL    RDW 13.1 11.5 - 14.5 %    Platelets 120 (L) 150 - 450 x10*3/uL   Basic Metabolic Panel   Result Value Ref Range    Glucose 129 (H) 74 - 99 mg/dL    Sodium 138 136 - 145 mmol/L    Potassium 5.1 3.5 - 5.3 mmol/L    Chloride 100 98 - 107 mmol/L    Bicarbonate 32 21 - 32 mmol/L    Anion Gap 11 10 - 20 mmol/L    Urea Nitrogen 56 (H) 6 - 23 mg/dL    Creatinine 1.70 (H) 0.50 - 1.30 mg/dL    eGFR 37 (L) >60 mL/min/1.73m*2    Calcium 8.5 (L) 8.6 - 10.3 mg/dL   Magnesium   Result Value Ref Range    Magnesium 2.32 1.60 - 2.40 mg/dL    ECG 12 Lead    Result Date: 2/8/2024  Sinus rhythm with Premature atrial complexes Voltage criteria for left ventricular hypertrophy Nonspecific ST abnormality Abnormal ECG When compared with ECG of 01-FEB-2024 14:23, T wave amplitude has decreased in Lateral leads Confirmed by Michael Sharma ([id number]) on 2/8/2024 8:47:25 AM    XR chest 1 view    Result Date: 2/4/2024  Interpreted By:  Jamison Lagunas, STUDY: XR CHEST 1 VIEW;  2/3/2024 11:32 am   INDICATION: Signs/Symptoms:hypoxia.   COMPARISON: CT chest and chest radiograph 02/01/2024   ACCESSION NUMBER(S): [id number]   ORDERING CLINICIAN: EVELYNE CARRILLO   FINDINGS: AP radiograph of the chest was  provided.   DEVICES: None.   CARDIOMEDIASTINAL SILHOUETTE: Cardiomediastinal silhouette is stable in size and configuration.Atherosclerotic calcifications of the aortic arch.   LUNGS: Bilateral low lung volumes. Interval worsening interstitial thickening and airspace opacities predominantly in the left mid to lower lung zones. Moderate left pleural effusion, also new since prior. No pneumothorax.   BONES: No acute osseous changes.       1.  Interval worsening since 02/01/2024.     Signed by: Jamison Lagunas 2/4/2024 12:05 PM Dictation workstation:   [id number]    Electrocardiogram, 12-lead PRN ACS symptoms    Result Date: 2/3/2024  Sinus rhythm with occasional nonconducted premature atrial complexes Moderate voltage criteria for LVH, may be normal variant Late transition Borderline ECG When compared with ECG of 24-DEC-2023 15:58, (unconfirmed) Sinus rhythm has replaced Atrial fibrillation Non-specific change in ST segment in Lateral leads Confirmed by Michael Sharma ([id number]) on 2/3/2024 10:51:26 AM    ECG 12 lead    Result Date: 2/2/2024  Sinus rhythm with Premature atrial complexes Moderate voltage criteria for LVH, may be normal variant Borderline ECG When compared with ECG of 01-FEB-2024 12:49, (unconfirmed) Premature atrial complexes are now Present Confirmed by Avtar Cm ([id number]) on 2/2/2024 1:26:52 PM    ECG 12 lead    Result Date: 2/2/2024  Sinus rhythm with marked sinus arrhythmia Moderate voltage criteria for LVH, may be normal variant Borderline ECG When compared with ECG of 24-DEC-2023 15:58, (unconfirmed) Sinus rhythm has replaced Atrial fibrillation Non-specific change in ST segment in Lateral leads Confirmed by Avtar Cm ([id number]) on 2/2/2024 1:25:38 PM    Transthoracic Echo (TTE) Limited    Result Date: 2/2/2024            [facility] [id number] [id number]    Tel [phone] Fax [phone] TRANSTHORACIC ECHOCARDIOGRAM REPORT  Patient Name:      MICHELLE FAUST MELISSA     Reading Physician:   [id number] Armando Ricks MD Study Date:        2/2/2024            Ordering Provider:   [id number] TIFFANY JORDAN MRN/PID:           [id number]            Fellow: Accession#:        [id number]        Nurse: Date of Birth/Age: 1/10/1931 / 89      Sonographer:         Ana Laura Dalton RDCS                    years Gender:            M                   Additional Staff: Height:            167.64 cm           Admit Date:          2/1/2024 Weight:            72.12 kg            Admission Status:    Inpatient - Routine BSA:               1.81 m2             Department Location: [facility] Echo Lab Blood Pressure: 131 /69 mmHg Study Type:    TRANSTHORACIC ECHO (TTE) LIMITED Diagnosis/ICD: Chest pain, unspecified-R07.9; Acute on chronic diastolic                (congestive) heart failure (CHF)-I50.33 Indication:    CP, CHF CPT Codes:     Echo Limited-[id number]  Study Detail: The following Echo studies were performed: 2D, M-Mode, Doppler and               color flow.  PHYSICIAN INTERPRETATION: Left Ventricle: Left ventricular systolic function is moderately decreased, with an estimated ejection fraction of 35-40%. There is global hypokinesis of the left ventricle with minor regional variations. The left ventricular cavity size is normal. Spectral Doppler shows a pseudonormal pattern of left ventricular diastolic filling. Left Atrium: The left atrium was not assessed. Right Ventricle: The right ventricle is normal in size. There is normal right ventricular global systolic function. Right Atrium: The right atrium was not assessed. Aortic Valve: The aortic valve is probably trileaflet. There is moderate aortic valve cusp calcification. There is moderate aortic valve thickening. There is evidence of severe aortic valve stenosis. There is trivial aortic valve regurgitation. Probably severe AS. Recommend additional measurements if clinically indicated. Mitral Valve: The  mitral valve is normal in structure. There is moderate mitral valve regurgitation. Tricuspid Valve: The tricuspid valve is structurally normal. There is moderate tricuspid regurgitation. The Doppler estimated RVSP is severely elevated at 66.0 mmHg. Pulmonic Valve: The pulmonic valve is structurally normal. There is no indication of pulmonic valve regurgitation. Pericardium: There is no pericardial effusion noted. Aorta: The aortic root was not assessed. Systemic Veins: The inferior vena cava appears moderately dilated. In comparison to the previous echocardiogram(s): Prior examinations are available and were reviewed for comparison purposes. Compared to 10/2023 study the LVEF is worse.  CONCLUSIONS:  1. Left ventricular systolic function is moderately decreased with a 35-40% estimated ejection fraction.  2. Spectral Doppler shows a pseudonormal pattern of left ventricular diastolic filling.  3. Moderate mitral valve regurgitation.  4. Moderate tricuspid regurgitation.  5. Severely elevated right ventricular systolic pressure.  6. Probably severe AS. Recommend additional measurements if clinically indicated.  7. Severe aortic valve stenosis.  8. There is moderate aortic valve cusp calcification.  9. The inferior vena cava appears moderately dilated. 10. Compared to 10/2023 study the LVEF is worse. 11. There is global hypokinesis of the left ventricle with minor regional variations. QUANTITATIVE DATA SUMMARY: 2D MEASUREMENTS:                           Normal Ranges: IVSd:          1.10 cm    (0.6-1.1cm) LVPWd:         1.00 cm    (0.6-1.1cm) LVIDd:         5.10 cm    (3.9-5.9cm) LVIDs:         4.00 cm LV Mass Index: 110.7 g/m2 LV % FS        21.6 % LV SYSTOLIC FUNCTION BY 2D PLANIMETRY (MOD):                     Normal Ranges: EF-A4C View: 38.0 % (>=55%) EF-A2C View: 38.9 % EF-Biplane:  38.9 % LV DIASTOLIC FUNCTION:                        Normal Ranges: MV Peak E:    1.46 m/s (0.7-1.2 m/s) MV Peak A:    1.12 m/s  (0.42-0.7 m/s) E/A Ratio:    1.30     (1.0-2.2) MV e'         0.05 m/s (>8.0) MV lateral e' 0.06 m/s MV medial e'  0.05 m/s E/e' Ratio:   27.37    (<8.0) MITRAL VALVE:                 Normal Ranges: MV DT: 174 msec (150-240msec) AORTIC VALVE:                         Normal Ranges: LVOT Max Wilner:  0.86 m/s (<=1.1m/s) LVOT VTI:      18.70 cm LVOT Diameter: 2.10 cm  (1.8-2.4cm)  RIGHT VENTRICLE: TAPSE: 20.3 mm RV s'  0.08 m/s TRICUSPID VALVE/RVSP:                             Normal Ranges: Peak TR Velocity: 3.97 m/s RV Syst Pressure: 66.0 mmHg (< 30mmHg)  [id number] Armando Ricks MD Electronically signed on 2/2/2024 at 10:28:08 AM  ** Final **     CT chest abdomen pelvis wo IV contrast    Result Date: 2/1/2024  Interpreted By:  Noa Kwon, STUDY: CT CHEST ABDOMEN PELVIS WO CONTRAST;  2/1/2024 5:02 pm   INDICATION: Signs/Symptoms:chest pain, c/f free air on CXR.   COMPARISON: Chest x-ray performed on this day.   ACCESSION NUMBER(S): [id number]   ORDERING CLINICIAN: MICKEY HONEYCUTT   TECHNIQUE: CT of the chest, abdomen and pelvis was performed. Contiguous axial images were obtained at 3 mm slice thickness through the chest, abdomen and pelvis. Coronal and sagittal reconstructions at 3 mm slice thickness were performed.  No intravenous contrast was administered; positive oral contrast was given.   FINDINGS: Please note that the study is limited without intravenous contrast. The scan is also limited by artifacts arising from the patient's arms that are kept by his sides.   CHEST:   LUNG/PLEURA/LARGE AIRWAYS: Tracheobronchial tree this patent.  There are infiltrate/atelectasis in the posterior lower lobes. No pleural effusion. No suspicious pulmonary nodules..   VESSELS: Mild dilatation of the ascending aorta measured at 4.3 cm. Pulmonary arteries normal in caliber. Dense three-vessel coronary arterial calcifications. The scan is not optimized for coronary calcium scoring.   HEART: Heart is moderately enlarged. No pericardial  effusion.   MEDIASTINUM AND GLORIA: No mediastinal lymphadenopathy.  No large hilar lymph nodes on this nonenhanced scan. Esophagus is unremarkable   CHEST WALL AND LOWER NECK: Severe thoracic kyphosis.. Thyroid is not clearly visualized.   ABDOMEN:   LIVER: Unremarkable.   BILE DUCTS: Unremarkable.   GALLBLADDER: Tiny gallstones but with no signs of acute cholecystitis..   PANCREAS: Atrophic pancreas.   SPLEEN: Unremarkable.   ADRENAL GLANDS: Unremarkable.   KIDNEYS AND URETERS: Normal in size. Multiple bilateral cysts measured up to 7 cm. No hydronephrosis. No urinary tract calculi or obstruction.   PELVIS:   BLADDER: Unremarkable.   REPRODUCTIVE ORGANS: Brachytherapy seeds in the prostate. No pelvic masses.   BOWEL: Colon interposition anteriorly to liver which is responsible for the questionable free air on the recent chest radiograph. Stomach and intestine are otherwise unremarkable. No intestinal wall thickening. Appendix is identified..   VESSELS: Arteriosclerotic changes. Abdominal aorta and IVC are normal in caliber.   PERITONEUM/RETROPERITONEUM/LYMPH NODES: No evidence of lymphadenopathy. Retroperitoneal is unremarkable. No free fluid or free air.   ABDOMINAL WALL: A small right inguinal hernia which contains fat and fluid.   BONES: Unremarkable. Grade 1 anterolisthesis of C4-C5. No suspicious bone lesions.       Chest 1. Infiltrate/atelectasis in the posterior lower lobes. Rule out pneumonia. No pleural effusion. 2. Mild dilatation of the ascending aorta measured at 4.3 cm in diameter. 3. Exaggerated thoracic kyphosis. 4. Cardiomegaly.   Abdomen-Pelvis 1.  No evidence of free air. Colon interposition anteriorly to the liver which corresponds to the findings noted on the recent chest radiograph. 2. Multiple renal cysts. No hydronephrosis. 3. Tiny gallstones but with no signs of acute cholecystitis 4. A small right inguinal hernia containing fat and fluid. 5. Brachytherapy seeds in the prostate.     Signed  by: Noa Kwon 2/1/2024 5:59 PM Dictation workstation:   [id number]    XR chest 1 view    Result Date: 2/1/2024  Interpreted By:  Eze Ashley, STUDY: XR CHEST 1 VIEW;  2/1/2024 2:03 pm   INDICATION: Signs/Symptoms:Chest Pain.   COMPARISON: 12/24/2023   ACCESSION NUMBER(S): [id number]   ORDERING CLINICIAN: STEFANIA MACKEY   FINDINGS: Low lung volumes with vascular crowding. Borderline heart size. Atherosclerosis. Diffuse interstitial pulmonary opacities suggestive of pulmonary edema. Questionable small left pleural effusion. Gas seen deep to the right hemidiaphragm, free air versus bowel gas.       Gas deep to the right hemidiaphragm, free air versus bowel gas. Consider follow-up with decubitus radiographs and/or CT of the abdomen and pelvis as warranted.   Cardiomegaly and pulmonary edema.   Eze Ashley discussed the significance and urgency of this critical finding by telephone with  STEFANIA MACKEY on 2/1/2024 at 2:28 pm.  (**-RCF-**) Findings:  See findings.     Signed by: Eze Ashley 2/1/2024 2:29 PM Dictation workstation:   [id number]               Assessment/Plan   IMP:  Met with patient with family present. Assessment is that patient is end stage heart failure and is weak and frail in appearance. Patient endorses being very [place]. He does read some lips and does much better when holding conversations face to face in close proximity. We discussed options of care going forward as this patient is eligible for Hospice but the patient himself prefers to try skilled and therapy prior to making a decision for Hospice.     Plan:  Plan is to select a skilled facility in the area, get therapy to see if he can regain some strength and then potentially discharge home with [facility] and Palliative Care support. If therapy proves too much, family will contact Hospice and set [facility] into place. Patient does have a Living Will and this was placed on the chart along with a DNRCCA/DNI per patient's  wishes.   Family wihses to meet with [facility] and obtain information about Hospice care as they realize his condition is tenuous and he may not be able to succeed in skilled environment. Family is here from [place] and would like to meet tomorrow so that when they leave, there is a plan A and B in place. They are also interested in Palliative Care for their mother who is in her 90's as well. Dr Russell is their PCD.     Symptom Management  Pain: denies  Medications recommended for pain?  NA  Tiredness: yes  Nausea: denies  Depression: denies  Anxiety: yes at times  Drowsiness: yes  Appetite: poor  Wellbeing: patient states he is improving  Dyspnea: yes  Intervention recommended for dyspnea?  yes  Other: Caregiver support  Intervention recommended for constipation?  NA    Provider estimate of survival: 1-6 months  Patient Prognostic Awareness: No - not appropriate for current discussion    Patient/proxy preference for information  Prefers full information    Goals of Care  Goals of care at this time are to provide family and patient with information on both skilled and Hospice options of care.     Is the patient hospice-eligible?   Yes  Was a discussion held re hospice services?   yes  Was a decision made re hospice services?  Meeting with MICHELLE tomorrow.     Other Palliative Support  Caregiver support    I spent 90 minutes in the review, planning and care of this patient.             Meera Mccoy, APRN-CNS

## 2024-02-08 NOTE — PROGRESS NOTES
Michael Sandra is a 89 y.o. male on day 7 of admission presenting with Acute on chronic diastolic heart failure (CMS/HCC).      Subjective   Patient seen and examined  Patient wore bipap HS overnight, currently on 4 lpm nasal cannula, 2 lpm nasal cannula is baseline  Feeding himself breakfast  Denies any complaints  Daughter in law at bedside, updated with plan of care, she states they received SNF list yesterday and patient's son is looking into    Objective     Last Recorded Vitals  /56 (BP Location: Left arm, Patient Position: Lying)   Pulse 76   Temp 36.7 °C (98.1 °F) (Temporal)   Resp 26   Wt 66.7 kg (147 lb 0.8 oz)   SpO2 94%   Intake/Output last 3 Shifts:    Intake/Output Summary (Last 24 hours) at 2/8/2024 1143  Last data filed at 2/8/2024 1000  Gross per 24 hour   Intake 600 ml   Output 1150 ml   Net -550 ml         Admission Weight  Weight: 69.4 kg (153 lb) (02/01/24 1253)    Daily Weight  02/08/24 : 66.7 kg (147 lb 0.8 oz)    Physical Exam  Gen: NAD, alert oriented, awake, pleasant cooperative  HEENT: MMM, EOMI, no scleral icterus  Neck: supple, no LAD  CV: irregularly irregular  Lungs: CTA upper lung fields, diminished at bases, no conversational dyspnea, on 4 lpm nasal cannula  Abd: soft, nontender, nondistended, normoactive BS  Ext: WWP, no lower extremity pitting edema  Skin: no rashes/lesions  Neuro: Cns III-XII grossly intact, alert, oriented     Scheduled medications  [Held by provider] amLODIPine, 10 mg, oral, Daily  aspirin, 81 mg, oral, Nightly  budesonide, 0.5 mg, nebulization, BID  cholecalciferol, 5,000 Units, oral, Daily  dapagliflozin propanediol, 10 mg, oral, Daily  famotidine, 10 mg, oral, Nightly  ferrous sulfate (325 mg ferrous sulfate), 65 mg of iron, oral, Daily  formoterol, 20 mcg, nebulization, BID  heparin (porcine), 5,000 Units, subcutaneous, q8h  ipratropium-albuteroL, 3 mL, nebulization, 4x daily  lactulose, 20 g, oral, BID  [Held by provider] losartan, 50 mg, oral,  Daily  metoprolol succinate XL, 25 mg, oral, Daily  polyethylene glycol, 17 g, oral, Daily  potassium chloride CR, 10 mEq, oral, Nightly  sennosides, 1 tablet, oral, Nightly  simvastatin, 40 mg, oral, Nightly  tamsulosin, 0.4 mg, oral, Daily  torsemide, 10 mg, oral, Daily      Continuous medications       PRN medications  PRN medications: acetaminophen, albuterol, diclofenac sodium, oxygen, oxygen    Results for orders placed or performed during the hospital encounter of 02/01/24 (from the past 24 hour(s))   CBC   Result Value Ref Range    WBC 9.1 4.4 - 11.3 x10*3/uL    nRBC 0.0 0.0 - 0.0 /100 WBCs    RBC 2.91 (L) 4.50 - 5.90 x10*6/uL    Hemoglobin 8.3 (L) 13.5 - 17.5 g/dL    Hematocrit 27.0 (L) 41.0 - 52.0 %    MCV 93 80 - 100 fL    MCH 28.5 26.0 - 34.0 pg    MCHC 30.7 (L) 32.0 - 36.0 g/dL    RDW 13.1 11.5 - 14.5 %    Platelets 120 (L) 150 - 450 x10*3/uL   Basic Metabolic Panel   Result Value Ref Range    Glucose 129 (H) 74 - 99 mg/dL    Sodium 138 136 - 145 mmol/L    Potassium 5.1 3.5 - 5.3 mmol/L    Chloride 100 98 - 107 mmol/L    Bicarbonate 32 21 - 32 mmol/L    Anion Gap 11 10 - 20 mmol/L    Urea Nitrogen 56 (H) 6 - 23 mg/dL    Creatinine 1.70 (H) 0.50 - 1.30 mg/dL    eGFR 37 (L) >60 mL/min/1.73m*2    Calcium 8.5 (L) 8.6 - 10.3 mg/dL   Magnesium   Result Value Ref Range    Magnesium 2.32 1.60 - 2.40 mg/dL       XR chest 1 view    Result Date: 2/4/2024  Interpreted By:  Jamison Lagunas, STUDY: XR CHEST 1 VIEW;  2/3/2024 11:32 am   INDICATION: Signs/Symptoms:hypoxia.   COMPARISON: CT chest and chest radiograph 02/01/2024   ACCESSION NUMBER(S): [id number]   ORDERING CLINICIAN: EVELYNE CARRILLO   FINDINGS: AP radiograph of the chest was provided.   DEVICES: None.   CARDIOMEDIASTINAL SILHOUETTE: Cardiomediastinal silhouette is stable in size and configuration.Atherosclerotic calcifications of the aortic arch.   LUNGS: Bilateral low lung volumes. Interval worsening interstitial thickening and airspace opacities  predominantly in the left mid to lower lung zones. Moderate left pleural effusion, also new since prior. No pneumothorax.   BONES: No acute osseous changes.       1.  Interval worsening since 02/01/2024.     Signed by: Jamison Lagunas 2/4/2024 12:05 PM Dictation workstation:   [id number]         Assessment/Plan     Principal Problem:    Acute on chronic diastolic heart failure (CMS/HCC)  Active Problems:    Aortic stenosis    Dyspnea on exertion    Gastroesophageal reflux disease    Hard of hearing    Malignant neoplasm of prostate (CMS/HCC)    Chronic respiratory failure with hypoxia (CMS/[facility])    Essential hypertension    Acute on chronic congestive heart failure, unspecified heart failure type (CMS/[facility])  #acute systolic heart failure  #acute on chronic hypoxic respiratory failure  #severe aortic stenosis  #COPD    Plan:    -currently on 4 lpm nasal cannula, wears 2 lpm nasal cannula at baseline  -Cardiology following: Optimize medical management by continuing metoprolol succinate. Additionally, Farxiga will be started, with a plan to start Entresto tomorrow on 2/9  -renal function improving  -cont ASA,cont statin, cont ICS, DIANE, LABA,-cont albuterol PRN  -hemoglobin 8.3,  post 1 uPRBC on hospitalization  -Misc: cont rest of home meds as ordered  -cardiac diet  -am labs including cbc, bmp, mag  -POC discussed with patient and attending at bedside  -dispo: following cardiology recommendations, likely ready for DC tomorrow  -PT OT, am pac 12, dc planning, likely need SNF, family aware and looking into choices      Code: full    ERIC [facility]  [address] [address]  Phone: [phone] Fax: [phone]

## 2024-02-08 NOTE — CARE PLAN
The clinical goals for the shift include paitent will maintain pulse ox >92% this shift  S: Patient admitted 2/1/24 for acute on chronic heart failure .  B: History of CHF, aortic stenosis, COPD.  A: patient A+Ox 4, denies pain, denies SOB this shift, tolerates bipap fairly, able to wear it most of the night. Pulse ox remains >92%, afebrile, continues to have decreasing daily weight measurements.   R: Patient from home unsure of discharge plan .

## 2024-02-08 NOTE — NURSING NOTE
Cardiovascular Nurse Navigator Education Follow-Up    Met with patient's family this morning and addressed questions. Plan in for medical management and this was reinforced, along with lifestyle modification. They are in the process of determining the most appropriate plan of care for pt moving forward. Palliative care and SNF placement discussed. I will round tomorrow to continue to support and reinforce.

## 2024-02-08 NOTE — PROGRESS NOTES
"Physical Therapy    Physical Therapy    Physical Therapy Treatment    Patient Name: José Antonio Sandra \"Michael\"  MRN: [id number]  Today's Date: 2/8/2024  Time Calculation  Start Time: 1344  Stop Time: 1414  Time Calculation (min): 30 min        02/08/24 1344   PT  Visit   PT Received On 02/08/24   Response to Previous Treatment Patient with no complaints from previous session.   General   Prior to Session Communication Bedside nurse   Patient Position Received Bed, 3 rail up   General Comment Pt agreeable to therapy and cleared for participation   Precautions   Medical Precautions Fall precautions   Pain Assessment   Pain Assessment 0-10   Pain Score 0 - No pain   Effect of Pain on Daily Activities .   Cognition   Overall Cognitive Status WFL   Therapeutic Exercise   Therapeutic Exercise Performed Yes   Therapeutic Exercise Activity 1 AP/LAQ x10   Balance/Neuromuscular Re-Education   Balance/Neuromuscular Re-Education Activity Performed Yes   Balance/Neuromuscular Re-Education Activity 1 sitting and standing activities. 3 standing trials using B UE support and modAx1-2 to maintain midline d/t retropulsion. Max cues for anterior weight shift to maintain midline.   Bed Mobility   Bed Mobility Yes   Bed Mobility 1   Bed Mobility 1 Supine to sitting   Level of Assistance 1 Moderate assistance;Moderate verbal cues;Moderate tactile cues   Bed Mobility Comments 1 x2   Ambulation/Gait Training   Ambulation/Gait Training Performed Yes   Ambulation/Gait Training 1   Surface 1 Level tile   Device 1 Rolling walker   Gait Support Devices Gait belt   Assistance 1 Moderate assistance;Moderate verbal cues;Moderate tactile cues  (x2)   Quality of Gait 1 Diminished heel strike;Shuffling gait   Comments/Distance (ft) 1 x6'  modAx2 d/t multiple lines and retropulsion. Short shuffling steps with max cues for sequencing   Transfers   Transfer Yes   Transfer 1   Transfer From 1 Bed to   Transfer to 1 Chair with arms;Commode-standard "   Technique 1 Sit to stand;Stand to sit   Transfer Device 1 Walker;Gait belt   Transfer Level of Assistance 1 Moderate assistance;Moderate verbal cues;Moderate tactile cues;+2   Trials/Comments 1 Multiple transfers from bed>chair>BSC>chair. Retropulsive, mod cues for anterior weight shift, hand placement and sequencing with fair follow through.   Activity Tolerance   Endurance Tolerates 10 - 20 min exercise with multiple rests   PT Assessment   PT Assessment Results Decreased strength;Decreased endurance;Impaired balance;Decreased mobility   Rehab Prognosis Good   End of Session Communication Bedside nurse   Assessment Comment Pt is pleasant and cooperative, very retropulsive requiring modAx2 for all activities. Pt is very Lummi.Pt is incontinent of stool upon standing, increased time for blake care.   End of Session Patient Position Up in chair;Alarm on         Outcome Measures:  [facility] Basic Mobility  Turning from your back to your side while in a flat bed without using bedrails: None  Moving from lying on your back to sitting on the side of a flat bed without using bedrails: A little  Moving to and from bed to chair (including a wheelchair): A lot  Standing up from a chair using your arms (e.g. wheelchair or bedside chair): A lot  To walk in hospital room: A lot  Climbing 3-5 steps with railing: Total  Basic Mobility - Total Score: 14                             EDUCATION:  Outpatient Education  Individual(s) Educated: Patient  Education Provided: Fall Risk  Education Documentation  Mobility Training, taught by Gloria Dukes PTA at 2/8/2024  2:30 PM.  Learner: Family, Patient  Readiness: Acceptance  Method: Explanation  Response: Verbalizes Understanding, Demonstrated Understanding, Needs Reinforcement    Body Mechanics, taught by Gloria Dukes PTA at 2/8/2024  2:30 PM.  Learner: Family, Patient  Readiness: Acceptance  Method: Explanation  Response: Verbalizes Understanding, Demonstrated Understanding, Needs  Reinforcement    ADL Training, taught by Gloria Dukes PTA at 2/8/2024  2:30 PM.  Learner: Family, Patient  Readiness: Acceptance  Method: Explanation  Response: Verbalizes Understanding, Demonstrated Understanding, Needs Reinforcement    Education Comments  No comments found.        GOALS:  Encounter Problems       Encounter Problems (Active)       PT Problem       Bed mobility (Progressing)       Start:  02/02/24    Expected End:  02/10/24       Pt will perform supine<>sit with HOB flat, MARTIN.           Transfers (Progressing)       Start:  02/02/24    Expected End:  02/10/24       Pt will perform all transfers with LRAD, MARTIN.            Gait (Progressing)       Start:  02/02/24    Expected End:  02/10/24       Pt will amb 150' with LRAD, MARTIN with reciprocal gait, upright posture and improved activity tolerance as demonstrated by vitals.           Balance (Progressing)       Start:  02/02/24    Expected End:  02/10/24       Pt will tolerate standing x2 min without BUE support, SBAx1.           Strength (Progressing)       Start:  02/02/24    Expected End:  02/10/24       Pt will improve gross BLE strength to 4-/5.            Pain - Adult          Transfers       STG - Patient will perform bed mobility MOD I       Start:  02/02/24    Expected End:  02/10/24            STG - Patient will perform toilet transfer MOD I       Start:  02/02/24    Expected End:  02/10/24

## 2024-02-09 LAB
ANION GAP SERPL CALC-SCNC: 8 MMOL/L (ref 10–20)
BUN SERPL-MCNC: 54 MG/DL (ref 6–23)
CALCIUM SERPL-MCNC: 8.2 MG/DL (ref 8.6–10.3)
CHLORIDE SERPL-SCNC: 103 MMOL/L (ref 98–107)
CO2 SERPL-SCNC: 33 MMOL/L (ref 21–32)
CREAT SERPL-MCNC: 1.73 MG/DL (ref 0.5–1.3)
EGFRCR SERPLBLD CKD-EPI 2021: 36 ML/MIN/1.73M*2
ERYTHROCYTE [DISTWIDTH] IN BLOOD BY AUTOMATED COUNT: 13.3 % (ref 11.5–14.5)
GLUCOSE SERPL-MCNC: 111 MG/DL (ref 74–99)
HCT VFR BLD AUTO: 27.7 % (ref 41–52)
HGB BLD-MCNC: 8.2 G/DL (ref 13.5–17.5)
MAGNESIUM SERPL-MCNC: 2.36 MG/DL (ref 1.6–2.4)
MCH RBC QN AUTO: 28.6 PG (ref 26–34)
MCHC RBC AUTO-ENTMCNC: 29.6 G/DL (ref 32–36)
MCV RBC AUTO: 97 FL (ref 80–100)
NRBC BLD-RTO: 0 /100 WBCS (ref 0–0)
PLATELET # BLD AUTO: 121 X10*3/UL (ref 150–450)
POTASSIUM SERPL-SCNC: 5.1 MMOL/L (ref 3.5–5.3)
RBC # BLD AUTO: 2.87 X10*6/UL (ref 4.5–5.9)
SODIUM SERPL-SCNC: 139 MMOL/L (ref 136–145)
WBC # BLD AUTO: 9.8 X10*3/UL (ref 4.4–11.3)

## 2024-02-09 PROCEDURE — 99232 SBSQ HOSP IP/OBS MODERATE 35: CPT | Performed by: INTERNAL MEDICINE

## 2024-02-09 PROCEDURE — 2500000002 HC RX 250 W HCPCS SELF ADMINISTERED DRUGS (ALT 637 FOR MEDICARE OP, ALT 636 FOR OP/ED): Performed by: STUDENT IN AN ORGANIZED HEALTH CARE EDUCATION/TRAINING PROGRAM

## 2024-02-09 PROCEDURE — 2500000001 HC RX 250 WO HCPCS SELF ADMINISTERED DRUGS (ALT 637 FOR MEDICARE OP): Performed by: INTERNAL MEDICINE

## 2024-02-09 PROCEDURE — 2500000001 HC RX 250 WO HCPCS SELF ADMINISTERED DRUGS (ALT 637 FOR MEDICARE OP): Performed by: STUDENT IN AN ORGANIZED HEALTH CARE EDUCATION/TRAINING PROGRAM

## 2024-02-09 PROCEDURE — 85027 COMPLETE CBC AUTOMATED: CPT | Performed by: NURSE PRACTITIONER

## 2024-02-09 PROCEDURE — 2060000001 HC INTERMEDIATE ICU ROOM DAILY

## 2024-02-09 PROCEDURE — 99232 SBSQ HOSP IP/OBS MODERATE 35: CPT

## 2024-02-09 PROCEDURE — 2500000001 HC RX 250 WO HCPCS SELF ADMINISTERED DRUGS (ALT 637 FOR MEDICARE OP): Performed by: NURSE PRACTITIONER

## 2024-02-09 PROCEDURE — 36415 COLL VENOUS BLD VENIPUNCTURE: CPT | Performed by: NURSE PRACTITIONER

## 2024-02-09 PROCEDURE — 94640 AIRWAY INHALATION TREATMENT: CPT

## 2024-02-09 PROCEDURE — 2500000001 HC RX 250 WO HCPCS SELF ADMINISTERED DRUGS (ALT 637 FOR MEDICARE OP)

## 2024-02-09 PROCEDURE — 99232 SBSQ HOSP IP/OBS MODERATE 35: CPT | Performed by: NURSE PRACTITIONER

## 2024-02-09 PROCEDURE — 2500000004 HC RX 250 GENERAL PHARMACY W/ HCPCS (ALT 636 FOR OP/ED): Performed by: STUDENT IN AN ORGANIZED HEALTH CARE EDUCATION/TRAINING PROGRAM

## 2024-02-09 PROCEDURE — 80048 BASIC METABOLIC PNL TOTAL CA: CPT | Performed by: NURSE PRACTITIONER

## 2024-02-09 PROCEDURE — 2500000004 HC RX 250 GENERAL PHARMACY W/ HCPCS (ALT 636 FOR OP/ED)

## 2024-02-09 PROCEDURE — 83735 ASSAY OF MAGNESIUM: CPT | Performed by: NURSE PRACTITIONER

## 2024-02-09 RX ADMIN — FORMOTEROL FUMARATE 20 MCG: 20 SOLUTION RESPIRATORY (INHALATION) at 09:56

## 2024-02-09 RX ADMIN — FORMOTEROL FUMARATE 20 MCG: 20 SOLUTION RESPIRATORY (INHALATION) at 20:57

## 2024-02-09 RX ADMIN — IPRATROPIUM BROMIDE AND ALBUTEROL SULFATE 3 ML: 2.5; .5 SOLUTION RESPIRATORY (INHALATION) at 09:56

## 2024-02-09 RX ADMIN — FERROUS SULFATE TAB 325 MG (65 MG ELEMENTAL FE) 1 TABLET: 325 (65 FE) TAB at 08:38

## 2024-02-09 RX ADMIN — IPRATROPIUM BROMIDE AND ALBUTEROL SULFATE 3 ML: 2.5; .5 SOLUTION RESPIRATORY (INHALATION) at 17:14

## 2024-02-09 RX ADMIN — POLYETHYLENE GLYCOL 3350 17 G: 17 POWDER, FOR SOLUTION ORAL at 08:38

## 2024-02-09 RX ADMIN — SENNOSIDES 8.6 MG: 8.6 TABLET, FILM COATED ORAL at 21:59

## 2024-02-09 RX ADMIN — HEPARIN SODIUM 5000 UNITS: 5000 INJECTION INTRAVENOUS; SUBCUTANEOUS at 17:43

## 2024-02-09 RX ADMIN — ASPIRIN 81 MG: 81 TABLET, COATED ORAL at 22:00

## 2024-02-09 RX ADMIN — METOPROLOL SUCCINATE 25 MG: 25 TABLET, EXTENDED RELEASE ORAL at 08:37

## 2024-02-09 RX ADMIN — BUDESONIDE 0.5 MG: 0.5 INHALANT ORAL at 20:57

## 2024-02-09 RX ADMIN — CHOLECALCIFEROL TAB 125 MCG (5000 UNIT) 5000 UNITS: 125 TAB at 08:37

## 2024-02-09 RX ADMIN — BUDESONIDE 0.5 MG: 0.5 INHALANT ORAL at 09:56

## 2024-02-09 RX ADMIN — IPRATROPIUM BROMIDE AND ALBUTEROL SULFATE 3 ML: 2.5; .5 SOLUTION RESPIRATORY (INHALATION) at 20:57

## 2024-02-09 RX ADMIN — LACTULOSE 20 G: 20 SOLUTION ORAL at 08:38

## 2024-02-09 RX ADMIN — LACTULOSE 20 G: 20 SOLUTION ORAL at 21:55

## 2024-02-09 RX ADMIN — SACUBITRIL AND VALSARTAN 0.5 TABLET: 24; 26 TABLET, FILM COATED ORAL at 11:57

## 2024-02-09 RX ADMIN — SIMVASTATIN 40 MG: 40 TABLET, FILM COATED ORAL at 22:00

## 2024-02-09 RX ADMIN — HEPARIN SODIUM 5000 UNITS: 5000 INJECTION INTRAVENOUS; SUBCUTANEOUS at 08:38

## 2024-02-09 RX ADMIN — TAMSULOSIN HYDROCHLORIDE 0.4 MG: 0.4 CAPSULE ORAL at 08:38

## 2024-02-09 RX ADMIN — IPRATROPIUM BROMIDE AND ALBUTEROL SULFATE 3 ML: 2.5; .5 SOLUTION RESPIRATORY (INHALATION) at 14:29

## 2024-02-09 RX ADMIN — DAPAGLIFLOZIN 10 MG: 10 TABLET, FILM COATED ORAL at 08:37

## 2024-02-09 RX ADMIN — FAMOTIDINE 10 MG: 20 TABLET, FILM COATED ORAL at 21:56

## 2024-02-09 RX ADMIN — SACUBITRIL AND VALSARTAN 0.5 TABLET: 24; 26 TABLET, FILM COATED ORAL at 21:59

## 2024-02-09 RX ADMIN — TORSEMIDE 10 MG: 20 TABLET ORAL at 08:37

## 2024-02-09 ASSESSMENT — PAIN SCALES - GENERAL
PAINLEVEL_OUTOF10: 0 - NO PAIN

## 2024-02-09 ASSESSMENT — PAIN - FUNCTIONAL ASSESSMENT
PAIN_FUNCTIONAL_ASSESSMENT: 0-10

## 2024-02-09 NOTE — PROGRESS NOTES
Michael Sandra is a 89 y.o. male on day 8 of admission presenting with Acute on chronic diastolic heart failure (CMS/HCC).      Subjective   Patient seen and examined  Patient wore bipap HS overnight, currently on 3 lpm nasal cannula, 2 lpm nasal cannula is baseline  Denies any complaints, states he feels good  Was able to move his bowel yesterday      Objective     Last Recorded Vitals  /59 (BP Location: Left arm, Patient Position: Lying)   Pulse 72   Temp 36.7 °C (98.1 °F) (Temporal)   Resp 22   Wt 66.9 kg (147 lb 7.8 oz)   SpO2 96%   Intake/Output last 3 Shifts:    Intake/Output Summary (Last 24 hours) at 2/9/2024 1150  Last data filed at 2/9/2024 0442  Gross per 24 hour   Intake 540 ml   Output 1150 ml   Net -610 ml         Admission Weight  Weight: 69.4 kg (153 lb) (02/01/24 1253)    Daily Weight  02/09/24 : 66.9 kg (147 lb 7.8 oz)    Physical Exam  Gen: NAD, alert oriented, awake, pleasant cooperative  HEENT: MMM, EOMI, no scleral icterus  Neck: supple, no LAD  CV: irregularly irregular  Lungs: CTA upper lung fields, diminished at bases, no conversational dyspnea, on 3 lpm nasal cannula  Abd: soft, nontender, nondistended, normoactive BS  Ext: WWP, no lower extremity pitting edema  Skin: no rashes/lesions  Neuro: Cns III-XII grossly intact, alert, oriented     Scheduled medications  aspirin, 81 mg, oral, Nightly  budesonide, 0.5 mg, nebulization, BID  cholecalciferol, 5,000 Units, oral, Daily  dapagliflozin propanediol, 10 mg, oral, Daily  famotidine, 10 mg, oral, Nightly  ferrous sulfate (325 mg ferrous sulfate), 65 mg of iron, oral, Daily  formoterol, 20 mcg, nebulization, BID  heparin (porcine), 5,000 Units, subcutaneous, q8h  ipratropium-albuteroL, 3 mL, nebulization, 4x daily  lactulose, 20 g, oral, BID  metoprolol succinate XL, 25 mg, oral, Daily  polyethylene glycol, 17 g, oral, Daily  potassium chloride CR, 10 mEq, oral, Nightly  sacubitriL-valsartan, 0.5 tablet, oral, BID  sennosides, 1  tablet, oral, Nightly  simvastatin, 40 mg, oral, Nightly  tamsulosin, 0.4 mg, oral, Daily  torsemide, 10 mg, oral, Daily      Continuous medications       PRN medications  PRN medications: acetaminophen, albuterol, diclofenac sodium, oxygen, oxygen    Results for orders placed or performed during the hospital encounter of 02/01/24 (from the past 24 hour(s))   CBC   Result Value Ref Range    WBC 9.8 4.4 - 11.3 x10*3/uL    nRBC 0.0 0.0 - 0.0 /100 WBCs    RBC 2.87 (L) 4.50 - 5.90 x10*6/uL    Hemoglobin 8.2 (L) 13.5 - 17.5 g/dL    Hematocrit 27.7 (L) 41.0 - 52.0 %    MCV 97 80 - 100 fL    MCH 28.6 26.0 - 34.0 pg    MCHC 29.6 (L) 32.0 - 36.0 g/dL    RDW 13.3 11.5 - 14.5 %    Platelets 121 (L) 150 - 450 x10*3/uL   Basic Metabolic Panel   Result Value Ref Range    Glucose 111 (H) 74 - 99 mg/dL    Sodium 139 136 - 145 mmol/L    Potassium 5.1 3.5 - 5.3 mmol/L    Chloride 103 98 - 107 mmol/L    Bicarbonate 33 (H) 21 - 32 mmol/L    Anion Gap 8 (L) 10 - 20 mmol/L    Urea Nitrogen 54 (H) 6 - 23 mg/dL    Creatinine 1.73 (H) 0.50 - 1.30 mg/dL    eGFR 36 (L) >60 mL/min/1.73m*2    Calcium 8.2 (L) 8.6 - 10.3 mg/dL   Magnesium   Result Value Ref Range    Magnesium 2.36 1.60 - 2.40 mg/dL       XR chest 1 view    Result Date: 2/4/2024  Interpreted By:  Jamison Lagunas, STUDY: XR CHEST 1 VIEW;  2/3/2024 11:32 am   INDICATION: Signs/Symptoms:hypoxia.   COMPARISON: CT chest and chest radiograph 02/01/2024   ACCESSION NUMBER(S): [id number]   ORDERING CLINICIAN: EVELYNE CARRILLO   FINDINGS: AP radiograph of the chest was provided.   DEVICES: None.   CARDIOMEDIASTINAL SILHOUETTE: Cardiomediastinal silhouette is stable in size and configuration.Atherosclerotic calcifications of the aortic arch.   LUNGS: Bilateral low lung volumes. Interval worsening interstitial thickening and airspace opacities predominantly in the left mid to lower lung zones. Moderate left pleural effusion, also new since prior. No pneumothorax.   BONES: No acute osseous  changes.       1.  Interval worsening since 02/01/2024.     Signed by: Jamison Lagunas 2/4/2024 12:05 PM Dictation workstation:   [id number]         Assessment/Plan     Principal Problem:    Acute on chronic diastolic heart failure (CMS/HCC)  Active Problems:    Aortic stenosis    Dyspnea on exertion    Gastroesophageal reflux disease    Hard of hearing    Malignant neoplasm of prostate (CMS/HCC)    Chronic respiratory failure with hypoxia (CMS/HCC)    Essential hypertension    Acute on chronic congestive heart failure, unspecified heart failure type (CMS/HCC)  #acute systolic heart failure  #acute on chronic hypoxic respiratory failure  #severe aortic stenosis  #COPD    Plan:    -currently on 3 lpm nasal cannula, wears 2 lpm nasal cannula at baseline  -Cardiology following: Optimize medical management by continuing metoprolol succinate, Farxiga, with a plan for 0.5 tablet 24-26 Entresto BID today  -renal function improving  -cont ASA,cont statin, cont ICS, DIANE, LABA,-cont albuterol PRN  -hemoglobin 8.2,  post 1 uPRBC on hospitalization  -Misc: cont rest of home meds as ordered  -cardiac diet  -am labs including cbc, bmp, mag  -POC discussed with patient and attending at bedside  -dispo: following cardiology recommendations, likely ready for DC tomorrow, having Hospice information meeting today, SNF has been discussed with family over past three days, awaiting choice from family for DC planning  -PT OT, am pac 14, dc planning, likely need SNF, family aware and looking into choices  Dvtp: heparin    Code: full    ERIC [facility]  [address] [address]  Phone: [phone] Fax: [phone]

## 2024-02-09 NOTE — PROGRESS NOTES
"José Antonio Sandra \"Michael\" is a 89 y.o. male on day 8 of admission presenting with Acute on chronic diastolic heart failure (CMS/HCC).      Subjective   Patient was seen and examined today in the morning.  No acute events overnight.  Patient tolerated the BiPAP at night.         Objective     Last Recorded Vitals  /59 (BP Location: Left arm, Patient Position: Lying)   Pulse 72   Temp 36.7 °C (98.1 °F) (Temporal)   Resp 22   Wt 66.9 kg (147 lb 7.8 oz)   SpO2 96%   Intake/Output last 3 Shifts:    Intake/Output Summary (Last 24 hours) at 2/9/2024 1052  Last data filed at 2/9/2024 0442  Gross per 24 hour   Intake 540 ml   Output 1150 ml   Net -610 ml         Admission Weight  Weight: 69.4 kg (153 lb) (02/01/24 1253)    Daily Weight  02/09/24 : 66.9 kg (147 lb 7.8 oz)      Physical Exam:  General: ANO x 3, on 3 L nasal cannula, frail  HEENT: PERRLA, head intact and normocephalic  Neck: JVD  Lungs: Decreased air entry at the bases with respiratory crackles at the bases with no wheezes or rhonchi  Cardiac: Irregular rate with ejection systolic murmur and holosystolic murmur.  Abdomen: Soft nontender, positive bowel sounds  : Exam deferred  Skin: Intact  Musculoskeletal: No peripheral edema  Neurological: Alert awake oriented, no focal deficit, cranial nerves grossly intact  Psych: No suicidal ideation or homicidal ideation    Relevant Results  Scheduled medications  aspirin, 81 mg, oral, Nightly  budesonide, 0.5 mg, nebulization, BID  cholecalciferol, 5,000 Units, oral, Daily  dapagliflozin propanediol, 10 mg, oral, Daily  famotidine, 10 mg, oral, Nightly  ferrous sulfate (325 mg ferrous sulfate), 65 mg of iron, oral, Daily  formoterol, 20 mcg, nebulization, BID  heparin (porcine), 5,000 Units, subcutaneous, q8h  ipratropium-albuteroL, 3 mL, nebulization, 4x daily  lactulose, 20 g, oral, BID  metoprolol succinate XL, 25 mg, oral, Daily  polyethylene glycol, 17 g, oral, Daily  potassium chloride CR, 10 mEq, " oral, Nightly  sacubitriL-valsartan, 0.5 tablet, oral, BID  sennosides, 1 tablet, oral, Nightly  simvastatin, 40 mg, oral, Nightly  tamsulosin, 0.4 mg, oral, Daily  torsemide, 10 mg, oral, Daily      Continuous medications     PRN medications  PRN medications: acetaminophen, albuterol, diclofenac sodium, oxygen, oxygen     Results for orders placed or performed during the hospital encounter of 02/01/24 (from the past 24 hour(s))   CBC   Result Value Ref Range    WBC 9.8 4.4 - 11.3 x10*3/uL    nRBC 0.0 0.0 - 0.0 /100 WBCs    RBC 2.87 (L) 4.50 - 5.90 x10*6/uL    Hemoglobin 8.2 (L) 13.5 - 17.5 g/dL    Hematocrit 27.7 (L) 41.0 - 52.0 %    MCV 97 80 - 100 fL    MCH 28.6 26.0 - 34.0 pg    MCHC 29.6 (L) 32.0 - 36.0 g/dL    RDW 13.3 11.5 - 14.5 %    Platelets 121 (L) 150 - 450 x10*3/uL   Basic Metabolic Panel   Result Value Ref Range    Glucose 111 (H) 74 - 99 mg/dL    Sodium 139 136 - 145 mmol/L    Potassium 5.1 3.5 - 5.3 mmol/L    Chloride 103 98 - 107 mmol/L    Bicarbonate 33 (H) 21 - 32 mmol/L    Anion Gap 8 (L) 10 - 20 mmol/L    Urea Nitrogen 54 (H) 6 - 23 mg/dL    Creatinine 1.73 (H) 0.50 - 1.30 mg/dL    eGFR 36 (L) >60 mL/min/1.73m*2    Calcium 8.2 (L) 8.6 - 10.3 mg/dL   Magnesium   Result Value Ref Range    Magnesium 2.36 1.60 - 2.40 mg/dL             ECG 12 Lead    Result Date: 2/5/2024  Sinus rhythm with Premature atrial complexes Voltage criteria for left ventricular hypertrophy Nonspecific T wave abnormality Abnormal ECG When compared with ECG of 01-FEB-2024 14:23, T wave amplitude has decreased in Lateral leads    XR chest 1 view    Result Date: 2/4/2024  Interpreted By:  Jamison Lagunas, STUDY: XR CHEST 1 VIEW;  2/3/2024 11:32 am   INDICATION: Signs/Symptoms:hypoxia.   COMPARISON: CT chest and chest radiograph 02/01/2024   ACCESSION NUMBER(S): [id number]   ORDERING CLINICIAN: EVELYNE CARRILLO   FINDINGS: AP radiograph of the chest was provided.   DEVICES: None.   CARDIOMEDIASTINAL SILHOUETTE: Cardiomediastinal  silhouette is stable in size and configuration.Atherosclerotic calcifications of the aortic arch.   LUNGS: Bilateral low lung volumes. Interval worsening interstitial thickening and airspace opacities predominantly in the left mid to lower lung zones. Moderate left pleural effusion, also new since prior. No pneumothorax.   BONES: No acute osseous changes.       1.  Interval worsening since 02/01/2024.     Signed by: Jamison Lagunas 2/4/2024 12:05 PM Dictation workstation:   [id number]    Electrocardiogram, 12-lead PRN ACS symptoms    Result Date: 2/3/2024  Sinus rhythm with occasional nonconducted premature atrial complexes Moderate voltage criteria for LVH, may be normal variant Late transition Borderline ECG When compared with ECG of 24-DEC-2023 15:58, (unconfirmed) Sinus rhythm has replaced Atrial fibrillation Non-specific change in ST segment in Lateral leads Confirmed by Michael Sharma ([id number]) on 2/3/2024 10:51:26 AM    ECG 12 lead    Result Date: 2/2/2024  Sinus rhythm with Premature atrial complexes Moderate voltage criteria for LVH, may be normal variant Borderline ECG When compared with ECG of 01-FEB-2024 12:49, (unconfirmed) Premature atrial complexes are now Present Confirmed by Avtar Cm ([id number]) on 2/2/2024 1:26:52 PM    ECG 12 lead    Result Date: 2/2/2024  Sinus rhythm with marked sinus arrhythmia Moderate voltage criteria for LVH, may be normal variant Borderline ECG When compared with ECG of 24-DEC-2023 15:58, (unconfirmed) Sinus rhythm has replaced Atrial fibrillation Non-specific change in ST segment in Lateral leads Confirmed by Avtar Cm ([id number]) on 2/2/2024 1:25:38 PM    Transthoracic Echo (TTE) Limited    Result Date: 2/2/2024            [facility] [place] [id number] [facility] [id number]    Tel [phone] Fax [phone] TRANSTHORACIC ECHOCARDIOGRAM REPORT  Patient Name:      MICHELLE TORERS    Reading Physician:   [id number] Ricks                                                              MD Study Date:        2/2/2024            Ordering Provider:   [id number] TIFFANY CHOETI MRN/PID:           [id number]            Fellow: Accession#:        [id number]        Nurse: Date of Birth/Age: 1/10/1931 / 89      Sonographer:         Ana Laura Dalton RDCS                    years Gender:            M                   Additional Staff: Height:            167.64 cm           Admit Date:          2/1/2024 Weight:            72.12 kg            Admission Status:    Inpatient - Routine BSA:               1.81 m2             Department Location: [facility] Echo Lab Blood Pressure: 131 /69 mmHg Study Type:    TRANSTHORACIC ECHO (TTE) LIMITED Diagnosis/ICD: Chest pain, unspecified-R07.9; Acute on chronic diastolic                (congestive) heart failure (CHF)-I50.33 Indication:    CP, CHF CPT Codes:     Echo Limited-29674  Study Detail: The following Echo studies were performed: 2D, M-Mode, Doppler and               color flow.  PHYSICIAN INTERPRETATION: Left Ventricle: Left ventricular systolic function is moderately decreased, with an estimated ejection fraction of 35-40%. There is global hypokinesis of the left ventricle with minor regional variations. The left ventricular cavity size is normal. Spectral Doppler shows a pseudonormal pattern of left ventricular diastolic filling. Left Atrium: The left atrium was not assessed. Right Ventricle: The right ventricle is normal in size. There is normal right ventricular global systolic function. Right Atrium: The right atrium was not assessed. Aortic Valve: The aortic valve is probably trileaflet. There is moderate aortic valve cusp calcification. There is moderate aortic valve thickening. There is evidence of severe aortic valve stenosis. There is trivial aortic valve regurgitation. Probably severe AS. Recommend additional measurements if clinically indicated. Mitral Valve: The mitral valve is normal in structure. There is moderate mitral valve regurgitation.  Tricuspid Valve: The tricuspid valve is structurally normal. There is moderate tricuspid regurgitation. The Doppler estimated RVSP is severely elevated at 66.0 mmHg. Pulmonic Valve: The pulmonic valve is structurally normal. There is no indication of pulmonic valve regurgitation. Pericardium: There is no pericardial effusion noted. Aorta: The aortic root was not assessed. Systemic Veins: The inferior vena cava appears moderately dilated. In comparison to the previous echocardiogram(s): Prior examinations are available and were reviewed for comparison purposes. Compared to 10/2023 study the LVEF is worse.  CONCLUSIONS:  1. Left ventricular systolic function is moderately decreased with a 35-40% estimated ejection fraction.  2. Spectral Doppler shows a pseudonormal pattern of left ventricular diastolic filling.  3. Moderate mitral valve regurgitation.  4. Moderate tricuspid regurgitation.  5. Severely elevated right ventricular systolic pressure.  6. Probably severe AS. Recommend additional measurements if clinically indicated.  7. Severe aortic valve stenosis.  8. There is moderate aortic valve cusp calcification.  9. The inferior vena cava appears moderately dilated. 10. Compared to 10/2023 study the LVEF is worse. 11. There is global hypokinesis of the left ventricle with minor regional variations. QUANTITATIVE DATA SUMMARY: 2D MEASUREMENTS:                           Normal Ranges: IVSd:          1.10 cm    (0.6-1.1cm) LVPWd:         1.00 cm    (0.6-1.1cm) LVIDd:         5.10 cm    (3.9-5.9cm) LVIDs:         4.00 cm LV Mass Index: 110.7 g/m2 LV % FS        21.6 % LV SYSTOLIC FUNCTION BY 2D PLANIMETRY (MOD):                     Normal Ranges: EF-A4C View: 38.0 % (>=55%) EF-A2C View: 38.9 % EF-Biplane:  38.9 % LV DIASTOLIC FUNCTION:                        Normal Ranges: MV Peak E:    1.46 m/s (0.7-1.2 m/s) MV Peak A:    1.12 m/s (0.42-0.7 m/s) E/A Ratio:    1.30     (1.0-2.2) MV e'         0.05 m/s (>8.0) MV lateral  e' 0.06 m/s MV medial e'  0.05 m/s E/e' Ratio:   27.37    (<8.0) MITRAL VALVE:                 Normal Ranges: MV DT: 174 msec (150-240msec) AORTIC VALVE:                         Normal Ranges: LVOT Max Wilner:  0.86 m/s (<=1.1m/s) LVOT VTI:      18.70 cm LVOT Diameter: 2.10 cm  (1.8-2.4cm)  RIGHT VENTRICLE: TAPSE: 20.3 mm RV s'  0.08 m/s TRICUSPID VALVE/RVSP:                             Normal Ranges: Peak TR Velocity: 3.97 m/s RV Syst Pressure: 66.0 mmHg (< 30mmHg)  [id number] Armando Ricks MD Electronically signed on 2/2/2024 at 10:28:08 AM  ** Final **     CT chest abdomen pelvis wo IV contrast    Result Date: 2/1/2024  Interpreted By:  Noa Kwon, STUDY: CT CHEST ABDOMEN PELVIS WO CONTRAST;  2/1/2024 5:02 pm   INDICATION: Signs/Symptoms:chest pain, c/f free air on CXR.   COMPARISON: Chest x-ray performed on this day.   ACCESSION NUMBER(S): [id number]   ORDERING CLINICIAN: MICKEY HONEYCUTT   TECHNIQUE: CT of the chest, abdomen and pelvis was performed. Contiguous axial images were obtained at 3 mm slice thickness through the chest, abdomen and pelvis. Coronal and sagittal reconstructions at 3 mm slice thickness were performed.  No intravenous contrast was administered; positive oral contrast was given.   FINDINGS: Please note that the study is limited without intravenous contrast. The scan is also limited by artifacts arising from the patient's arms that are kept by his sides.   CHEST:   LUNG/PLEURA/LARGE AIRWAYS: Tracheobronchial tree this patent.  There are infiltrate/atelectasis in the posterior lower lobes. No pleural effusion. No suspicious pulmonary nodules..   VESSELS: Mild dilatation of the ascending aorta measured at 4.3 cm. Pulmonary arteries normal in caliber. Dense three-vessel coronary arterial calcifications. The scan is not optimized for coronary calcium scoring.   HEART: Heart is moderately enlarged. No pericardial effusion.   MEDIASTINUM AND GLORIA: No mediastinal lymphadenopathy.  No large hilar lymph nodes on  this nonenhanced scan. Esophagus is unremarkable   CHEST WALL AND LOWER NECK: Severe thoracic kyphosis.. Thyroid is not clearly visualized.   ABDOMEN:   LIVER: Unremarkable.   BILE DUCTS: Unremarkable.   GALLBLADDER: Tiny gallstones but with no signs of acute cholecystitis..   PANCREAS: Atrophic pancreas.   SPLEEN: Unremarkable.   ADRENAL GLANDS: Unremarkable.   KIDNEYS AND URETERS: Normal in size. Multiple bilateral cysts measured up to 7 cm. No hydronephrosis. No urinary tract calculi or obstruction.   PELVIS:   BLADDER: Unremarkable.   REPRODUCTIVE ORGANS: Brachytherapy seeds in the prostate. No pelvic masses.   BOWEL: Colon interposition anteriorly to liver which is responsible for the questionable free air on the recent chest radiograph. Stomach and intestine are otherwise unremarkable. No intestinal wall thickening. Appendix is identified..   VESSELS: Arteriosclerotic changes. Abdominal aorta and IVC are normal in caliber.   PERITONEUM/RETROPERITONEUM/LYMPH NODES: No evidence of lymphadenopathy. Retroperitoneal is unremarkable. No free fluid or free air.   ABDOMINAL WALL: A small right inguinal hernia which contains fat and fluid.   BONES: Unremarkable. Grade 1 anterolisthesis of C4-C5. No suspicious bone lesions.       Chest 1. Infiltrate/atelectasis in the posterior lower lobes. Rule out pneumonia. No pleural effusion. 2. Mild dilatation of the ascending aorta measured at 4.3 cm in diameter. 3. Exaggerated thoracic kyphosis. 4. Cardiomegaly.   Abdomen-Pelvis 1.  No evidence of free air. Colon interposition anteriorly to the liver which corresponds to the findings noted on the recent chest radiograph. 2. Multiple renal cysts. No hydronephrosis. 3. Tiny gallstones but with no signs of acute cholecystitis 4. A small right inguinal hernia containing fat and fluid. 5. Brachytherapy seeds in the prostate.     Signed by: Noa Kwon 2/1/2024 5:59 PM Dictation workstation:   [id number]    XR chest 1 view    Result  "Date: 2/1/2024  Interpreted By:  Eze Ashley, STUDY: XR CHEST 1 VIEW;  2/1/2024 2:03 pm   INDICATION: Signs/Symptoms:Chest Pain.   COMPARISON: 12/24/2023   ACCESSION NUMBER(S): [id number]   ORDERING CLINICIAN: STEFANIA MACKEY   FINDINGS: Low lung volumes with vascular crowding. Borderline heart size. Atherosclerosis. Diffuse interstitial pulmonary opacities suggestive of pulmonary edema. Questionable small left pleural effusion. Gas seen deep to the right hemidiaphragm, free air versus bowel gas.       Gas deep to the right hemidiaphragm, free air versus bowel gas. Consider follow-up with decubitus radiographs and/or CT of the abdomen and pelvis as warranted.   Cardiomegaly and pulmonary edema.   Eze Ashley discussed the significance and urgency of this critical finding by telephone with  STEFANIA MACKEY on 2/1/2024 at 2:28 pm.  (**-RCF-**) Findings:  See findings.     Signed by: Eze Ashley 2/1/2024 2:29 PM Dictation workstation:   [id number]       José Antonio Sandra \"Michael\" is a 89 y.o. male on day 8 of admission presenting with Acute on chronic diastolic heart failure (CMS/HCC).  Assessment/Plan   This patient currently has cardiac telemetry ordered; if you would like to modify or discontinue the telemetry order, click here to go to the orders activity to modify/discontinue the order.  Principal Problem:    Acute on chronic diastolic heart failure (CMS/HCC)  Active Problems:    Aortic stenosis    Dyspnea on exertion    Gastroesophageal reflux disease    Hard of hearing    Malignant neoplasm of prostate (CMS/HCC)    Chronic respiratory failure with hypoxia (CMS/HCC)    Essential hypertension    Acute on chronic congestive heart failure, unspecified heart failure type (CMS/HCC)  Acute on chronic systolic/diastolic heart failure   Severe aortic stenosis  New HFrEF with EF 35 to 40%  Type II MI  Troponinemia secondary to demand ischemia  Pulmonary hypertension  Left sided pleural effusion "   Plan:  The patient's volume status is at baseline with the current use of 3 L NC.  Kidney function has shown slight un tending 1.70 to 1.73  Agreement with the primary team to continue diuresis.  Optimize medical management by continuing metoprolol succinate, Farxiga. Half a dose of Entresto twice daily started.   Awaiting evaluation by the structural heart team for further assessment.   The Family has agreed to have a informational meeting with [facility] to discuss future needs.   Outpatient follow up with Dr. Mauro's office in 2-4 weeks.    Thank you for the opportunity to participate in the care of your patient.  Please do not hesitate to call if you have any questions.    Assessment and plan discussed with my attending.   Aneesh Kyle MD   Internal Medicine, PGY-1 .

## 2024-02-09 NOTE — CARE PLAN
Referral placed to [facility] family considering rehab for pt. [facility] has accepted pt, and he can discharge anytime.     Pt/family to meet bedside with [facility] today at 130p for informational Hospice meeting.     2p  Family met with [facility] RN, meeting was informational only, consents not signed. Plan for discharge to SNF [facility] with outpatient Palliative Care following.  [facility] can accept once pt medically ready. No additional Palliative Care needs at this time, we will sign off.  Please re-consult should situation change.

## 2024-02-09 NOTE — CARE PLAN
The clinical goals for the shift include patient will maintain pulse ox >92% this shift  The clinical goals for the shift include paitent will maintain pulse ox >92% this shift  S: Patient admitted 2/1/24 for acute on chronic heart failure .  B: History of CHF, aortic stenosis, COPD.  A: patient A+Ox 4, denies pain, denies SOB this shift, tolerates bipap well, able to wear it all night. Pulse ox remains >92%, afebrile, patient has referral for hospice, HR and BP remain WNL.  R: Patient from home unsure of discharge plan at this time.

## 2024-02-09 NOTE — NURSING NOTE
[address]  Met with patient, spouse, children, and family at bedside to discuss hospice services, philosophy, and plan of care. In addition, reviewed [facility] palliative care program.   At this time, family would like to pursue skilled therapy at [facility] home and have a referral place for palliative care and then bring on hospice once ready. Provided information booklet and contact number.     [facility] to place referral for [facility]. No further follow up at this time.    Update provided to:  Dr Dion Bruce, CNP  Bedside RN Lsisy Rothman, [facility] RN  [phone]

## 2024-02-09 NOTE — PROGRESS NOTES
"Michael Sandra is a 89 y.o. male on day 8 of admission presenting with Acute on chronic diastolic heart failure (CMS/HCC).    Subjective   Symptoms (0 - 10, Best to Worst)  [facility] Symptom Assessment System  Pain Score: 0 - No pain         Objective     Physical Exam    Last Recorded Vitals  Blood pressure 107/59, pulse 72, temperature 36.7 °C (98.1 °F), temperature source Temporal, resp. rate 22, height 1.676 m (5' 6\"), weight 66.9 kg (147 lb 7.8 oz), SpO2 96 %.  Intake/Output last 3 Shifts:  I/O last 3 completed shifts:  In: 900 (13.5 mL/kg) [P.O.:900]  Out: 1800 (26.9 mL/kg) [Urine:1800 (0.7 mL/kg/hr)]  Weight: 66.9 kg               Patient/proxy preference for information  Prefers full information    Goals of Care  Skilled care for PT/OT. With plan to discharge to home after skilled care.     Is the patient hospice-eligible?   YesProvider estimate of survival: 1-6 months  Was a discussion held re hospice services?   yes  Was a decision made re hospice services?    Decision made for a informational meeting with [facility] to discuss future needs . At this time pt desires to go to SNF and is able/willing to participate in therapy.     Other Palliative Support  Caregiver   Information for spouse and family.              Meera Mccoy, APRN-CNS      "

## 2024-02-10 VITALS
DIASTOLIC BLOOD PRESSURE: 59 MMHG | OXYGEN SATURATION: 96 % | WEIGHT: 151.68 LBS | RESPIRATION RATE: 31 BRPM | BODY MASS INDEX: 24.38 KG/M2 | TEMPERATURE: 99 F | HEIGHT: 66 IN | SYSTOLIC BLOOD PRESSURE: 106 MMHG | HEART RATE: 80 BPM

## 2024-02-10 DIAGNOSIS — R35.0 BENIGN PROSTATIC HYPERPLASIA WITH URINARY FREQUENCY: Primary | ICD-10-CM

## 2024-02-10 DIAGNOSIS — I10 PRIMARY HYPERTENSION: ICD-10-CM

## 2024-02-10 DIAGNOSIS — E78.5 DYSLIPIDEMIA: ICD-10-CM

## 2024-02-10 DIAGNOSIS — N40.1 BENIGN PROSTATIC HYPERPLASIA WITH URINARY FREQUENCY: Primary | ICD-10-CM

## 2024-02-10 LAB
GLUCOSE BLD MANUAL STRIP-MCNC: 107 MG/DL (ref 74–99)
GLUCOSE BLD MANUAL STRIP-MCNC: 212 MG/DL (ref 74–99)

## 2024-02-10 PROCEDURE — 94640 AIRWAY INHALATION TREATMENT: CPT

## 2024-02-10 PROCEDURE — 94660 CPAP INITIATION&MGMT: CPT

## 2024-02-10 PROCEDURE — 99239 HOSP IP/OBS DSCHRG MGMT >30: CPT | Performed by: INTERNAL MEDICINE

## 2024-02-10 PROCEDURE — 2500000004 HC RX 250 GENERAL PHARMACY W/ HCPCS (ALT 636 FOR OP/ED): Performed by: STUDENT IN AN ORGANIZED HEALTH CARE EDUCATION/TRAINING PROGRAM

## 2024-02-10 PROCEDURE — 2500000004 HC RX 250 GENERAL PHARMACY W/ HCPCS (ALT 636 FOR OP/ED)

## 2024-02-10 PROCEDURE — 2500000005 HC RX 250 GENERAL PHARMACY W/O HCPCS: Performed by: INTERNAL MEDICINE

## 2024-02-10 PROCEDURE — 82947 ASSAY GLUCOSE BLOOD QUANT: CPT

## 2024-02-10 PROCEDURE — 2500000002 HC RX 250 W HCPCS SELF ADMINISTERED DRUGS (ALT 637 FOR MEDICARE OP, ALT 636 FOR OP/ED): Performed by: STUDENT IN AN ORGANIZED HEALTH CARE EDUCATION/TRAINING PROGRAM

## 2024-02-10 PROCEDURE — 2500000001 HC RX 250 WO HCPCS SELF ADMINISTERED DRUGS (ALT 637 FOR MEDICARE OP)

## 2024-02-10 PROCEDURE — 2500000001 HC RX 250 WO HCPCS SELF ADMINISTERED DRUGS (ALT 637 FOR MEDICARE OP): Performed by: STUDENT IN AN ORGANIZED HEALTH CARE EDUCATION/TRAINING PROGRAM

## 2024-02-10 PROCEDURE — 2500000001 HC RX 250 WO HCPCS SELF ADMINISTERED DRUGS (ALT 637 FOR MEDICARE OP): Performed by: NURSE PRACTITIONER

## 2024-02-10 PROCEDURE — 2500000001 HC RX 250 WO HCPCS SELF ADMINISTERED DRUGS (ALT 637 FOR MEDICARE OP): Performed by: INTERNAL MEDICINE

## 2024-02-10 RX ORDER — DAPAGLIFLOZIN 10 MG/1
10 TABLET, FILM COATED ORAL DAILY
Qty: 30 TABLET | Refills: 0
Start: 2024-02-10 | End: 2024-02-12

## 2024-02-10 RX ORDER — LIDOCAINE 560 MG/1
1 PATCH PERCUTANEOUS; TOPICAL; TRANSDERMAL DAILY
Refills: 0
Start: 2024-02-10 | End: 2024-02-12

## 2024-02-10 RX ORDER — DICLOFENAC SODIUM 10 MG/G
4 GEL TOPICAL 4 TIMES DAILY PRN
Start: 2024-02-10 | End: 2024-02-12

## 2024-02-10 RX ORDER — POTASSIUM CHLORIDE 750 MG/1
10 TABLET, FILM COATED, EXTENDED RELEASE ORAL NIGHTLY
Qty: 30 TABLET | Refills: 0
Start: 2024-02-10 | End: 2024-02-12

## 2024-02-10 RX ORDER — LIDOCAINE 560 MG/1
1 PATCH PERCUTANEOUS; TOPICAL; TRANSDERMAL DAILY
Status: DISCONTINUED | OUTPATIENT
Start: 2024-02-10 | End: 2024-02-10 | Stop reason: HOSPADM

## 2024-02-10 RX ORDER — FAMOTIDINE 10 MG/1
10 TABLET ORAL NIGHTLY
Qty: 30 TABLET | Refills: 0
Start: 2024-02-10 | End: 2024-02-16

## 2024-02-10 RX ADMIN — POLYETHYLENE GLYCOL 3350 17 G: 17 POWDER, FOR SOLUTION ORAL at 09:29

## 2024-02-10 RX ADMIN — BUDESONIDE 0.5 MG: 0.5 INHALANT ORAL at 08:50

## 2024-02-10 RX ADMIN — FERROUS SULFATE TAB 325 MG (65 MG ELEMENTAL FE) 1 TABLET: 325 (65 FE) TAB at 09:23

## 2024-02-10 RX ADMIN — HEPARIN SODIUM 5000 UNITS: 5000 INJECTION INTRAVENOUS; SUBCUTANEOUS at 01:15

## 2024-02-10 RX ADMIN — CHOLECALCIFEROL TAB 125 MCG (5000 UNIT) 5000 UNITS: 125 TAB at 11:00

## 2024-02-10 RX ADMIN — DAPAGLIFLOZIN 10 MG: 10 TABLET, FILM COATED ORAL at 09:23

## 2024-02-10 RX ADMIN — LIDOCAINE 1 PATCH: 4 PATCH TOPICAL at 10:59

## 2024-02-10 RX ADMIN — TAMSULOSIN HYDROCHLORIDE 0.4 MG: 0.4 CAPSULE ORAL at 09:24

## 2024-02-10 RX ADMIN — IPRATROPIUM BROMIDE AND ALBUTEROL SULFATE 3 ML: 2.5; .5 SOLUTION RESPIRATORY (INHALATION) at 08:50

## 2024-02-10 RX ADMIN — IPRATROPIUM BROMIDE AND ALBUTEROL SULFATE 3 ML: 2.5; .5 SOLUTION RESPIRATORY (INHALATION) at 13:26

## 2024-02-10 RX ADMIN — Medication 4 L/MIN: at 08:51

## 2024-02-10 RX ADMIN — LACTULOSE 20 G: 20 SOLUTION ORAL at 09:25

## 2024-02-10 RX ADMIN — METOPROLOL SUCCINATE 25 MG: 25 TABLET, EXTENDED RELEASE ORAL at 11:01

## 2024-02-10 RX ADMIN — SACUBITRIL AND VALSARTAN 0.5 TABLET: 24; 26 TABLET, FILM COATED ORAL at 09:24

## 2024-02-10 RX ADMIN — TORSEMIDE 10 MG: 20 TABLET ORAL at 09:25

## 2024-02-10 RX ADMIN — FORMOTEROL FUMARATE 20 MCG: 20 SOLUTION RESPIRATORY (INHALATION) at 08:50

## 2024-02-10 ASSESSMENT — PAIN SCALES - GENERAL
PAINLEVEL_OUTOF10: 0 - NO PAIN

## 2024-02-10 ASSESSMENT — COGNITIVE AND FUNCTIONAL STATUS - GENERAL
MOVING FROM LYING ON BACK TO SITTING ON SIDE OF FLAT BED WITH BEDRAILS: A LOT
MOVING TO AND FROM BED TO CHAIR: A LOT
TURNING FROM BACK TO SIDE WHILE IN FLAT BAD: A LOT
HELP NEEDED FOR BATHING: A LOT
DAILY ACTIVITIY SCORE: 13
STANDING UP FROM CHAIR USING ARMS: A LOT
DRESSING REGULAR UPPER BODY CLOTHING: A LOT
TOILETING: A LOT
EATING MEALS: A LITTLE
CLIMB 3 TO 5 STEPS WITH RAILING: TOTAL
WALKING IN HOSPITAL ROOM: A LOT
PERSONAL GROOMING: A LITTLE
MOBILITY SCORE: 11
DRESSING REGULAR LOWER BODY CLOTHING: TOTAL

## 2024-02-10 ASSESSMENT — PAIN - FUNCTIONAL ASSESSMENT
PAIN_FUNCTIONAL_ASSESSMENT: 0-10

## 2024-02-10 NOTE — DISCHARGE SUMMARY
"Discharge Diagnosis  Acute on chronic diastolic heart failure (CMS/LTAC, located within [facility])    Issues Requiring Follow-Up  Follow up with PCP within 1 week of discharge  Follow up with Cardiology within 2 weeks of discharge       Test Results Pending At Discharge  Pending Labs       No current pending labs.            Hospital Course  89 year old man with known past medical history of HFpEF 50 to 55% October 2023, COPD on 2 L via NC, prostate cancer, moderate to severe aortic stenosis presents to the emergency department with his son with concern for chest pain that developed while watching television (at rest), left-sided radiating to back. Endorsed compliance with his medications including torsemide 10 mg daily, despite having worsening edema develop; chest pain is worse with deep inspiration and positional changes.  He does report prodromal \"cold\" symptoms that resolved about 3 to 4 days prior to admission.  He denies associated nausea, vomiting, headache, blurry vision, double vision, abdominal pain, diarrhea, constipation, urinary symptoms.     During the patient's hospitalization, he was optimized medically by continuing metoprolol succinate and farxiga with addition of Entresto; his hospital course was complicated by anemia that resolved after a unit of packed red blood cells transfusion; this all was done in conjunction with cardiology consultation, the patient will follow-up with their office in 2 to 4 weeks after discharge, he will follow-up with his primary care physician within 1 week of discharge from the hospital; during the hospitalization he met with hospice of the [facility], although ultimately decided to be discharged to [facility] for SNF and being enrolled in the palliative care navigator program.    Greater than 30 minutes was spent facilitating this patients discharge from the hospital which included examining the patient, reconciling medications, and making arrangements for future care.  Dion Mauro, " MD  [facility]  Internal Medicine    This document was generated in whole or in part using the Dragon One medical voice recognition software and there may be some incorrect words/wording, spelling, or punctuation errors that were not corrected prior to finalization in the medical record.    Pertinent Physical Exam At Time of Discharge  Physical Exam  Gen: NAD, alert oriented, awake, pleasant cooperative  HEENT: MMM, EOMI, no scleral icterus  Neck: supple, no LAD  CV: irregularly irregular  Lungs: CTA upper lung fields, diminished at bases, no conversational dyspnea, on 3 lpm nasal cannula  Abd: soft, nontender, nondistended, normoactive BS  Ext: WWP, no lower extremity pitting edema  Skin: no rashes/lesions    Home Medications     Medication List      ASK your doctor about these medications     albuterol 90 mcg/actuation inhaler; Inhale 2 puffs every 4 hours if   needed for wheezing or shortness of breath.   amLODIPine 10 mg tablet; Commonly known as: Norvasc   aspirin 81 mg EC tablet   Breo Ellipta 200-25 mcg/dose inhaler; Generic drug: fluticasone   furoate-vilanteroL; USE 1 INHALATION BY MOUTH ONCE  DAILY   calcium carbonate 500 mg calcium (1,250 mg) tablet; Commonly known as:   Oscal   cholecalciferol 5,000 Units tablet; Commonly known as: Vitamin D-3   famotidine 20 mg tablet; Commonly known as: Pepcid; TAKE 1 TABLET BY   MOUTH DAILY AS  DIRECTED   ferrous sulfate 325 (65 Fe) MG EC tablet; Take 1 tablet by mouth once   daily.   losartan 50 mg tablet; Commonly known as: Cozaar; TAKE 1 TABLET BY MOUTH   DAILY AS  DIRECTED   oxygen gas therapy; Commonly known as: O2   potassium chloride CR 10 mEq ER tablet; Commonly known as: Klor-Con;   TAKE 1 TABLET BY MOUTH DAILY   PRESERVISION AREDS-2 ORAL   Prolia 60 mg/mL syringe; Generic drug: denosumab   simvastatin 40 mg tablet; Commonly known as: Zocor   Spiriva Respimat 2.5 mcg/actuation inhaler; Generic drug: tiotropium;   INHALE 2 INHALATIONS BY MOUTH   DAILY   tamsulosin 0.4 mg 24 hr capsule; Commonly known as: Flomax   torsemide 10 mg tablet; Commonly known as: Demadex; TAKE 1 TABLET BY   MOUTH DAILY       Outpatient Follow-Up  Future Appointments   Date Time Provider Department [place]   2/16/2024  1:10 PM Bernarda Wilburn DPM [id number] [place]   3/20/2024  1:00 PM Saul Mauro MD [id number] [place]   4/16/2024 11:00 AM Brii Martin MD [id number] [place]   4/18/2024 11:20 AM Kentrell Russell MD [id number] [place]   4/29/2024  2:30 PM Nseha Noguera MD [id number] [place]   5/4/2024 12:00 PM INF 01 [place] [id number] [place]   10/22/2024  1:30 PM Kentrell Russell MD [id number] [place]       Dion Mauro MD

## 2024-02-10 NOTE — CARE PLAN
The patient's goals for the shift include  HAVE NO SOB TONITE  THe clinical goals for the shift include paitent will maintain pulse ox >92% this shift

## 2024-02-10 NOTE — NURSING NOTE
THE PT HAD AN UNEVENTFUL NIGHT. WORE 3 L NC PRIOR TO MN THEN WORE HIS BIPAP 14/8 60% O2 FOR 7+ HRS WHILE HE SLEPT. O2 SATS WERE MAINTAINED ABOVE 92%. THE PT DENIED HAVING ANY CP, DIZZINESS, PALPITATIONS , NAUSEA OR FEELING SOB. RESP ARE TACHYPNEAC IN THE 30'S AND HE HAS DIMINISHED, SHALLOW BS. EKG- SINUS ARRYTHMIA WITH OCC PVCS, PACS. VSS.  TEMP MAXED AT 38.2 BUT BECAUSE HE WAS BUNDLED IN MANY MANY BLANKETS FOR WARMTH. SPONT CAME DOWN TO 36.7 THIS AM WITH TAKING SOME OF THE BLANKETS OFF. THE PT DENIES ANY CHILLS OR MUSCLE ACHES. HAS GOOD U/O PER THE [facility] EXT CATHETER. TRENDED DOWN BY THIS  AM TO A DK MARIE BROWN COLOR SINCE HE TOOK MINIMAL PO FLUIDS  OVERNIGHT WHILE ON THE BIPAP. NO SIGNS OF ASPIRATION OBSERVED. APPEARS STIFF AND WEAK TO MOVE DURING THE NIGHTTIME. IS VERY [place] AND DOES NOT ALWAYS HEAR THE EXAMINER TO RESPOND TO A QUESTION. KIND OF IGNORED AT TIMES.  SPONT LAFLEUR X4.  THE PT WAS REPOSITIONED Q 2HRS TO MAINTAIN HIS SKIN INTEGRITY. AND BATHED LAST PM. HIS SKIN WAS LOTIONED AND SKIN BARRIER CREAM WAS APPLIED. HE MIGHT BE POSS. BE DISCHARGED TO THE [facility] HOME TODAY, S.W. WORKING ON HIS PRECERT AND AWAITING ON THE OK FROM HIS  MD. NO DISTRESS OBSERVED THIS SHIFT. PT SAFETY WAS MAINTAINED.

## 2024-02-10 NOTE — CARE PLAN
Problem: Chronic Conditions and Co-morbidities  Goal: Patient's chronic conditions and co-morbidity symptoms are monitored and maintained or improved  Outcome: Adequate for Discharge     Problem: Pain  Goal: Walks with improved pain control throughout the shift  Outcome: Adequate for Discharge  Goal: Performs ADL's with improved pain control throughout shift  Outcome: Adequate for Discharge  Goal: Participates in PT with improved pain control throughout the shift  Outcome: Adequate for Discharge  Goal: Free from opioid side effects throughout the shift  Outcome: Adequate for Discharge  Goal: Free from acute confusion related to pain meds throughout the shift  Outcome: Adequate for Discharge     Problem: Dressings Lower Extremities  Goal: STG - Patient to complete lower body dressing MOD I  Outcome: Adequate for Discharge     Problem: Grooming  Goal: STG - Patient completes grooming MOD I  Outcome: Adequate for Discharge  Goal: STG - Patient will tolerate standing 4-8 min  Outcome: Adequate for Discharge     Problem: Nutrition  Goal: Oral intake greater 75%  Outcome: Adequate for Discharge  Goal: Electrolytes WNL  Outcome: Adequate for Discharge  Goal: Reduce weight from edema/fluid  Outcome: Adequate for Discharge     Problem: Skin  Goal: Decreased wound size/increased tissue granulation at next dressing change  Outcome: Adequate for Discharge     Problem: Discharge Barriers  Goal: My discharge needs are met  Outcome: Adequate for Discharge     Problem: Respiratory  Goal: Clear secretions with interventions this shift  Outcome: Adequate for Discharge  Goal: Minimize anxiety/maximize coping throughout shift  Outcome: Adequate for Discharge  Goal: Minimal/no exertional discomfort or dyspnea this shift  Outcome: Adequate for Discharge  Goal: No signs of respiratory distress (eg. Use of accessory muscles. Peds grunting)  Outcome: Adequate for Discharge  Goal: Patent airway maintained this shift  Outcome: Adequate for  Discharge  Goal: Tolerate mechanical ventilation evidenced by VS/agitation level this shift  Outcome: Adequate for Discharge  Goal: Tolerate pulmonary toileting this shift  Outcome: Adequate for Discharge  Goal: Verbalize decreased shortness of breath this shift  Outcome: Adequate for Discharge  Goal: Wean oxygen to maintain O2 saturation per order/standard this shift  Outcome: Adequate for Discharge  Goal: Increase self care and/or family involvement in next 24 hours  Outcome: Adequate for Discharge     Problem: Not Adhering To Sodium Restrictions  Goal: 2000mg or less of sodium per day over the next 30 days  Outcome: Adequate for Discharge     Problem: Not Adhering To Fluid Restrictions  Goal: Patient will verbalize understanding of fluid restrictions  Outcome: Adequate for Discharge     Problem: Not Completing Daily Weights  Goal: Patient will do weights every morning and call doctor for an increase of +3lbs overnight/+5Ibs over a week over next 30 days  Outcome: Adequate for Discharge     Problem: Not Taking Heart Failure Medication  Goal: Patient will verbalize understanding of all prescribed heart failure medications and take routinely over next 30 days  Outcome: Adequate for Discharge     Problem: Poor Understanding of Medications  Goal: Patient will verbalize understanding of all prescribed heart failure medications and take routinely over next 30 days  Outcome: Adequate for Discharge     Problem: Unawere Of When To Call MD  Goal: Patient Will Verbalize Understanding Of Heart Failure Flare-Up Symptoms Over Netxt 30 Days  Outcome: Adequate for Discharge  Goal: Patient Will Verbalize Understanding Of Heart Failure Flare-Up Symptoms Over Netxt 30 Days  Outcome: Adequate for Discharge   The patient's goals for the shift include      The clinical goals for the shift include paitent will maintain pulse ox >92% this shift    Patient met all goals prior to discharge.

## 2024-02-10 NOTE — NURSING NOTE
Cardiovascular Nurse Navigator Education Follow-Up    Patient and family to meet with [facility] for palliative and hospice information. Their current plan is to dc to [facility] with palliative then transition home with either palliative or hospice pending pt's progress or deterioration.   Patient more alert this morning, therefore HF education that was presented to him last Friday was reinforced, including information regarding Entresto and Jardiance. Price check for both obtained. Entresto is $346.48 per month. Farxiga is $530.58 per month. Family given information and this cost is steep for them. Information regarding the  [facility] Plan. They are to contact me when they move from SNF to home to initiate and they have my contact information.

## 2024-02-10 NOTE — PROGRESS NOTES
02/10/24 1242   Discharge Planning   Living Arrangements Spouse/significant other   Support Systems Children   Type of Residence Skilled nursing facility   Home or Post Acute Services Post acute facilities (Rehab/SNF/etc)   Type of Post Acute Facility Services Skilled nursing   Patient expects to be discharged to: [facility]   Does the patient need discharge transport arranged? Yes   RoundTrip coordination needed? Yes   Has discharge transport been arranged? Yes   What day is the transport expected? 02/10/24   What time is the transport expected? 1400     Received d/c orders. ELENI Jimenez and 7000 to be completed by DSC. Transport set for 2pm. Updated nursing and family.

## 2024-02-12 ENCOUNTER — TELEPHONE (OUTPATIENT)
Dept: PRIMARY CARE | Facility: CLINIC | Age: 89
End: 2024-02-12

## 2024-02-12 ENCOUNTER — APPOINTMENT (OUTPATIENT)
Dept: RADIOLOGY | Facility: HOSPITAL | Age: 89
End: 2024-02-12
Payer: MEDICARE

## 2024-02-12 ENCOUNTER — HOSPITAL ENCOUNTER (EMERGENCY)
Facility: HOSPITAL | Age: 89
Discharge: HOSPICE | End: 2024-02-12
Attending: STUDENT IN AN ORGANIZED HEALTH CARE EDUCATION/TRAINING PROGRAM
Payer: MEDICARE

## 2024-02-12 VITALS
WEIGHT: 162.26 LBS | OXYGEN SATURATION: 94 % | BODY MASS INDEX: 24.59 KG/M2 | HEIGHT: 68 IN | DIASTOLIC BLOOD PRESSURE: 89 MMHG | RESPIRATION RATE: 18 BRPM | SYSTOLIC BLOOD PRESSURE: 147 MMHG | TEMPERATURE: 98.2 F | HEART RATE: 87 BPM

## 2024-02-12 DIAGNOSIS — J96.21 ACUTE ON CHRONIC HYPOXIC RESPIRATORY FAILURE (MULTI): Primary | ICD-10-CM

## 2024-02-12 DIAGNOSIS — I50.32 CHRONIC DIASTOLIC HEART FAILURE (MULTI): Primary | Chronic | ICD-10-CM

## 2024-02-12 DIAGNOSIS — I35.0 NONRHEUMATIC AORTIC VALVE STENOSIS: ICD-10-CM

## 2024-02-12 DIAGNOSIS — J96.11 CHRONIC RESPIRATORY FAILURE WITH HYPOXIA (MULTI): ICD-10-CM

## 2024-02-12 DIAGNOSIS — R13.10 DYSPHAGIA, UNSPECIFIED TYPE: ICD-10-CM

## 2024-02-12 LAB
ALBUMIN SERPL BCP-MCNC: 3 G/DL (ref 3.4–5)
ALBUMIN SERPL BCP-MCNC: 3.1 G/DL (ref 3.4–5)
ALP SERPL-CCNC: 34 U/L (ref 33–136)
ALP SERPL-CCNC: 38 U/L (ref 33–136)
ALT SERPL W P-5'-P-CCNC: 19 U/L (ref 10–52)
ALT SERPL W P-5'-P-CCNC: 21 U/L (ref 10–52)
ANION GAP BLDV CALCULATED.4IONS-SCNC: 7 MMOL/L (ref 10–25)
ANION GAP SERPL CALC-SCNC: 13 MMOL/L (ref 10–20)
ANION GAP SERPL CALC-SCNC: 13 MMOL/L (ref 10–20)
AST SERPL W P-5'-P-CCNC: 12 U/L (ref 9–39)
AST SERPL W P-5'-P-CCNC: 35 U/L (ref 9–39)
BASE EXCESS BLDV CALC-SCNC: 6.4 MMOL/L (ref -2–3)
BASOPHILS # BLD AUTO: 0.03 X10*3/UL (ref 0–0.1)
BASOPHILS NFR BLD AUTO: 0.2 %
BILIRUB SERPL-MCNC: 0.5 MG/DL (ref 0–1.2)
BILIRUB SERPL-MCNC: 0.6 MG/DL (ref 0–1.2)
BNP SERPL-MCNC: 656 PG/ML (ref 0–99)
BODY TEMPERATURE: ABNORMAL
BUN SERPL-MCNC: 57 MG/DL (ref 6–23)
BUN SERPL-MCNC: 58 MG/DL (ref 6–23)
CA-I BLDV-SCNC: 1.18 MMOL/L (ref 1.1–1.33)
CALCIUM SERPL-MCNC: 8.7 MG/DL (ref 8.6–10.3)
CALCIUM SERPL-MCNC: 8.8 MG/DL (ref 8.6–10.3)
CARDIAC TROPONIN I PNL SERPL HS: 40 NG/L (ref 0–20)
CARDIAC TROPONIN I PNL SERPL HS: 44 NG/L (ref 0–20)
CHLORIDE BLDV-SCNC: 102 MMOL/L (ref 98–107)
CHLORIDE SERPL-SCNC: 100 MMOL/L (ref 98–107)
CHLORIDE SERPL-SCNC: 100 MMOL/L (ref 98–107)
CO2 SERPL-SCNC: 30 MMOL/L (ref 21–32)
CO2 SERPL-SCNC: 31 MMOL/L (ref 21–32)
CREAT SERPL-MCNC: 1.87 MG/DL (ref 0.5–1.3)
CREAT SERPL-MCNC: 1.87 MG/DL (ref 0.5–1.3)
EGFRCR SERPLBLD CKD-EPI 2021: 33 ML/MIN/1.73M*2
EGFRCR SERPLBLD CKD-EPI 2021: 33 ML/MIN/1.73M*2
EOSINOPHIL # BLD AUTO: 0.02 X10*3/UL (ref 0–0.4)
EOSINOPHIL NFR BLD AUTO: 0.1 %
ERYTHROCYTE [DISTWIDTH] IN BLOOD BY AUTOMATED COUNT: 13.9 % (ref 11.5–14.5)
FLUAV RNA RESP QL NAA+PROBE: NOT DETECTED
FLUBV RNA RESP QL NAA+PROBE: NOT DETECTED
GLUCOSE BLDV-MCNC: 185 MG/DL (ref 74–99)
GLUCOSE SERPL-MCNC: 167 MG/DL (ref 74–99)
GLUCOSE SERPL-MCNC: 177 MG/DL (ref 74–99)
HCO3 BLDV-SCNC: 32.2 MMOL/L (ref 22–26)
HCT VFR BLD AUTO: 32.2 % (ref 41–52)
HCT VFR BLD EST: 30 % (ref 41–52)
HGB BLD-MCNC: 9.8 G/DL (ref 13.5–17.5)
HGB BLDV-MCNC: 10.1 G/DL (ref 13.5–17.5)
HOLD SPECIMEN: NORMAL
IMM GRANULOCYTES # BLD AUTO: 0.09 X10*3/UL (ref 0–0.5)
IMM GRANULOCYTES NFR BLD AUTO: 0.6 % (ref 0–0.9)
INHALED O2 CONCENTRATION: 36 %
LACTATE BLDV-SCNC: 0.9 MMOL/L (ref 0.4–2)
LYMPHOCYTES # BLD AUTO: 0.39 X10*3/UL (ref 0.8–3)
LYMPHOCYTES NFR BLD AUTO: 2.5 %
MAGNESIUM SERPL-MCNC: 2.2 MG/DL (ref 1.6–2.4)
MAGNESIUM SERPL-MCNC: 2.46 MG/DL (ref 1.6–2.4)
MAGNESIUM SERPL-MCNC: 2.73 MG/DL (ref 1.6–2.4)
MCH RBC QN AUTO: 28.4 PG (ref 26–34)
MCHC RBC AUTO-ENTMCNC: 30.4 G/DL (ref 32–36)
MCV RBC AUTO: 93 FL (ref 80–100)
MONOCYTES # BLD AUTO: 1 X10*3/UL (ref 0.05–0.8)
MONOCYTES NFR BLD AUTO: 6.5 %
NEUTROPHILS # BLD AUTO: 13.94 X10*3/UL (ref 1.6–5.5)
NEUTROPHILS NFR BLD AUTO: 90.1 %
NRBC BLD-RTO: 0 /100 WBCS (ref 0–0)
OXYHGB MFR BLDV: 87.9 % (ref 45–75)
PCO2 BLDV: 52 MM HG (ref 41–51)
PH BLDV: 7.4 PH (ref 7.33–7.43)
PLATELET # BLD AUTO: 142 X10*3/UL (ref 150–450)
PO2 BLDV: 60 MM HG (ref 35–45)
POTASSIUM BLDV-SCNC: 5.2 MMOL/L (ref 3.5–5.3)
POTASSIUM SERPL-SCNC: 4.8 MMOL/L (ref 3.5–5.3)
POTASSIUM SERPL-SCNC: 7.2 MMOL/L (ref 3.5–5.3)
PROT SERPL-MCNC: 6 G/DL (ref 6.4–8.2)
PROT SERPL-MCNC: 6.4 G/DL (ref 6.4–8.2)
RBC # BLD AUTO: 3.45 X10*6/UL (ref 4.5–5.9)
SAO2 % BLDV: 90 % (ref 45–75)
SARS-COV-2 RNA RESP QL NAA+PROBE: NOT DETECTED
SODIUM BLDV-SCNC: 136 MMOL/L (ref 136–145)
SODIUM SERPL-SCNC: 136 MMOL/L (ref 136–145)
SODIUM SERPL-SCNC: 139 MMOL/L (ref 136–145)
WBC # BLD AUTO: 15.5 X10*3/UL (ref 4.4–11.3)

## 2024-02-12 PROCEDURE — 76604 US EXAM CHEST: CPT | Performed by: STUDENT IN AN ORGANIZED HEALTH CARE EDUCATION/TRAINING PROGRAM

## 2024-02-12 PROCEDURE — 85025 COMPLETE CBC W/AUTO DIFF WBC: CPT | Performed by: STUDENT IN AN ORGANIZED HEALTH CARE EDUCATION/TRAINING PROGRAM

## 2024-02-12 PROCEDURE — 2500000004 HC RX 250 GENERAL PHARMACY W/ HCPCS (ALT 636 FOR OP/ED): Performed by: STUDENT IN AN ORGANIZED HEALTH CARE EDUCATION/TRAINING PROGRAM

## 2024-02-12 PROCEDURE — 71046 X-RAY EXAM CHEST 2 VIEWS: CPT | Mod: FOREIGN READ | Performed by: RADIOLOGY

## 2024-02-12 PROCEDURE — 71046 X-RAY EXAM CHEST 2 VIEWS: CPT

## 2024-02-12 PROCEDURE — 92610 EVALUATE SWALLOWING FUNCTION: CPT | Mod: GN | Performed by: STUDENT IN AN ORGANIZED HEALTH CARE EDUCATION/TRAINING PROGRAM

## 2024-02-12 PROCEDURE — 93308 TTE F-UP OR LMTD: CPT | Performed by: STUDENT IN AN ORGANIZED HEALTH CARE EDUCATION/TRAINING PROGRAM

## 2024-02-12 PROCEDURE — 99283 EMERGENCY DEPT VISIT LOW MDM: CPT | Performed by: NURSE PRACTITIONER

## 2024-02-12 PROCEDURE — 83880 ASSAY OF NATRIURETIC PEPTIDE: CPT | Performed by: STUDENT IN AN ORGANIZED HEALTH CARE EDUCATION/TRAINING PROGRAM

## 2024-02-12 PROCEDURE — 84484 ASSAY OF TROPONIN QUANT: CPT | Performed by: STUDENT IN AN ORGANIZED HEALTH CARE EDUCATION/TRAINING PROGRAM

## 2024-02-12 PROCEDURE — 96375 TX/PRO/DX INJ NEW DRUG ADDON: CPT | Mod: 59

## 2024-02-12 PROCEDURE — 99285 EMERGENCY DEPT VISIT HI MDM: CPT | Performed by: STUDENT IN AN ORGANIZED HEALTH CARE EDUCATION/TRAINING PROGRAM

## 2024-02-12 PROCEDURE — 2500000002 HC RX 250 W HCPCS SELF ADMINISTERED DRUGS (ALT 637 FOR MEDICARE OP, ALT 636 FOR OP/ED): Performed by: NURSE PRACTITIONER

## 2024-02-12 PROCEDURE — 84132 ASSAY OF SERUM POTASSIUM: CPT | Performed by: STUDENT IN AN ORGANIZED HEALTH CARE EDUCATION/TRAINING PROGRAM

## 2024-02-12 PROCEDURE — 94640 AIRWAY INHALATION TREATMENT: CPT

## 2024-02-12 PROCEDURE — 96365 THER/PROPH/DIAG IV INF INIT: CPT | Mod: 59

## 2024-02-12 PROCEDURE — 99223 1ST HOSP IP/OBS HIGH 75: CPT | Performed by: INTERNAL MEDICINE

## 2024-02-12 PROCEDURE — 36415 COLL VENOUS BLD VENIPUNCTURE: CPT | Performed by: STUDENT IN AN ORGANIZED HEALTH CARE EDUCATION/TRAINING PROGRAM

## 2024-02-12 PROCEDURE — 87636 SARSCOV2 & INF A&B AMP PRB: CPT | Performed by: STUDENT IN AN ORGANIZED HEALTH CARE EDUCATION/TRAINING PROGRAM

## 2024-02-12 PROCEDURE — 83735 ASSAY OF MAGNESIUM: CPT | Performed by: NURSE PRACTITIONER

## 2024-02-12 PROCEDURE — 83735 ASSAY OF MAGNESIUM: CPT | Performed by: STUDENT IN AN ORGANIZED HEALTH CARE EDUCATION/TRAINING PROGRAM

## 2024-02-12 PROCEDURE — 99285 EMERGENCY DEPT VISIT HI MDM: CPT | Mod: 25 | Performed by: STUDENT IN AN ORGANIZED HEALTH CARE EDUCATION/TRAINING PROGRAM

## 2024-02-12 RX ORDER — ALUMINUM HYDROXIDE, MAGNESIUM HYDROXIDE, AND SIMETHICONE 1200; 120; 1200 MG/30ML; MG/30ML; MG/30ML
20 SUSPENSION ORAL EVERY 4 HOURS PRN
COMMUNITY
End: 2024-02-12

## 2024-02-12 RX ORDER — TAMSULOSIN HYDROCHLORIDE 0.4 MG/1
0.4 CAPSULE ORAL NIGHTLY
Status: DISCONTINUED | OUTPATIENT
Start: 2024-02-12 | End: 2024-02-12 | Stop reason: HOSPADM

## 2024-02-12 RX ORDER — POLYETHYLENE GLYCOL 3350 17 G/17G
17 POWDER, FOR SOLUTION ORAL DAILY
Status: DISCONTINUED | OUTPATIENT
Start: 2024-02-12 | End: 2024-02-12 | Stop reason: HOSPADM

## 2024-02-12 RX ORDER — ONDANSETRON 4 MG/1
4 TABLET, ORALLY DISINTEGRATING ORAL EVERY 8 HOURS PRN
Status: DISCONTINUED | OUTPATIENT
Start: 2024-02-12 | End: 2024-02-12 | Stop reason: HOSPADM

## 2024-02-12 RX ORDER — BUDESONIDE 0.5 MG/2ML
0.5 INHALANT ORAL
Status: DISCONTINUED | OUTPATIENT
Start: 2024-02-12 | End: 2024-02-12 | Stop reason: HOSPADM

## 2024-02-12 RX ORDER — FUROSEMIDE 10 MG/ML
20 INJECTION INTRAMUSCULAR; INTRAVENOUS ONCE
Status: COMPLETED | OUTPATIENT
Start: 2024-02-12 | End: 2024-02-12

## 2024-02-12 RX ORDER — GUAIFENESIN 100 MG/5ML
300 SOLUTION ORAL EVERY 6 HOURS PRN
COMMUNITY
End: 2024-02-12

## 2024-02-12 RX ORDER — ACETAMINOPHEN 650 MG/1
650 SUPPOSITORY RECTAL EVERY 4 HOURS PRN
Status: DISCONTINUED | OUTPATIENT
Start: 2024-02-12 | End: 2024-02-12 | Stop reason: HOSPADM

## 2024-02-12 RX ORDER — METOPROLOL SUCCINATE 25 MG/1
25 TABLET, EXTENDED RELEASE ORAL DAILY
COMMUNITY

## 2024-02-12 RX ORDER — SIMVASTATIN 40 MG/1
40 TABLET, FILM COATED ORAL DAILY
Qty: 90 TABLET | Refills: 3 | OUTPATIENT
Start: 2024-02-12 | End: 2024-02-12

## 2024-02-12 RX ORDER — ALBUTEROL SULFATE 0.83 MG/ML
2.5 SOLUTION RESPIRATORY (INHALATION) 3 TIMES DAILY
COMMUNITY
End: 2024-02-12

## 2024-02-12 RX ORDER — SACUBITRIL AND VALSARTAN 24; 26 MG/1; MG/1
0.5 TABLET, FILM COATED ORAL 2 TIMES DAILY
COMMUNITY
End: 2024-02-12

## 2024-02-12 RX ORDER — IPRATROPIUM BROMIDE AND ALBUTEROL SULFATE 2.5; .5 MG/3ML; MG/3ML
3 SOLUTION RESPIRATORY (INHALATION)
Status: DISCONTINUED | OUTPATIENT
Start: 2024-02-12 | End: 2024-02-12 | Stop reason: HOSPADM

## 2024-02-12 RX ORDER — HEPARIN SODIUM 5000 [USP'U]/ML
5000 INJECTION, SOLUTION INTRAVENOUS; SUBCUTANEOUS EVERY 8 HOURS SCHEDULED
Status: DISCONTINUED | OUTPATIENT
Start: 2024-02-12 | End: 2024-02-12

## 2024-02-12 RX ORDER — FUROSEMIDE 10 MG/ML
20 INJECTION INTRAMUSCULAR; INTRAVENOUS 2 TIMES DAILY
Status: DISCONTINUED | OUTPATIENT
Start: 2024-02-13 | End: 2024-02-12

## 2024-02-12 RX ORDER — GUAIFENESIN 100 MG/5ML
300 SOLUTION ORAL EVERY 6 HOURS PRN
Status: DISCONTINUED | OUTPATIENT
Start: 2024-02-12 | End: 2024-02-12 | Stop reason: HOSPADM

## 2024-02-12 RX ORDER — KETOROLAC TROMETHAMINE 15 MG/ML
15 INJECTION, SOLUTION INTRAMUSCULAR; INTRAVENOUS EVERY 6 HOURS PRN
Status: DISCONTINUED | OUTPATIENT
Start: 2024-02-12 | End: 2024-02-12 | Stop reason: HOSPADM

## 2024-02-12 RX ORDER — FLUTICASONE FUROATE AND VILANTEROL 200; 25 UG/1; UG/1
1 POWDER RESPIRATORY (INHALATION) DAILY
Status: DISCONTINUED | OUTPATIENT
Start: 2024-02-12 | End: 2024-02-12 | Stop reason: CLARIF

## 2024-02-12 RX ORDER — LORAZEPAM 2 MG/ML
0.5 INJECTION INTRAMUSCULAR EVERY 4 HOURS PRN
Status: DISCONTINUED | OUTPATIENT
Start: 2024-02-12 | End: 2024-02-12 | Stop reason: HOSPADM

## 2024-02-12 RX ORDER — HALOPERIDOL 5 MG/ML
1 INJECTION INTRAMUSCULAR EVERY 4 HOURS PRN
Status: DISCONTINUED | OUTPATIENT
Start: 2024-02-12 | End: 2024-02-12 | Stop reason: HOSPADM

## 2024-02-12 RX ORDER — FAMOTIDINE 20 MG/1
10 TABLET, FILM COATED ORAL NIGHTLY
Status: DISCONTINUED | OUTPATIENT
Start: 2024-02-12 | End: 2024-02-12 | Stop reason: HOSPADM

## 2024-02-12 RX ORDER — ACETAMINOPHEN 325 MG/1
650 TABLET ORAL EVERY 4 HOURS PRN
Status: DISCONTINUED | OUTPATIENT
Start: 2024-02-12 | End: 2024-02-12

## 2024-02-12 RX ORDER — ONDANSETRON HYDROCHLORIDE 2 MG/ML
4 INJECTION, SOLUTION INTRAVENOUS EVERY 8 HOURS PRN
Status: DISCONTINUED | OUTPATIENT
Start: 2024-02-12 | End: 2024-02-12 | Stop reason: HOSPADM

## 2024-02-12 RX ORDER — TAMSULOSIN HYDROCHLORIDE 0.4 MG/1
0.4 CAPSULE ORAL NIGHTLY
Qty: 90 CAPSULE | Refills: 3 | Status: SHIPPED | OUTPATIENT
Start: 2024-02-12 | End: 2024-02-16

## 2024-02-12 RX ORDER — DAPAGLIFLOZIN 10 MG/1
10 TABLET, FILM COATED ORAL DAILY
Status: DISCONTINUED | OUTPATIENT
Start: 2024-02-12 | End: 2024-02-12

## 2024-02-12 RX ORDER — SIMVASTATIN 40 MG/1
40 TABLET, FILM COATED ORAL DAILY
Status: DISCONTINUED | OUTPATIENT
Start: 2024-02-12 | End: 2024-02-12

## 2024-02-12 RX ORDER — CEFTRIAXONE 2 G/50ML
2 INJECTION, SOLUTION INTRAVENOUS EVERY 24 HOURS
Status: DISCONTINUED | OUTPATIENT
Start: 2024-02-13 | End: 2024-02-12

## 2024-02-12 RX ORDER — METOPROLOL SUCCINATE 25 MG/1
25 TABLET, EXTENDED RELEASE ORAL DAILY
Status: DISCONTINUED | OUTPATIENT
Start: 2024-02-12 | End: 2024-02-12 | Stop reason: HOSPADM

## 2024-02-12 RX ORDER — FORMOTEROL FUMARATE DIHYDRATE 20 UG/2ML
20 SOLUTION RESPIRATORY (INHALATION)
Status: DISCONTINUED | OUTPATIENT
Start: 2024-02-12 | End: 2024-02-12 | Stop reason: HOSPADM

## 2024-02-12 RX ORDER — ACETAMINOPHEN 650 MG/1
650 SUPPOSITORY RECTAL EVERY 4 HOURS PRN
Status: DISCONTINUED | OUTPATIENT
Start: 2024-02-12 | End: 2024-02-12

## 2024-02-12 RX ORDER — AMLODIPINE BESYLATE 10 MG/1
10 TABLET ORAL DAILY
Status: DISCONTINUED | OUTPATIENT
Start: 2024-02-12 | End: 2024-02-12

## 2024-02-12 RX ORDER — LEVOFLOXACIN 5 MG/ML
750 INJECTION, SOLUTION INTRAVENOUS ONCE
Status: COMPLETED | OUTPATIENT
Start: 2024-02-12 | End: 2024-02-12

## 2024-02-12 RX ORDER — FERROUS SULFATE 325(65) MG
65 TABLET ORAL DAILY
Status: DISCONTINUED | OUTPATIENT
Start: 2024-02-12 | End: 2024-02-12

## 2024-02-12 RX ORDER — ACETAMINOPHEN 325 MG/1
650 TABLET ORAL EVERY 4 HOURS PRN
COMMUNITY
End: 2024-02-12

## 2024-02-12 RX ORDER — GLYCOPYRROLATE 0.2 MG/ML
0.2 INJECTION INTRAMUSCULAR; INTRAVENOUS EVERY 4 HOURS PRN
Status: DISCONTINUED | OUTPATIENT
Start: 2024-02-12 | End: 2024-02-12 | Stop reason: HOSPADM

## 2024-02-12 RX ORDER — ADHESIVE BANDAGE
30 BANDAGE TOPICAL DAILY PRN
COMMUNITY
End: 2024-02-12

## 2024-02-12 RX ORDER — ACETAMINOPHEN 160 MG/5ML
650 SOLUTION ORAL EVERY 4 HOURS PRN
Status: DISCONTINUED | OUTPATIENT
Start: 2024-02-12 | End: 2024-02-12 | Stop reason: HOSPADM

## 2024-02-12 RX ORDER — LEVOFLOXACIN 5 MG/ML
750 INJECTION, SOLUTION INTRAVENOUS EVERY 24 HOURS
Status: DISCONTINUED | OUTPATIENT
Start: 2024-02-12 | End: 2024-02-12

## 2024-02-12 RX ORDER — AMLODIPINE BESYLATE 10 MG/1
10 TABLET ORAL DAILY
Qty: 90 TABLET | Refills: 3 | OUTPATIENT
Start: 2024-02-12 | End: 2024-02-12

## 2024-02-12 RX ORDER — CHOLECALCIFEROL (VITAMIN D3) 25 MCG
5000 TABLET ORAL DAILY
Status: DISCONTINUED | OUTPATIENT
Start: 2024-02-12 | End: 2024-02-12

## 2024-02-12 RX ORDER — ACETAMINOPHEN 160 MG/5ML
650 SOLUTION ORAL EVERY 4 HOURS PRN
Status: DISCONTINUED | OUTPATIENT
Start: 2024-02-12 | End: 2024-02-12

## 2024-02-12 RX ORDER — ACETAMINOPHEN 325 MG/1
650 TABLET ORAL EVERY 4 HOURS PRN
Status: DISCONTINUED | OUTPATIENT
Start: 2024-02-12 | End: 2024-02-12 | Stop reason: HOSPADM

## 2024-02-12 RX ORDER — ASPIRIN 81 MG/1
81 TABLET ORAL NIGHTLY
Status: DISCONTINUED | OUTPATIENT
Start: 2024-02-12 | End: 2024-02-12

## 2024-02-12 RX ADMIN — LEVOFLOXACIN 750 MG: 5 INJECTION, SOLUTION INTRAVENOUS at 07:22

## 2024-02-12 RX ADMIN — IPRATROPIUM BROMIDE AND ALBUTEROL SULFATE 3 ML: 2.5; .5 SOLUTION RESPIRATORY (INHALATION) at 13:23

## 2024-02-12 RX ADMIN — FUROSEMIDE 20 MG: 10 INJECTION, SOLUTION INTRAMUSCULAR; INTRAVENOUS at 06:36

## 2024-02-12 ASSESSMENT — LIFESTYLE VARIABLES
EVER HAD A DRINK FIRST THING IN THE MORNING TO STEADY YOUR NERVES TO GET RID OF A HANGOVER: NO
HAVE YOU EVER FELT YOU SHOULD CUT DOWN ON YOUR DRINKING: NO
EVER FELT BAD OR GUILTY ABOUT YOUR DRINKING: NO
HAVE PEOPLE ANNOYED YOU BY CRITICIZING YOUR DRINKING: NO

## 2024-02-12 ASSESSMENT — ENCOUNTER SYMPTOMS
ABDOMINAL DISTENTION: 0
HEMATOLOGIC/LYMPHATIC NEGATIVE: 1
MUSCULOSKELETAL NEGATIVE: 1
PALPITATIONS: 0
NAUSEA: 0
PSYCHIATRIC NEGATIVE: 1
ABDOMINAL PAIN: 0
SHORTNESS OF BREATH: 1
EYES NEGATIVE: 1
DIARRHEA: 0
LIGHT-HEADEDNESS: 0
ENDOCRINE NEGATIVE: 1
DIZZINESS: 0
CHILLS: 0
CHEST TIGHTNESS: 0
FEVER: 0

## 2024-02-12 ASSESSMENT — PAIN SCALES - GENERAL
PAINLEVEL_OUTOF10: 0 - NO PAIN
PAINLEVEL_OUTOF10: 0 - NO PAIN

## 2024-02-12 ASSESSMENT — PAIN - FUNCTIONAL ASSESSMENT
PAIN_FUNCTIONAL_ASSESSMENT: 0-10
PAIN_FUNCTIONAL_ASSESSMENT: 0-10

## 2024-02-12 NOTE — TELEPHONE ENCOUNTER
[facility] with [facility] requests palliative care order  Fax # [phone] att Keyanna   Phone [phone]   Please put order in system thanks

## 2024-02-12 NOTE — PROGRESS NOTES
LSW Hospice Note    Michael Sandra is a Hospice Patient.   Hospice terminal diagnosis: CHF, COPD  Physician: Nj  Visit type: New admission, transfer    Comments/recommendations: LSW met with pt and family regarding hospice services and discharge options.  Reviewed current symptoms and family interested in transfer to [place] for GIP for dyspnea.  Agreed with transfer via [facility] today at 5pm.      Discharge Planning:  Patient to be discharged to  [facility]    The following is to be completed:  Discharge order:   State DNR signed by MD:   Nursing facility referral/transfer form:   Medication reconciliation:   PAS/RR or convalescent stay form:   Prescriptions for al narcotics/new medications:   Transportation: [facility] 5pm  Other:     Plan of care reviewed with patient/family members Karsten-son, Ritchie-dtr in law, Emily-spouse, pt   Plan of care reviewed with hospital staff members: Kym HARPER, Dr. Mauro, Danielle Sauceda RN, Devorah Bruce CNP     Please notify [facility] of any changes in condition. Thank you.  Office: [phone] (8 am-6:30 pm M-F and 8 am-4:30 pm weekends and holidays)   [phone] (6:30 pm-8 am M-F and 4:30 pm-8 am weekends and holidays)    JORDAN Crain

## 2024-02-12 NOTE — CONSULTS
"Reason For Consult    Goals of care     History Of Present Illness  Michael Sandra is a 89 y.o. male presenting with SOB.     Past Medical History  He has a past medical history of Pain in toes of both feet (02/02/2024), Pain of toe (02/02/2024), and Personal history of malignant neoplasm of prostate (03/22/2021).    Surgical History  He has a past surgical history that includes Other surgical history (03/22/2021).     Social History  He reports that he has never smoked. He has never used smokeless tobacco. He reports that he does not drink alcohol and does not use drugs.    Family History  Family History   Problem Relation Name Age of Onset    Hypertension Mother      Heart attack Mother          Allergies  Penicillins       Last Recorded Vitals  Blood pressure 152/76, pulse 74, temperature 36.3 °C (97.3 °F), temperature source Temporal, resp. rate (!) 31, height 1.727 m (5' 8\"), weight 73.6 kg (162 lb 4.1 oz), SpO2 (!) 92 %.       Assessment/Plan     Met with pt and extended family in ED. Pt familiar to Palliative Care from previous admission.  Pt weak and extremely SOB. Pt on 6L 02. Pt was at [facility] less than 36 hours, pt continued to decline. Goals of care discussion with pt and family, pt is alert and oriented x4, however extremely [place]. Pt agreeable to comfort care, DNRCC, comfort meds, no Bi-Pap/C-Pap and to consult [facility]osp[facility] from ED. Attending and NP joined in conversation, and pt/family remain agreeable to consult [facility]. [facility] referral placed. Pt/family would like pt to go to [place]  to meet bedside with pt/family around 330-4p today. Await results of Hospice meeting, will follow.     Hospice consents signed, plan for discharge to [facility]. Transport for 5p. Family aware and agreeable to Hospice plan of care, discharge and transport.       Kym Narvaez LCSW    "

## 2024-02-12 NOTE — PROGRESS NOTES
"Speech-Language Pathology    Inpatient Speech-Language Pathology Clinical Swallow Evaluation    Patient Name: José Antonio Sandra \"Michael\"  MRN: [id number]  Today's Date: 2/12/2024   Time Calculation  Start Time: 1313  Stop Time: 1323  Time Calculation (min): 10 min         Current Problem:   1. Acute on chronic hypoxic respiratory failure (CMS/HCC)        2. Dysphagia, unspecified type [R13.10]          Recommendations:  Risk for Aspiration: Yes   Solid Diet Recommendations : Regular (IDDSI Level 7)   Liquid Diet Recommendations: Thin (IDDSI Level 0)   Aspiration Precautions: Upright 90 degrees as possible for all oral intake, Remain upright for 20-30 minutes after meals, Alternate solids and liquids, Small bites/sips, Eat/feed slowly     Medication Administration Recommendations: Whole, With Liquid      Assessment:  Consistencies Trialed: Consistencies Trialed: Thin (IDDSI Level 0) - Cup, Thin (IDDSI Level 0) - Straw, Pureed/extremely thick (IDDSI Level 4), Regular (IDDSI Level 7)   Oral Motor: Within Functional Limits      Dentition: Adequate/Natural   Assessment Results: Patient presents with no suspected pharyngeal dysphagia upon completion of a clinical swallow evaluation this date. Oral motor assessment unremarkable. The 3oz water challenge was not attempted due to COPD exacerbation. Patient was observed with thin liquid via cup/straw, puree and solids absent of overt s/s of aspiration. Silent aspiration cannot be substantiated at the bedside but is not highly suspected at this time. Recommend patient continue with baseline diet with aspiration precautions. Speech therapy to follow.  Prognosis: Good   Medical Staff Made Aware: Yes     Baseline Assessment:  Respiratory Status: Oxygen via nasal cannula   Patient Positioning: Upright in Bed      Relevant Imaging:  CXR 2/12 IMPRESSION:  Moderate interstitial edema perhaps slightly worsened since prior.  Bilateral pleural effusions left greater than right " "unchanged.    Plan:  SLP Plan: Skilled SLP Skilled speech therapy for dysphagia treatment is warranted in order to provide training and instruction regarding the use of compensatory swallow strategies, oropharyngeal strengthening exercises, and pt/caregiver education in order to reduce risk of aspiration, dehydration and malnutrition.  SLP Frequency: 2x per week   Duration: 2 weeks   Next Treatment Priority: diet bruno v MBSS   Discussed POC: Patient, Nursing, Caregiver/family   Discussed Risks/Benefits: Yes   Patient/Caregiver Agreeable: Yes   SLP Discharge Recommendations: unable to determine at this time, refer to subsequent notes    Goals: Established 2/12/24  Patient will:  Tolerate recommended oral diet absent of overt s/s of aspiration and without pulmonary compromise.  Implement safe swallowing strategies to reduce risk of aspiration in 100% of trials given caregiver assistance/cueing as needed.  Complete a modified barium swallow study (MBSS) as warranted upon further assessment/discussion with medical team for objective assessment of oropharyngeal swallow function, to assess for aspiration, and to guide further recommendations and treatment plan.    Subjective   Patient alert to person, place and year. Pleasant and cooperative for the evaluation.    General Visit Information:  Pt. Admitted  Pt not admitted at this time; CSE completed in the ED  Past medical history: Past Medical History Relevant to Rehab: 89 y.o. male presents to [facility] ER with chief complaint of acute on chronic hypoxic respiratory failure with pneumonia complicated by chronic heart failure. Just discharged from hospital with acute on chronic heart failure. He was discharged to SNF. He presents back to ER today for hypoxia, requiring 4 lpm nasal cannula, baseline 2 lpm, with BiPAP HS.  He states the SNF did not know how to put on his \"cpap\"on. He states today he was very short of breath, states he feels like his " breathing has improved, however, patient is dyspneic using accessory muscle usage and tachypneic. He denies any cough, congestion, chest pain, abd pain. Patient was not seen by Speech Therapy during the previous admission.    Living Environment: Other (comment)     Ordering Physician: ERIC Hu-CNP     Reason for Referral: Patient was seen for a clinical swallow evaluation (CSE) to assess swallow function, determine least restrictive diet, determine if a modified barium swallow study is warranted and develop appropriate POC for the current setting.      Current Diet : Regular/thin   Prior to Session Communication: Bedside nurse   Pain:  Pain Assessment  Pain Assessment: 0-10  Pain Score: 0 - No pain     Education:  Learner:  Patient, Family, Significant other  Barriers to Learning: Communication limitations  Method: Verbal  Education - Topic: ST provided patient education regarding role of ST, purpose of assessment, clinical impressions, goals of treatment, and plan of care. Patient verbalized full comprehension, consistent with cognitive status. Education will be reinforced. ST further coordinated with RN regarding recommendations and precautions per this assessment, with RN verbalizing understanding.  Outcome:  needs review/reinforcement, partially meets

## 2024-02-12 NOTE — ED PROVIDER NOTES
CC: Shortness of Breath     HPI:  89-year-old male with a history of CHF, COPD on 2 L nasal cannula, prostate cancer, aortic stenosis presents emergency department with worsening hypoxia at his nursing home.  Patient states that he has been feeling more short of breath since he was discharged from the hospital, not coughing up anything, no fevers, no abdominal pain.  Not really able to provide any additional history.  He was found to be satting in the 80s on his 2 L at the nursing home by EMS.  Was just in the hospital for CHF exacerbation and chest pain.    Records Reviewed:  Recent available ED and inpatient notes reviewed in EMR.    PMHx/PSHx:  Per HPI.   - has a past medical history of Pain in toes of both feet (02/02/2024), Pain of toe (02/02/2024), and Personal history of malignant neoplasm of prostate (03/22/2021).  - has a past surgical history that includes Other surgical history (03/22/2021).    Medications:  Reviewed in EMR. See EMR for complete list of medications and doses.    Allergies:  Penicillins    Social History:  - Tobacco:  reports that he has never smoked. He has never used smokeless tobacco.   - Alcohol:  reports no history of alcohol use.   - Illicit Drugs:  reports no history of drug use.     ROS:  Per HPI.       ???????????????????????????????????????????????????????????????  Triage Vitals:  T 36.5 °C (97.7 °F)  HR 68  /61  RR (!) 28  O2 97 % None (Room air)    VS: As documented in the triage note and EMR flowsheet from this visit were reviewed.  General: Unwell appearing.  Eyes: Pupils round and equal. No scleral icterus.   HENT: Atraumatic. Normocephalic. Moist mucous membranes.   CV: Regular rate.  Pitting edema to the knees bilaterally.  Resp: Diminished breath sounds in bases bilaterally.  Crackles to the mid zones.  Tachypneic.  GI: Soft, nontender to palpation. Nondistended.   Skin: Warm, dry, intact. No systemic rashes or lesions appreciated.  Neuro: Alert. No focal neuro  deficits observed. Speech fluent. Answers questions appropriately.   Psych: Appropriate. Kempt.  ???????????????????????????????????????????????????????????????  EKG:  See ED Course    Independent Interpretation of Studies:   See ED Course    ED Course:  ED Course as of 02/12/24 0525   Mon Feb 12, 2024   0452 EKG interpreted by me: A-fib, regular rate, wandering baseline though does not appear to have any obvious ST segment or T wave changes that be concerning for acute ischemia.  Occasional PVC.  Normal QTc and QRS durations. []      ED Course User Index  [MH] Augie Henderson MD       Assessment and Plan:  89-year-old male presents emergency department for worsening dyspnea.  On exam he appeared fluid overloaded.  Vital signs were overall stable although he was tachypneic but was saturating in the mid 90s on 4 L which is higher than his normal 2 L.  Bedside ultrasound showed bilateral pleural effusions, left greater than right as well as concern for right-sided consolidation.  Differential does include pneumonia, particular given his recent hospitalization, versus the fluid overload/CHF exacerbation.  He is not have any other symptoms that were consistent with a viral illness.  He was not wheezing on exam, COPD exacerbation seems less likely.    Given he has allergies, he was started on Levaquin.  Also given 20 mg IV Lasix has his BNP is 650 which is significantly elevated from prior.  Potassium returned at 7.2 on the comp though was markedly hemolyzed and I think is likely erroneous from the hemolysis.  Repeat pending.    Plan for repeat admission for fluid overload, pneumonia, and CHF exacerbation.    Patient signed out to the oncoming provider pending admission and repeat potassium.    Social Determinants Limiting Care:  None identified    Disposition:  Pending admission    Augie Henderson MD        Performed by: Augie Henderson MD  Authorized by: Augie Henderson MD  Cardiac Indications: fluid  overload              Respiratory Indications: hypoxia  Procedure: Cardiac Ultrasound    Findings:   Views: parasternal long, parasternal short, apical four and subxiphoid  Effusion: The pericardial space was visualized and was positive for a PERICARDIAL EFFUSION.  Activity: Ventricular contractions were visualized.  LV: LV systolic function was DECREASED.  RV: RV size was DILATED.    Impression:  Cardiac: The focused cardiac ultrasound exam had ABNORMAL findings as specified.  Procedure: Thoracic Ultrasound    Findings:  R Lung Sliding: The RIGHT chest was evaluated and LUNG SLIDING was visualized.  L Lung Sliding: The LEFT chest was evaluated and LUNG SLIDING was visualized.  R Effusion: The RIGHT chest was evaluated and there was a PLEURAL EFFUSION.  L Effusion: The LEFT chest was evaluated and there was a PLEURAL EFFUSION.  A-lines: The RIGHT chest was evaluated and multiple A-LINES were visualized  B-lines: The RIGHT chest was evaluated and multiple B-LINES were visualized  R Consolidation: The RIGHT chest was evaluated and there was a RIGHT CONSOLIDATION and visualized. (right upper)  L Consolidation: The LEFT chest was evaluated and there was NO LEFT CONSOLIDATION.    Impression:  Thorax: The focused thoracic ultrasound exam had ABNORMAL findings as specified.       ? [employer] last updated 2/12/2024 5:25 AM        Augie Henderson MD  02/12/24 0657

## 2024-02-12 NOTE — DISCHARGE SUMMARY
Discharge Diagnosis  Acute on chronic hypoxic respiratory failure (CMS/[facility])    Issues Requiring Follow-Up  Discharge to [facility]    Discharge Meds     Your medication list        ASK your doctor about these medications        Instructions Last Dose Given Next Dose Due   acetaminophen 325 mg tablet  Commonly known as: Tylenol           albuterol 2.5 mg /3 mL (0.083 %) nebulizer solution           albuterol 90 mcg/actuation inhaler      Inhale 2 puffs every 4 hours if needed for wheezing or shortness of breath.       alum-mag hydroxide-simeth 200-200-20 mg/5 mL oral suspension  Commonly known as: Mylanta           amLODIPine 10 mg tablet  Commonly known as: Norvasc      TAKE 1 TABLET BY MOUTH DAILY       aspirin 81 mg EC tablet           Breo Ellipta 200-25 mcg/dose inhaler  Generic drug: fluticasone furoate-vilanteroL      USE 1 INHALATION BY MOUTH ONCE  DAILY       calcium carbonate 500 mg calcium (1,250 mg) tablet  Commonly known as: Oscal           cholecalciferol 5,000 Units tablet  Commonly known as: Vitamin D-3           dapagliflozin propanediol 10 mg  Commonly known as: Farxiga      Take 1 tablet (10 mg) by mouth once daily.       diclofenac sodium 1 % gel  Commonly known as: Voltaren      Apply 4.5 inches (4 g) topically 4 times a day as needed (chest wall pain).       Entresto 24-26 mg tablet  Generic drug: sacubitriL-valsartan           famotidine 10 mg tablet  Commonly known as: Pepcid      Take 1 tablet (10 mg) by mouth once daily at bedtime.       ferrous sulfate 325 (65 Fe) MG EC tablet      Take 1 tablet by mouth once daily.       guaiFENesin 100 mg/5 mL syrup  Commonly known as: Robitussin           lidocaine 4 % patch      Place 1 patch over 12 hours on the skin once daily. Remove & discard patch within 12 hours or as directed by MD.       magnesium hydroxide 400 mg/5 mL suspension  Commonly known as: Milk of Magnesia           metoprolol succinate XL 25 mg 24 hr tablet  Commonly known as:  "Toprol-XL           oxygen gas therapy  Commonly known as: O2      Inhale 1 each once every 24 hours.       potassium chloride CR 10 mEq ER tablet  Commonly known as: Klor-Con      Take 1 tablet (10 mEq) by mouth once daily at bedtime. Do not crush or chew.       PRESERVISION AREDS-2 ORAL           simvastatin 40 mg tablet  Commonly known as: Zocor  Ask about: Which instructions should I use?      TAKE 1 TABLET BY MOUTH DAILY       Spiriva Respimat 2.5 mcg/actuation inhaler  Generic drug: tiotropium      INHALE 2 INHALATIONS BY MOUTH  DAILY       tamsulosin 0.4 mg 24 hr capsule  Commonly known as: Flomax  Ask about: Which instructions should I use?      TAKE 1 CAPSULE BY MOUTH AT  BEDTIME       torsemide 10 mg tablet  Commonly known as: Demadex      TAKE 1 TABLET BY MOUTH DAILY                 Where to Get Your Medications        These medications were sent to [facility] [address]  [address], [facility] [address]      Phone: [phone]   amLODIPine 10 mg tablet  simvastatin 40 mg tablet  tamsulosin 0.4 mg 24 hr capsule         Test Results Pending At Discharge  Pending Labs       No current pending labs.            Hospital Course  José Antonio Sandra \"Michael\" is a 89 y.o. male presents to [facility] ER with chief complaint of acute on chronic hypoxic respiratory failure with pneumonia complicated by chronic heart failure. Just discharged from hospital with acute on chronic heart failure. He was discharged to SNF. He presents back to ER today for hypoxia, requiring 4 lpm nasal cannula, baseline 2 lpm, with BiPAP HS. He states the CPAP was very uncomfortable for him, he just cannot do that anymore. He likes the nasal cannula, he does not like the mask. He states today he was very short of breath, states he feels like his breathing has improved, however, patient is dyspneic using accessory muscle usage and tachypneic. He denies any " cough, congestion, chest pain, abd pain.   Course:  Patient and patient's family at bedside agreed on hospice care. [facility]. Medications outlined on DC by attending.        Pertinent Physical Exam At Time of Discharge  Physical Exam  Constitutional: Well developed, awake/alert. alert and cooperative   ENMT: mucous membranes moist   Head/Neck: Neck supple   Respiratory/Thorax: Lung sounds dim through out, accessory muscle usage, tachypnea    Cardiovascular: IRR, +murmur   Gastrointestinal: Nondistended, soft, non-tender, no rebound tenderness or guarding, no masses palpable, no organomegaly, +BS, no bruits   Musculoskeletal: ROM intact, no joint swelling, normal strength   Extremities: normal extremities, no cyanosis edema, contusions or wounds, no clubbing   Neurological: alert , hard of hearing     Outpatient Follow-Up  Future Appointments   Date Time Provider Department [place]   2/21/2024  3:45 PM Saul Mauro MD [id number] [place]   3/20/2024  1:00 PM Saul Mauro MD [id number] [place]   4/16/2024 11:00 AM Brii Martin MD [id number] [place]   4/18/2024 11:20 AM Kentrell Russell MD [id number] [place]   4/29/2024  2:30 PM Nesha Noguera MD [id number] [place]   5/4/2024 12:00 PM INF 01 EMILYGBAY [id number] [place]   10/22/2024  1:30 PM Kentrell Russell MD [id number] [place]       Devorah Bruce [facility]  [address] [address]  Phone: [phone] Fax: [phone]  [facility]

## 2024-02-12 NOTE — H&P
"History Of Present Illness  José Antonio Sandra \"Michael\" is a 89 y.o. male presents to [facility] ER with chief complaint of acute on chronic hypoxic respiratory failure with pneumonia complicated by chronic heart failure. Just discharged from hospital with acute on chronic heart failure. He was discharged to SNF. He presents back to ER today for hypoxia, requiring 4 lpm nasal cannula, baseline 2 lpm, with BiPAP HS. He states the CPAP was very uncomfortable for him, he just cannot do that anymore. He likes the nasal cannula, he does not like the mask. He states today he was very short of breath, states he feels like his breathing has improved, however, patient is dyspneic using accessory muscle usage and tachypneic. He denies any cough, congestion, chest pain, abd pain.        Past Medical History  Anemia  Aortic stenosis  Ascending aortic aneurysm (CMS/HCC)  BPH (benign prostatic hyperplasia)  COPD, severe (CMS/HCC)  Dyslipidemia  Dyspnea on exertion  Gastroesophageal reflux disease  Hard of hearing  Iron deficiency anemia  Irregular heartbeat  Low calcium levels  Mild vitamin D deficiency  Age-related osteoporosis without current pathological fracture  Other secondary kyphosis, cervical region  Malignant neoplasm of prostate (CMS/HCC)  Pulmonary hypertension (CMS/HCC)  Restrictive lung disease  Stage 3b chronic kidney disease (CMS/HCC)  Mitral regurgitation  Tricuspid regurgitation  Vitamin B12 deficiency  Oliguria  Rhinitis  Osteopenia  Nuclear senile cataract of both eyes  Nocturia  Mixed hyperlipidemia  History of malignant neoplasm of prostate  Gastroesophageal reflux disease with esophagitis  Chronic respiratory failure with hypoxia (CMS/HCC)  Chronic diastolic heart failure (CMS/HCC)  Essential hypertension  Iron deficiency anemia due to chronic blood loss  Mitral valve insufficiency  Troponin level elevated  Acute on chronic diastolic heart failure (CMS/HCC)        Surgical History  He has a " "past surgical history that includes Other surgical history (03/22/2021).     Social History  He reports that he has never smoked. He has never used smokeless tobacco. He reports that he does not drink alcohol and does not use drugs.    Family History  Family History   Problem Relation Name Age of Onset    Hypertension Mother      Heart attack Mother          Allergies  Penicillins    Review of Systems   Constitutional:  Negative for chills and fever.   HENT: Negative.     Eyes: Negative.    Respiratory:  Positive for shortness of breath. Negative for chest tightness.    Cardiovascular:  Negative for chest pain, palpitations and leg swelling.   Gastrointestinal:  Negative for abdominal distention, abdominal pain, diarrhea and nausea.   Endocrine: Negative.    Genitourinary: Negative.    Musculoskeletal: Negative.    Skin: Negative.    Neurological:  Negative for dizziness, syncope and light-headedness.   Hematological: Negative.    Psychiatric/Behavioral: Negative.        ROS: 12 systems reviewed and negative except per HPI above     Physical Exam by System:     Constitutional: Well developed, awake/alert. alert and cooperative   ENMT: mucous membranes moist   Head/Neck: Neck supple   Respiratory/Thorax: Lung sounds dim through out, accessory muscle usage, tachypnea    Cardiovascular: IRR, +murmur   Gastrointestinal: Nondistended, soft, non-tender, no rebound tenderness or guarding, no masses palpable, no organomegaly, +BS, no bruits   Musculoskeletal: ROM intact, no joint swelling, normal strength   Extremities: normal extremities, no cyanosis edema, contusions or wounds, no clubbing   Neurological: alert , hard of hearing       Last Recorded Vitals  Blood pressure 122/76, pulse 74, temperature 36.3 °C (97.3 °F), temperature source Temporal, resp. rate (!) 25, height 1.727 m (5' 8\"), weight 73.6 kg (162 lb 4.1 oz), SpO2 95 %.    Relevant Results  Results for orders placed or performed during the hospital encounter " of 02/12/24 (from the past 24 hour(s))   CBC and Auto Differential   Result Value Ref Range    WBC 15.5 (H) 4.4 - 11.3 x10*3/uL    nRBC 0.0 0.0 - 0.0 /100 WBCs    RBC 3.45 (L) 4.50 - 5.90 x10*6/uL    Hemoglobin 9.8 (L) 13.5 - 17.5 g/dL    Hematocrit 32.2 (L) 41.0 - 52.0 %    MCV 93 80 - 100 fL    MCH 28.4 26.0 - 34.0 pg    MCHC 30.4 (L) 32.0 - 36.0 g/dL    RDW 13.9 11.5 - 14.5 %    Platelets 142 (L) 150 - 450 x10*3/uL    Neutrophils % 90.1 40.0 - 80.0 %    Immature Granulocytes %, Automated 0.6 0.0 - 0.9 %    Lymphocytes % 2.5 13.0 - 44.0 %    Monocytes % 6.5 2.0 - 10.0 %    Eosinophils % 0.1 0.0 - 6.0 %    Basophils % 0.2 0.0 - 2.0 %    Neutrophils Absolute 13.94 (H) 1.60 - 5.50 x10*3/uL    Immature Granulocytes Absolute, Automated 0.09 0.00 - 0.50 x10*3/uL    Lymphocytes Absolute 0.39 (L) 0.80 - 3.00 x10*3/uL    Monocytes Absolute 1.00 (H) 0.05 - 0.80 x10*3/uL    Eosinophils Absolute 0.02 0.00 - 0.40 x10*3/uL    Basophils Absolute 0.03 0.00 - 0.10 x10*3/uL   Comprehensive metabolic panel   Result Value Ref Range    Glucose 177 (H) 74 - 99 mg/dL    Sodium 136 136 - 145 mmol/L    Potassium 7.2 (HH) 3.5 - 5.3 mmol/L    Chloride 100 98 - 107 mmol/L    Bicarbonate 30 21 - 32 mmol/L    Anion Gap 13 10 - 20 mmol/L    Urea Nitrogen 58 (H) 6 - 23 mg/dL    Creatinine 1.87 (H) 0.50 - 1.30 mg/dL    eGFR 33 (L) >60 mL/min/1.73m*2    Calcium 8.7 8.6 - 10.3 mg/dL    Albumin 3.0 (L) 3.4 - 5.0 g/dL    Alkaline Phosphatase 34 33 - 136 U/L    Total Protein 6.4 6.4 - 8.2 g/dL    AST 35 9 - 39 U/L    Bilirubin, Total 0.6 0.0 - 1.2 mg/dL    ALT 21 10 - 52 U/L   Magnesium   Result Value Ref Range    Magnesium 2.73 (H) 1.60 - 2.40 mg/dL   B-Type Natriuretic Peptide   Result Value Ref Range     (H) 0 - 99 pg/mL   BLOOD GAS VENOUS FULL PANEL   Result Value Ref Range    POCT pH, Venous 7.40 7.33 - 7.43 pH    POCT pCO2, Venous 52 (H) 41 - 51 mm Hg    POCT pO2, Venous 60 (H) 35 - 45 mm Hg    POCT SO2, Venous 90 (H) 45 - 75 %    POCT  Oxy Hemoglobin, Venous 87.9 (H) 45.0 - 75.0 %    POCT Hematocrit Calculated, Venous 30.0 (L) 41.0 - 52.0 %    POCT Sodium, Venous 136 136 - 145 mmol/L    POCT Potassium, Venous 5.2 3.5 - 5.3 mmol/L    POCT Chloride, Venous 102 98 - 107 mmol/L    POCT Ionized Calicum, Venous 1.18 1.10 - 1.33 mmol/L    POCT Glucose, Venous 185 (H) 74 - 99 mg/dL    POCT Lactate, Venous 0.9 0.4 - 2.0 mmol/L    POCT Base Excess, Venous 6.4 (H) -2.0 - 3.0 mmol/L    POCT HCO3 Calculated, Venous 32.2 (H) 22.0 - 26.0 mmol/L    POCT Hemoglobin, Venous 10.1 (L) 13.5 - 17.5 g/dL    POCT Anion Gap, Venous 7.0 (L) 10.0 - 25.0 mmol/L    Patient Temperature      FiO2 36 %   Troponin I, High Sensitivity, Initial   Result Value Ref Range    Troponin I, High Sensitivity 44 (H) 0 - 20 ng/L   Sars-CoV-2 and Influenza A/B PCR   Result Value Ref Range    Flu A Result Not Detected Not Detected    Flu B Result Not Detected Not Detected    Coronavirus 2019, PCR Not Detected Not Detected   Troponin, High Sensitivity, 1 Hour   Result Value Ref Range    Troponin I, High Sensitivity 40 (H) 0 - 20 ng/L      Scheduled medications  levoFLOXacin, 750 mg, intravenous, Once      Continuous medications     PRN medications  PRN medications: torsemide     XR chest 2 views    Result Date: 2/12/2024  STUDY: Chest Radiographs;  2/12/2024 6:08 AM. INDICATION: Hypoxia. COMPARISON: Chest radiograph 2/3/2024, CT chest 10/26/2022. ACCESSION NUMBER(S): [id number] ORDERING CLINICIAN: DELANEY SHABAZZ TECHNIQUE:  Frontal and lateral chest (three images obtained). FINDINGS: CARDIOMEDIASTINAL SILHOUETTE: Mild cardiomegaly similar to prior.  LUNGS: Small right and moderate left pleural effusions similar to prior. Bibasilar hazy opacity and moderate interstitial edema, with interstitial edema perhaps slightly worsened since prior examination..  ABDOMEN: No remarkable upper abdominal findings.  BONES: No acute osseous changes.    Moderate interstitial edema perhaps slightly worsened  since prior. Bilateral pleural effusions left greater than right unchanged. Signed by Shayan Hercules MD    Point of Care Ultrasound    Result Date: 2/12/2024  Augie Henderson MD     2/12/2024  6:57 AM Performed by: Augie Henderson MD Authorized by: Augie Henderson MD  Cardiac Indications: fluid overload Respiratory Indications: hypoxia Procedure: Cardiac Ultrasound Findings:  Views: parasternal long, parasternal short, apical four and subxiphoid Effusion: The pericardial space was visualized and was positive for a PERICARDIAL EFFUSION. Activity: Ventricular contractions were visualized. LV: LV systolic function was DECREASED. RV: RV size was DILATED. Impression: Cardiac: The focused cardiac ultrasound exam had ABNORMAL findings as specified. Procedure: Thoracic Ultrasound Findings: R Lung Sliding: The RIGHT chest was evaluated and LUNG SLIDING was visualized. L Lung Sliding: The LEFT chest was evaluated and LUNG SLIDING was visualized. R Effusion: The RIGHT chest was evaluated and there was a PLEURAL EFFUSION. L Effusion: The LEFT chest was evaluated and there was a PLEURAL EFFUSION. A-lines: The RIGHT chest was evaluated and multiple A-LINES were visualized B-lines: The RIGHT chest was evaluated and multiple B-LINES were visualized R Consolidation: The RIGHT chest was evaluated and there was a RIGHT CONSOLIDATION and visualized. (right upper) L Consolidation: The LEFT chest was evaluated and there was NO LEFT CONSOLIDATION. Impression: Thorax: The focused thoracic ultrasound exam had ABNORMAL findings as specified.         Assessment/Plan   Principal Problem:    Acute on chronic hypoxic respiratory failure (CMS/HCC)  Active Problems:    Aortic stenosis    BPH (benign prostatic hyperplasia)    COPD, severe (CMS/HCC)    Iron deficiency anemia    Stage 3b chronic kidney disease (CMS/HCC)    Chronic respiratory failure with hypoxia (CMS/HCC)    Essential hypertension    Mitral valve insufficiency    Acute on  chronic congestive heart failure, unspecified heart failure type (CMS/HCC)      Plan:    Admit to inpatient with tele  O2 to maintain SP02 > 92%, wears 2 lpm nasal cannula at baseline currently on 4 lpm nasal cannula  BiPAP HS  Azithromycin and ceftriaxone  Incentive spirometer  Cardiology consult: Acute on chronic heart failure- Dr. Mauro  CXR bilateral pleural effusions left > right  Lasix 20 mg received in ED  Duo nebs  Lasix 20 mg BID starting tomorrow, follow cardiology recommendations  Strict I and O's  Daily weight's  Bedside swallow  Speech consult-rule out aspiration  Cardiac 2 G sodium diet  Seen by Palliative on last admission-will re consult Palliative   Am labs including cbc, bmp, mag  POC discussed with patient and attending  PT OT consulted  Dispo: Likely require > 2 midnight stays to adequately treat    Code status: DNR no intubation (signed copy on the chart)    I spent >55 minutes in the professional and overall care of this patient.    SIGNATURE: ERIC [facility]TANYA PATIENT NAME: José Antonio Sandra   DATE: February 12, 2024 MRN: [id number]   TIME: 8:07 AM    Devorah Bruce CNP  [facility]  [address] [address]  Phone: [phone] Fax: [phone]

## 2024-02-12 NOTE — SIGNIFICANT EVENT
Palliative care consultation/goals of care    Writer met with Kym Narvaez, as well as Alejandra Delgado, as well as patient's son, wife one of his grandchildren at the bedside in the emergency department; ultimately, they would like to pursue comfort measures; after discussing that there was a lot of distress related to having recent admission and discharge to skilled nursing, intolerance of CPAP as well as other proposed treatments, they wish to focus on comfort including continued no resuscitative efforts or advancement in care, no desire for escalation of care including ICU, he would like to remain DO NOT INTUBATE, he would not like to have CPAP or BiPAP, he would continue to like not to be resuscitated under any circumstance; they were amenable to meeting with hospice at this time, hospice social worker will meet with the patient and family later in the afternoon and make plans for transfer to [facility] upon bed availability; end-of-life order set has been employed, nonessential medications have been stopped, the family was updated at the bedside and agrees with the transition in the model of care as outlined above.    Dion Mauro MD  [facility]  Internal Medicine    This document was generated in whole or in part using the Dragon One medical voice recognition software and there may be some incorrect words/wording, spelling, or punctuation errors that were not corrected prior to finalization in the medical record.

## 2024-02-13 LAB
ATRIAL RATE: 87 BPM
ATRIAL RATE: 91 BPM
PR INTERVAL: 190 MS
Q ONSET: 217 MS
Q ONSET: 228 MS
QRS COUNT: 13 BEATS
QRS COUNT: 13 BEATS
QRS DURATION: 72 MS
QRS DURATION: 80 MS
QT INTERVAL: 384 MS
QT INTERVAL: 398 MS
QTC CALCULATION(BAZETT): 434 MS
QTC CALCULATION(BAZETT): 478 MS
QTC FREDERICIA: 417 MS
QTC FREDERICIA: 450 MS
R AXIS: -11 DEGREES
R AXIS: -11 DEGREES
T AXIS: 33 DEGREES
T AXIS: 67 DEGREES
T OFFSET: 416 MS
T OFFSET: 420 MS
VENTRICULAR RATE: 77 BPM
VENTRICULAR RATE: 87 BPM

## 2024-02-16 ENCOUNTER — TELEPHONE (OUTPATIENT)
Dept: PULMONOLOGY | Facility: CLINIC | Age: 89
End: 2024-02-16

## 2024-02-16 ENCOUNTER — APPOINTMENT (OUTPATIENT)
Dept: PODIATRY | Facility: CLINIC | Age: 89
End: 2024-02-16
Payer: MEDICARE

## 2024-02-21 ENCOUNTER — APPOINTMENT (OUTPATIENT)
Dept: CARDIOLOGY | Facility: CLINIC | Age: 89
End: 2024-02-21
Payer: MEDICARE

## 2024-04-16 ENCOUNTER — APPOINTMENT (OUTPATIENT)
Dept: PULMONOLOGY | Facility: CLINIC | Age: 89
End: 2024-04-16
Payer: MEDICARE

## 2024-04-18 ENCOUNTER — APPOINTMENT (OUTPATIENT)
Dept: PRIMARY CARE | Facility: CLINIC | Age: 89
End: 2024-04-18
Payer: MEDICARE

## 2024-04-29 ENCOUNTER — APPOINTMENT (OUTPATIENT)
Dept: ENDOCRINOLOGY | Facility: CLINIC | Age: 89
End: 2024-04-29
Payer: MEDICARE

## 2024-05-04 ENCOUNTER — APPOINTMENT (OUTPATIENT)
Dept: INFUSION THERAPY | Facility: CLINIC | Age: 89
End: 2024-05-04
Payer: MEDICARE

## 2024-10-22 ENCOUNTER — APPOINTMENT (OUTPATIENT)
Dept: PRIMARY CARE | Facility: CLINIC | Age: 89
End: 2024-10-22
Payer: MEDICARE

## 2025-07-15 NOTE — CARE PLAN
Problem: Pain - Adult  Goal: Verbalizes/displays adequate comfort level or baseline comfort level  Outcome: Progressing     Problem: Safety - Adult  Goal: Free from fall injury  Outcome: Progressing     Problem: Chronic Conditions and Co-morbidities  Goal: Patient's chronic conditions and co-morbidity symptoms are monitored and maintained or improved  Outcome: Progressing     Problem: Pain  Goal: Takes deep breaths with improved pain control throughout the shift  Outcome: Progressing  Goal: Turns in bed with improved pain control throughout the shift  Outcome: Progressing  Goal: Walks with improved pain control throughout the shift  Outcome: Progressing  Goal: Performs ADL's with improved pain control throughout shift  Outcome: Progressing  Goal: Participates in PT with improved pain control throughout the shift  Outcome: Progressing  Goal: Free from opioid side effects throughout the shift  Outcome: Progressing  Goal: Free from acute confusion related to pain meds throughout the shift  Outcome: Progressing   The patient's goals for the shift include      The clinical goals for the shift include Patient will be able to have oxygen weaned this shift and maintain spo2 92% or greater    Patient remains on lasix gtt at 5 mg/hr with good urine output. Patient's oxygen weaned to 4l oxymizer.      since some of her symptoms have improved, ok to continue conservative therapy  - OTC cough meds,   - tylenol/ibuprofen for headache/fevers/chills,   - OTC antidiarrheal (e.g., imodium) as needed for diarrhea.  - OTC nasal spray (flonase) for nasal congestion.  - keeping up with fluid intake,    if persistent symptoms without further improvements, then recommend RTC in person for further evaluation.